# Patient Record
Sex: FEMALE | Race: BLACK OR AFRICAN AMERICAN | NOT HISPANIC OR LATINO | Employment: FULL TIME | URBAN - METROPOLITAN AREA
[De-identification: names, ages, dates, MRNs, and addresses within clinical notes are randomized per-mention and may not be internally consistent; named-entity substitution may affect disease eponyms.]

---

## 2018-05-17 PROBLEM — G44.229 CHRONIC TENSION-TYPE HEADACHE, NOT INTRACTABLE: Chronic | Status: ACTIVE | Noted: 2018-05-17

## 2018-05-17 PROBLEM — Z79.01 CHRONIC ANTICOAGULATION: Chronic | Status: ACTIVE | Noted: 2018-05-17

## 2018-05-17 PROBLEM — I82.532 CHRONIC DEEP VEIN THROMBOSIS (DVT) OF POPLITEAL VEIN OF LEFT LOWER EXTREMITY: Chronic | Status: ACTIVE | Noted: 2018-05-17

## 2018-05-17 PROBLEM — I10 ESSENTIAL HYPERTENSION: Chronic | Status: ACTIVE | Noted: 2018-05-17

## 2019-04-04 ENCOUNTER — HOSPITAL ENCOUNTER (OUTPATIENT)
Dept: SLEEP MEDICINE | Facility: HOSPITAL | Age: 49
Discharge: HOME | End: 2019-04-04
Attending: SPECIALIST
Payer: COMMERCIAL

## 2019-04-04 DIAGNOSIS — F51.01 PRIMARY INSOMNIA: ICD-10-CM

## 2019-04-04 PROCEDURE — 95810 POLYSOM 6/> YRS 4/> PARAM: CPT

## 2019-04-16 ENCOUNTER — HOSPITAL ENCOUNTER (OUTPATIENT)
Dept: SLEEP MEDICINE | Facility: HOSPITAL | Age: 49
Discharge: HOME | End: 2019-04-16
Attending: SPECIALIST
Payer: COMMERCIAL

## 2019-04-16 DIAGNOSIS — G47.33 OBSTRUCTIVE SLEEP APNEA (ADULT) (PEDIATRIC): ICD-10-CM

## 2019-04-16 PROCEDURE — 95811 POLYSOM 6/>YRS CPAP 4/> PARM: CPT

## 2021-07-16 PROBLEM — E66.3 OVERWEIGHT WITH BODY MASS INDEX (BMI) OF 29 TO 29.9 IN ADULT: Chronic | Status: ACTIVE | Noted: 2021-07-16

## 2021-11-09 PROBLEM — Z51.81 THERAPEUTIC DRUG MONITORING: Chronic | Status: ACTIVE | Noted: 2021-11-09

## 2021-11-09 PROBLEM — E66.811 CLASS 1 OBESITY WITH ALVEOLAR HYPOVENTILATION, SERIOUS COMORBIDITY, AND BODY MASS INDEX (BMI) OF 30.0 TO 30.9 IN ADULT: Chronic | Status: ACTIVE | Noted: 2021-07-16

## 2021-11-09 PROBLEM — Z71.3 DIETARY COUNSELING: Chronic | Status: ACTIVE | Noted: 2021-11-09

## 2021-11-09 PROBLEM — E66.2 CLASS 1 OBESITY WITH ALVEOLAR HYPOVENTILATION, SERIOUS COMORBIDITY, AND BODY MASS INDEX (BMI) OF 30.0 TO 30.9 IN ADULT: Chronic | Status: ACTIVE | Noted: 2021-07-16

## 2021-11-09 PROBLEM — Z51.81 THERAPEUTIC DRUG MONITORING: Status: ACTIVE | Noted: 2021-11-09

## 2021-11-09 PROBLEM — Z71.3 DIETARY COUNSELING: Status: ACTIVE | Noted: 2021-11-09

## 2022-05-04 PROBLEM — R55 SYNCOPE: Status: ACTIVE | Noted: 2022-05-04

## 2022-05-04 PROBLEM — I16.0 HYPERTENSIVE URGENCY: Status: ACTIVE | Noted: 2022-05-04

## 2022-05-04 PROBLEM — Z86.73 HX OF CEREBRAL INFARCTION: Status: ACTIVE | Noted: 2022-05-04

## 2022-05-06 PROBLEM — Z32.01 POSITIVE PREGNANCY TEST: Status: ACTIVE | Noted: 2022-05-06

## 2022-05-06 PROBLEM — G93.5 CHIARI MALFORMATION TYPE I: Status: ACTIVE | Noted: 2022-05-06

## 2022-06-02 PROBLEM — Z71.89 ENCOUNTER TO DISCUSS TREATMENT OPTIONS: Status: ACTIVE | Noted: 2022-06-02

## 2022-06-02 PROBLEM — R59.1 LYMPHADENOPATHY: Status: ACTIVE | Noted: 2022-06-02

## 2022-06-02 PROBLEM — I63.9 ISCHEMIC STROKE: Status: ACTIVE | Noted: 2022-06-02

## 2022-06-02 PROBLEM — Z71.2 ENCOUNTER TO DISCUSS TEST RESULTS: Status: ACTIVE | Noted: 2022-06-02

## 2022-06-02 PROBLEM — D68.59 HYPERCOAGULABLE STATE: Status: ACTIVE | Noted: 2022-06-02

## 2022-06-17 PROBLEM — Z51.81 THERAPEUTIC DRUG MONITORING: Chronic | Status: RESOLVED | Noted: 2021-11-09 | Resolved: 2022-06-17

## 2022-06-17 PROBLEM — R55 SYNCOPE: Status: RESOLVED | Noted: 2022-05-04 | Resolved: 2022-06-17

## 2022-06-17 PROBLEM — Z71.89 ENCOUNTER TO DISCUSS TREATMENT OPTIONS: Status: RESOLVED | Noted: 2022-06-02 | Resolved: 2022-06-17

## 2022-06-17 PROBLEM — Z71.2 ENCOUNTER TO DISCUSS TEST RESULTS: Status: RESOLVED | Noted: 2022-06-02 | Resolved: 2022-06-17

## 2022-06-17 PROBLEM — Z71.3 DIETARY COUNSELING: Chronic | Status: RESOLVED | Noted: 2021-11-09 | Resolved: 2022-06-17

## 2022-06-17 PROBLEM — I16.0 HYPERTENSIVE URGENCY: Status: RESOLVED | Noted: 2022-05-04 | Resolved: 2022-06-17

## 2022-06-17 PROBLEM — R77.1 ELEVATED SERUM GLOBULIN LEVEL: Status: ACTIVE | Noted: 2022-06-17

## 2022-06-17 PROBLEM — Z32.01 POSITIVE PREGNANCY TEST: Status: RESOLVED | Noted: 2022-05-06 | Resolved: 2022-06-17

## 2023-03-05 PROBLEM — D50.8 IRON DEFICIENCY ANEMIA SECONDARY TO INADEQUATE DIETARY IRON INTAKE: Status: ACTIVE | Noted: 2023-03-05

## 2023-07-11 PROBLEM — Z71.9 ENCOUNTER FOR EDUCATION: Status: ACTIVE | Noted: 2023-07-11

## 2023-07-11 PROBLEM — E04.2 MULTIPLE THYROID NODULES: Status: ACTIVE | Noted: 2023-07-11

## 2023-10-18 PROBLEM — G40.A09 ABSENCE SEIZURE: Status: ACTIVE | Noted: 2023-10-18

## 2023-10-18 PROBLEM — G40.A09 ABSENCE SEIZURE: Chronic | Status: ACTIVE | Noted: 2023-10-18

## 2023-10-18 PROBLEM — D68.59 HYPERCOAGULABLE STATE: Chronic | Status: ACTIVE | Noted: 2022-06-02

## 2023-10-18 PROBLEM — R73.01 IMPAIRED FASTING GLUCOSE: Status: ACTIVE | Noted: 2023-10-18

## 2023-10-18 PROBLEM — G93.5 CHIARI MALFORMATION TYPE I: Chronic | Status: ACTIVE | Noted: 2022-05-06

## 2023-10-18 PROBLEM — I63.9 ISCHEMIC STROKE: Chronic | Status: ACTIVE | Noted: 2022-06-02

## 2023-10-18 PROBLEM — H54.7 VISION LOSS: Chronic | Status: ACTIVE | Noted: 2023-10-18

## 2024-01-22 PROBLEM — I63.9 ISCHEMIC STROKE: Chronic | Status: RESOLVED | Noted: 2022-06-02 | Resolved: 2024-01-22

## 2024-02-19 ENCOUNTER — TELEPHONE (OUTPATIENT)
Dept: NEUROLOGY | Facility: CLINIC | Age: 54
End: 2024-02-19
Payer: COMMERCIAL

## 2024-02-19 NOTE — TELEPHONE ENCOUNTER
Neuro referral, MRI reports and neuro notes in Epic. Msg to Alexia to sched new pt with Dr Mora or Dr Sutherland.

## 2024-02-20 ENCOUNTER — TELEPHONE (OUTPATIENT)
Dept: NEUROLOGY | Facility: CLINIC | Age: 54
End: 2024-02-20
Payer: COMMERCIAL

## 2024-03-07 ENCOUNTER — TELEPHONE (OUTPATIENT)
Dept: NEUROLOGY | Facility: CLINIC | Age: 54
End: 2024-03-07
Payer: COMMERCIAL

## 2024-04-04 ENCOUNTER — LAB VISIT (OUTPATIENT)
Dept: LAB | Facility: HOSPITAL | Age: 54
End: 2024-04-04
Attending: STUDENT IN AN ORGANIZED HEALTH CARE EDUCATION/TRAINING PROGRAM
Payer: COMMERCIAL

## 2024-04-04 ENCOUNTER — DOCUMENTATION ONLY (OUTPATIENT)
Dept: NEUROLOGY | Facility: CLINIC | Age: 54
End: 2024-04-04
Payer: COMMERCIAL

## 2024-04-04 ENCOUNTER — OFFICE VISIT (OUTPATIENT)
Dept: NEUROLOGY | Facility: CLINIC | Age: 54
End: 2024-04-04
Payer: COMMERCIAL

## 2024-04-04 VITALS
BODY MASS INDEX: 29.48 KG/M2 | DIASTOLIC BLOOD PRESSURE: 86 MMHG | WEIGHT: 187.81 LBS | HEIGHT: 67 IN | SYSTOLIC BLOOD PRESSURE: 126 MMHG | HEART RATE: 80 BPM

## 2024-04-04 DIAGNOSIS — H54.7 VISION LOSS: Chronic | ICD-10-CM

## 2024-04-04 DIAGNOSIS — H54.7 VISION LOSS: Primary | Chronic | ICD-10-CM

## 2024-04-04 DIAGNOSIS — G35 MULTIPLE SCLEROSIS: ICD-10-CM

## 2024-04-04 DIAGNOSIS — I63.89 OTHER CEREBRAL INFARCTION: ICD-10-CM

## 2024-04-04 DIAGNOSIS — R42 DIZZINESS: ICD-10-CM

## 2024-04-04 LAB
C3 SERPL-MCNC: 149 MG/DL (ref 50–180)
C4 SERPL-MCNC: 46 MG/DL (ref 11–44)
ERYTHROCYTE [SEDIMENTATION RATE] IN BLOOD BY PHOTOMETRIC METHOD: 32 MM/HR (ref 0–36)

## 2024-04-04 PROCEDURE — 86160 COMPLEMENT ANTIGEN: CPT | Performed by: STUDENT IN AN ORGANIZED HEALTH CARE EDUCATION/TRAINING PROGRAM

## 2024-04-04 PROCEDURE — 82595 ASSAY OF CRYOGLOBULIN: CPT | Performed by: STUDENT IN AN ORGANIZED HEALTH CARE EDUCATION/TRAINING PROGRAM

## 2024-04-04 PROCEDURE — 99999 PR PBB SHADOW E&M-EST. PATIENT-LVL V: CPT | Mod: PBBFAC,,, | Performed by: STUDENT IN AN ORGANIZED HEALTH CARE EDUCATION/TRAINING PROGRAM

## 2024-04-04 PROCEDURE — 86363 MOG-IGG1 ANTB FLO CYTMTRY EA: CPT | Performed by: STUDENT IN AN ORGANIZED HEALTH CARE EDUCATION/TRAINING PROGRAM

## 2024-04-04 PROCEDURE — G2211 COMPLEX E/M VISIT ADD ON: HCPCS | Mod: S$GLB,,, | Performed by: STUDENT IN AN ORGANIZED HEALTH CARE EDUCATION/TRAINING PROGRAM

## 2024-04-04 PROCEDURE — 86036 ANCA SCREEN EACH ANTIBODY: CPT | Performed by: STUDENT IN AN ORGANIZED HEALTH CARE EDUCATION/TRAINING PROGRAM

## 2024-04-04 PROCEDURE — 86053 AQAPRN-4 ANTB FLO CYTMTRY EA: CPT | Performed by: STUDENT IN AN ORGANIZED HEALTH CARE EDUCATION/TRAINING PROGRAM

## 2024-04-04 PROCEDURE — 85652 RBC SED RATE AUTOMATED: CPT | Performed by: STUDENT IN AN ORGANIZED HEALTH CARE EDUCATION/TRAINING PROGRAM

## 2024-04-04 PROCEDURE — 86235 NUCLEAR ANTIGEN ANTIBODY: CPT | Mod: 59 | Performed by: STUDENT IN AN ORGANIZED HEALTH CARE EDUCATION/TRAINING PROGRAM

## 2024-04-04 PROCEDURE — 4010F ACE/ARB THERAPY RXD/TAKEN: CPT | Mod: CPTII,S$GLB,, | Performed by: STUDENT IN AN ORGANIZED HEALTH CARE EDUCATION/TRAINING PROGRAM

## 2024-04-04 PROCEDURE — 0361U NEURFLMNT LT CHN DIG IA QUAN: CPT | Performed by: STUDENT IN AN ORGANIZED HEALTH CARE EDUCATION/TRAINING PROGRAM

## 2024-04-04 PROCEDURE — 3079F DIAST BP 80-89 MM HG: CPT | Mod: CPTII,S$GLB,, | Performed by: STUDENT IN AN ORGANIZED HEALTH CARE EDUCATION/TRAINING PROGRAM

## 2024-04-04 PROCEDURE — 3074F SYST BP LT 130 MM HG: CPT | Mod: CPTII,S$GLB,, | Performed by: STUDENT IN AN ORGANIZED HEALTH CARE EDUCATION/TRAINING PROGRAM

## 2024-04-04 PROCEDURE — 3008F BODY MASS INDEX DOCD: CPT | Mod: CPTII,S$GLB,, | Performed by: STUDENT IN AN ORGANIZED HEALTH CARE EDUCATION/TRAINING PROGRAM

## 2024-04-04 PROCEDURE — 36415 COLL VENOUS BLD VENIPUNCTURE: CPT | Performed by: STUDENT IN AN ORGANIZED HEALTH CARE EDUCATION/TRAINING PROGRAM

## 2024-04-04 PROCEDURE — 86235 NUCLEAR ANTIGEN ANTIBODY: CPT | Performed by: STUDENT IN AN ORGANIZED HEALTH CARE EDUCATION/TRAINING PROGRAM

## 2024-04-04 PROCEDURE — 1159F MED LIST DOCD IN RCRD: CPT | Mod: CPTII,S$GLB,, | Performed by: STUDENT IN AN ORGANIZED HEALTH CARE EDUCATION/TRAINING PROGRAM

## 2024-04-04 PROCEDURE — 86160 COMPLEMENT ANTIGEN: CPT | Mod: 59 | Performed by: STUDENT IN AN ORGANIZED HEALTH CARE EDUCATION/TRAINING PROGRAM

## 2024-04-04 PROCEDURE — 99215 OFFICE O/P EST HI 40 MIN: CPT | Mod: S$GLB,,, | Performed by: STUDENT IN AN ORGANIZED HEALTH CARE EDUCATION/TRAINING PROGRAM

## 2024-04-04 NOTE — PROGRESS NOTES
[facility]  New Patient Visit      History:     This is a patient referred here for MS evaluation.  She was last seen by Dr. Hendricks in 02/15/2024.    In May 2023 she developed sudden onset of dizziness and presyncope and was diagnosed with acute ischemic stroke in the ER.  At that time she was already on Coumadin due to history of DVT and PE requiring thrombectomy.  MRI at that time also revealed a Chiari 1 malformation and a cervical syrinx.  Her coumadin dose was increased and she was discharged home after a relatively benign cardiac workup.  SHe was evaluated by hematology and janneth diagnosed with idiopathic hypercoagulable state, plan to stay on ASA 81, coumadin for life.     In Aug 2023 She had another episode of dizziness and syncope.     She was started on Keppra after 5/2023 for staring spells thought to be due to seizures.. Last EEG normal and she was taken off of Keppra by Dr. Hendricks in Feb 2024     In October 2023 series started developing blurry vision bilaterally that has been worsened over time. She was evaluated by ophthalmology and no issues were found.     She did complete trial of physical therapy for dizziness and it helped her dizziness some.   MRI stable but there was a question about demyelinating disease on most recent MRI.     Currently she feels imbalanced and worsening blurriness on right.     She has been on medical leave due to imbalance since last month due to her symptoms. Used to work as teacher.       ROS:     A complete 9 system ROS was reviewed by me and otherwise negative .     Past Medical History:   Diagnosis Date    Anemia     Anticoagulated on Coumadin     DVT (deep venous thrombosis)     Headache     Hypertension        Past Surgical History:   Procedure Laterality Date    PULMONARY EMBOLISM SURGERY      TUBAL LIGATION         Family History   Problem Relation Age of Onset    Arthritis Mother     Cancer Mother     Depression Mother     Early death Mother      Heart disease Mother     Hypertension Mother     Mental illness Mother     Miscarriages / Stillbirths Mother        Social History     Socioeconomic History    Marital status:    Tobacco Use    Smoking status: Never     Passive exposure: Never    Smokeless tobacco: Never   Substance and Sexual Activity    Alcohol use: No    Drug use: No    Sexual activity: Yes     Partners: Male     Social Determinants of Health     Financial Resource Strain: Medium Risk (4/4/2024)    Overall Financial Resource Strain (CARDIA)     Difficulty of Paying Living Expenses: Somewhat hard   Food Insecurity: Food Insecurity Present (4/4/2024)    Hunger Vital Sign     Worried About Running Out of Food in the Last Year: Sometimes true     Ran Out of Food in the Last Year: Never true   Transportation Needs: No Transportation Needs (4/4/2024)    PRAPARE - Transportation     Lack of Transportation (Medical): No     Lack of Transportation (Non-Medical): No   Physical Activity: Unknown (4/4/2024)    Exercise Vital Sign     Minutes of Exercise per Session: 20 min   Stress: Stress Concern Present (4/4/2024)    [employer] [employer] - Occupational Stress Questionnaire     Feeling of Stress : To some extent   Social Connections: Unknown (4/4/2024)    Social Connection and Isolation Panel [NHANES]     Frequency of Communication with Friends and Family: More than three times a week     Frequency of Social Gatherings with Friends and Family: Once a week     Active Member of Clubs or Organizations: No     Attends Club or Organization Meetings: Never     Marital Status:    Housing Stability: Low Risk  (4/4/2024)    Housing Stability Vital Sign     Unable to Pay for Housing in the Last Year: No     Number of Places Lived in the Last Year: 1     Unstable Housing in the Last Year: No       Review of patient's allergies indicates:   Allergen Reactions    Pcn [penicillins] Other (See Comments)     unknown       Living  "arrangements - the patient lives with their family.    Patient Employment       Status   Disabled    Employer   [employer] (OTHER)    Address   ,                Exam:     Vitals:    04/04/24 1218   BP: 126/86   Pulse: 80   Weight: 85.2 kg (187 lb 13.3 oz)   Height: 5' 7" (1.702 m)          In general, the patient is well nourished and appears to be in no acute distress.    MENTAL STATUS: language is fluent, normal verbal comprehension, short-term and remote memory is intact, attention is normal, patient is alert and oriented, fund of knowlege is appropriate by vocabulary. Behavior and judgment appropriate with full medical decision making capacity.     CRANIAL NERVE EXAM: Fasting beating nystagmus on R gaze bilaterally.  Extraocular muscles are intact. Pupils are equal, round, and reactive to light. VFs intact. No facial asymmetry. Facial sensation is intact bilaterally. There is no dysarthria. Uvula is midline, and palate moves symmetrically. Shoulder shrug intact bilaterlly. Tongue protrusion is midline. Hearing is intact to finger rub bilaterally. Neck is supple with full ROM  No Lhermitte's    MOTOR EXAM: Normal bulk and tone throughout UE and LE bilaterally.   No pronator drift; rapid sequential movements are normal; Strength is  5/5 in all groups in the lower extremities and upper extremities    REFLEXES: 2+ and symmetric throughout in all four extremeties; toes are down bilaterally; negative Barksdale's, no cross-adductors    SENSORY EXAM: Normal to light touch, vibration, pinprick throughout.    COORDINATION: Normal finger-to-nose exam. Normal heel-to-shin exam.    GAIT: Slow and unsteady. Unable to perform stressed gait and tandem gait due to dizziness.    Positive Romberg.    Imaging (personally reviewed):         Results for orders placed during the hospital encounter of 02/12/24    MRI Brain Demyelinating W W/O Contrast    Impression  1. No acute findings or significant change since 07/27/2023  2. Several " "nonenhancing white matter lesions, appearance suggesting possible combination of demyelination and old lacunar infarcts  3. 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  Cervical cord syrinx is not as well delineated as on prior exams      Electronically signed by: Farida Almaguer MD  Date:    02/12/2024  Time:    17:11    No results found for this or any previous visit.    No results found for this or any previous visit.      Labs:     No results found for: "TXHWLZYP73CQ"  No results found for: "JCVINDEX", "JCVANTIBODY"  No results found for: "BM6WADNC", "ABSOLUTECD3", "AL4JGPBT", "ABSOLUTECD8", "XQ3DZMGA", "ABSOLUTECD4", "LABCD48"  Lab Results   Component Value Date    WBC 4.70 09/19/2023    RBC 4.91 09/19/2023    HGB 13.4 09/19/2023    HCT 40.8 09/19/2023    MCV 83 09/19/2023    MCH 27.4 09/19/2023    MCHC 32.9 09/19/2023    RDW 16.4 (H) 09/19/2023     09/19/2023    MPV 7.3 (L) 09/19/2023    GRAN 2.5 09/19/2023    GRAN 54.7 09/19/2023    LYMPH 1.7 09/19/2023    LYMPH 35.7 09/19/2023    MONO 0.4 09/19/2023    MONO 7.8 09/19/2023    EOS 0.0 09/19/2023    BASO 0.00 09/19/2023    EOSINOPHIL 1.0 09/19/2023    BASOPHIL 0.8 09/19/2023     Sodium   Date Value Ref Range Status   09/19/2023 143 136 - 145 mmol/L Final     Potassium   Date Value Ref Range Status   09/19/2023 3.7 3.5 - 5.1 mmol/L Final     Chloride   Date Value Ref Range Status   09/19/2023 104 95 - 110 mmol/L Final     CO2   Date Value Ref Range Status   09/19/2023 31 (H) 23 - 29 mmol/L Final     Glucose   Date Value Ref Range Status   09/19/2023 114 (H) 74 - 106 mg/dL Final     BUN   Date Value Ref Range Status   09/19/2023 14 7 - 17 mg/dL Final     Creatinine   Date Value Ref Range Status   09/19/2023 0.78 0.70 - 1.20 mg/dL Final     Calcium   Date Value Ref Range Status   09/19/2023 9.2 8.4 - 10.2 mg/dL Final     Total Protein   Date Value Ref Range Status   09/19/2023 8.4 (H) 6.3 - 8.2 g/dL Final     Albumin   Date Value Ref Range Status "   09/19/2023 4.1 3.5 - 5.2 g/dL Final     Total Bilirubin   Date Value Ref Range Status   09/19/2023 0.5 0.2 - 1.3 mg/dL Final     Alkaline Phosphatase   Date Value Ref Range Status   09/19/2023 83 38 - 145 U/L Final     AST   Date Value Ref Range Status   09/19/2023 29 14 - 36 U/L Final     ALT   Date Value Ref Range Status   09/19/2023 19 10 - 44 U/L Final     Anion Gap   Date Value Ref Range Status   09/19/2023 8 8 - 16 mmol/L Final     eGFR if    Date Value Ref Range Status   07/11/2022 >60 >60 mL/min/1.73 m^2 Final     eGFR if non    Date Value Ref Range Status   07/11/2022 >60 >60 mL/min/1.73 m^2 Final     Comment:     Calculation used to obtain the estimated glomerular filtration  rate (eGFR) is the CKD-EPI equation.                   Diagnosis/Assessment/Plan:     Assessment:  MRI reviewed.  She does have several corpus callosum lesions that look suspicious for demyelination.  Her clinical presentation is rather atypical without a clear relapsing history. Exam concerning for BPPV.   The MRI brain lT2 lesions also had diffusion restriction that may be T2 shine through instead of ischemia.  Pending review of her May 2023 scan to verify whether she's had ischemic stroke vs demyelinating lesions. Updated scan with central vein protocol to clarify. Also obtain MRI spine for screening, and MRA for vasculitis workup.   Lab workup for mimics  Referral to vestibular therapy  Neuro-opthalmology evaluation for vision blurriness  Plan discussed and questions were answered to satisfaction.  Return to clinic after imaging and lab results.                     Total time spent with the patient: 60 minutes, including face to face consultation, chart review and coordination of care, on the day of the visit. This includes face to face time and non-face to face time preparing to see the patient (eg, review of tests), obtaining and/or reviewing separately obtained history, documenting clinical  information in the electronic or other health record, independently interpreting resultsand communicating results to the patient/family/caregiver, or care coordination.   I performed a neurobehavioral status examination that included a clinical assessment of thinking, reasoning, and judgment. Please see above HPI and ROS for full details.       Lisa Sutherland MD, MSc  Attending neurologist

## 2024-04-08 LAB
ANCA AB TITR SER IF: NORMAL TITER
ANTI SM ANTIBODY: 0.09 RATIO (ref 0–0.99)
ANTI-SM INTERPRETATION: NEGATIVE
ANTI-SSB ANTIBODY: 0.06 RATIO (ref 0–0.99)
ANTI-SSB INTERPRETATION: NEGATIVE
NEUROFILAMENT LIGHT CHAIN, PLASMA: 16.5 PG/ML
P-ANCA TITR SER IF: NORMAL TITER

## 2024-04-10 LAB
CNS DEMYELINATING DISEASE EVAL: NORMAL
MOG-IGG1: NEGATIVE
NMO/AQP4 FACS,S: NEGATIVE

## 2024-04-15 LAB — CRYOGLOB SER QL: NORMAL

## 2024-04-16 ENCOUNTER — TELEPHONE (OUTPATIENT)
Dept: OPHTHALMOLOGY | Facility: CLINIC | Age: 54
End: 2024-04-16
Payer: COMMERCIAL

## 2024-04-16 NOTE — TELEPHONE ENCOUNTER
----- Message from Shayy Cloud MA sent at 4/5/2024 10:26 AM CDT -----  Pt referred to dr hernandez

## 2024-04-17 PROBLEM — R42 DIZZINESS: Status: ACTIVE | Noted: 2024-04-17

## 2024-04-17 PROBLEM — I63.89 OTHER CEREBRAL INFARCTION: Status: ACTIVE | Noted: 2024-04-17

## 2024-05-03 ENCOUNTER — HOSPITAL ENCOUNTER (OUTPATIENT)
Dept: RADIOLOGY | Facility: HOSPITAL | Age: 54
Discharge: HOME | End: 2024-05-03
Attending: STUDENT IN AN ORGANIZED HEALTH CARE EDUCATION/TRAINING PROGRAM
Payer: COMMERCIAL

## 2024-05-03 DIAGNOSIS — I63.89 OTHER CEREBRAL INFARCTION: ICD-10-CM

## 2024-05-03 DIAGNOSIS — H54.7 VISION LOSS: Chronic | ICD-10-CM

## 2024-05-03 DIAGNOSIS — G96.00 CSF LEAK: Primary | ICD-10-CM

## 2024-05-03 PROCEDURE — 76377 3D RENDER W/INTRP POSTPROCES: CPT | Mod: TC

## 2024-05-03 PROCEDURE — 25500020 PHARM REV CODE 255: Performed by: STUDENT IN AN ORGANIZED HEALTH CARE EDUCATION/TRAINING PROGRAM

## 2024-05-03 PROCEDURE — 76377 3D RENDER W/INTRP POSTPROCES: CPT | Mod: 26,,, | Performed by: RADIOLOGY

## 2024-05-03 PROCEDURE — 70553 MRI BRAIN STEM W/O & W/DYE: CPT | Mod: 26,,, | Performed by: RADIOLOGY

## 2024-05-03 PROCEDURE — A9585 GADOBUTROL INJECTION: HCPCS | Performed by: STUDENT IN AN ORGANIZED HEALTH CARE EDUCATION/TRAINING PROGRAM

## 2024-05-03 RX ORDER — GADOBUTROL 604.72 MG/ML
9 INJECTION INTRAVENOUS
Status: COMPLETED | OUTPATIENT
Start: 2024-05-03 | End: 2024-05-03

## 2024-05-03 RX ADMIN — GADOBUTROL 9 ML: 604.72 INJECTION INTRAVENOUS at 09:05

## 2024-05-08 ENCOUNTER — PATIENT MESSAGE (OUTPATIENT)
Dept: NEUROLOGY | Facility: CLINIC | Age: 54
End: 2024-05-08
Payer: COMMERCIAL

## 2024-05-09 ENCOUNTER — OFFICE VISIT (OUTPATIENT)
Dept: NEUROLOGY | Facility: CLINIC | Age: 54
End: 2024-05-09
Payer: COMMERCIAL

## 2024-05-09 VITALS
HEART RATE: 86 BPM | BODY MASS INDEX: 29.5 KG/M2 | WEIGHT: 187.94 LBS | SYSTOLIC BLOOD PRESSURE: 135 MMHG | HEIGHT: 67 IN | DIASTOLIC BLOOD PRESSURE: 87 MMHG

## 2024-05-09 DIAGNOSIS — R93.0 RADIOLOGICALLY ISOLATED SYNDROME: ICD-10-CM

## 2024-05-09 DIAGNOSIS — G93.5 CHIARI MALFORMATION TYPE I: Primary | Chronic | ICD-10-CM

## 2024-05-09 PROCEDURE — 1159F MED LIST DOCD IN RCRD: CPT | Mod: CPTII,S$GLB,, | Performed by: STUDENT IN AN ORGANIZED HEALTH CARE EDUCATION/TRAINING PROGRAM

## 2024-05-09 PROCEDURE — 3008F BODY MASS INDEX DOCD: CPT | Mod: CPTII,S$GLB,, | Performed by: STUDENT IN AN ORGANIZED HEALTH CARE EDUCATION/TRAINING PROGRAM

## 2024-05-09 PROCEDURE — 99999 PR PBB SHADOW E&M-EST. PATIENT-LVL IV: CPT | Mod: PBBFAC,,, | Performed by: STUDENT IN AN ORGANIZED HEALTH CARE EDUCATION/TRAINING PROGRAM

## 2024-05-09 PROCEDURE — 3079F DIAST BP 80-89 MM HG: CPT | Mod: CPTII,S$GLB,, | Performed by: STUDENT IN AN ORGANIZED HEALTH CARE EDUCATION/TRAINING PROGRAM

## 2024-05-09 PROCEDURE — 3075F SYST BP GE 130 - 139MM HG: CPT | Mod: CPTII,S$GLB,, | Performed by: STUDENT IN AN ORGANIZED HEALTH CARE EDUCATION/TRAINING PROGRAM

## 2024-05-09 PROCEDURE — 4010F ACE/ARB THERAPY RXD/TAKEN: CPT | Mod: CPTII,S$GLB,, | Performed by: STUDENT IN AN ORGANIZED HEALTH CARE EDUCATION/TRAINING PROGRAM

## 2024-05-09 PROCEDURE — 99215 OFFICE O/P EST HI 40 MIN: CPT | Mod: S$GLB,,, | Performed by: STUDENT IN AN ORGANIZED HEALTH CARE EDUCATION/TRAINING PROGRAM

## 2024-05-09 NOTE — PROGRESS NOTES
[facility]  Follow Up Patient Visit      Disease Summary     Principle neurological diagnosis: RIS    Date of symptom onset: NA  Date of diagnosis: 5/9/24  Disease type at diagnosis: RIS  Disease type currently: RIS  Previous therapy: NA  Current therapy: NA  Last MRI Brain: 5/3/24  Last MRI C-spine: 4/22/24 no lesions, but syrinx present  Last MRI T-spine:  4/22/24 no lesions  CSF: NA    Interval history:     Patient here to discuss results.     She is still experiencing dizziness and balance issues. Working with PT currently.    She also has headaches upon standing, improves when lying down. Per patient and , this has been ongoing for years.       ROS:     SOCIAL HISTORY  Living arrangements - the patient lives with their family.  Social History     Socioeconomic History    Marital status:    Tobacco Use    Smoking status: Never     Passive exposure: Never    Smokeless tobacco: Never   Substance and Sexual Activity    Alcohol use: No    Drug use: No    Sexual activity: Yes     Partners: Male     Social Determinants of Health     Financial Resource Strain: Medium Risk (4/4/2024)    Overall Financial Resource Strain (CARDIA)     Difficulty of Paying Living Expenses: Somewhat hard   Food Insecurity: Food Insecurity Present (4/4/2024)    Hunger Vital Sign     Worried About Running Out of Food in the Last Year: Sometimes true     Ran Out of Food in the Last Year: Never true   Transportation Needs: No Transportation Needs (4/4/2024)    PRAPARE - Transportation     Lack of Transportation (Medical): No     Lack of Transportation (Non-Medical): No   Physical Activity: Unknown (4/4/2024)    Exercise Vital Sign     Minutes of Exercise per Session: 20 min   Stress: Stress Concern Present (4/4/2024)    [employer] [place] [employer] - Occupational Stress Questionnaire     Feeling of Stress : To some extent   Housing Stability: Low Risk  (4/4/2024)    Housing  "Stability Vital Sign     Unable to Pay for Housing in the Last Year: No     Number of Places Lived in the Last Year: 1     Unstable Housing in the Last Year: No       Patient Employment       Status   Disabled    Employer   [employer] (OTHER)    Address   ,                  Exam:     Vitals:    05/09/24 1250   BP: 135/87   Pulse: 86   Weight: 85.2 kg (187 lb 15.1 oz)   Height: 5' 7" (1.702 m)          In general, the patient is well nourished and appears to be in no acute distress.    MENTAL STATUS: language is fluent, normal verbal comprehension, short-term and remote memory is intact, attention is normal, patient is alert  CRANIAL NERVE EXAM:  Extraocular muscles are intact.  No facial asymmetry. FThere is no dysarthria.       Imaging (personally reviewed):             Results for orders placed during the hospital encounter of 02/12/24    MRI Brain Demyelinating W W/O Contrast    Impression  1. No acute findings or significant change since 07/27/2023  2. Several nonenhancing white matter lesions, appearance suggesting possible combination of demyelination and old lacunar infarcts  3. 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  Cervical cord syrinx is not as well delineated as on prior exams      Electronically signed by: Farida Almaguer MD  Date:    02/12/2024  Time:    17:11    Results for orders placed during the hospital encounter of 04/22/24    MRI Cervical Spine Demyelinating W W/O Contrast    Impression  1. There is a cystic area 6 x 2 mm posterior to C3 and a area of increased T2 signal in the cervical cord from C 3 through C5 with no enhancement suggesting probable syrinx (syringomyelia)  2. C5-6 focal right paracentral disc extrusion or bony proliferation compressing the cord on the right with mild spinal stenosis moderate right lateral recess and mild neural foramina narrowing  3. At C6-7 focal central protrusion extrusion centrally effacing the cord contributing to mild spinal stenosis  4. Chiari 1 " "malformation      Electronically signed by: Kaylan Wilhelm MD  Date:    04/22/2024  Time:    11:40    Results for orders placed during the hospital encounter of 04/22/24    MRI Thoracic Spine Demyelinating W W/O Contrast    Impression  1. On the  film there is abnormal linear signal throughout the cervical spine refer to MRI of the cervical spine  2. Disc desiccation throughout and small right paracentral protrusion or bulge at T7-8 and no compression of the cord or spinal stenosis      Electronically signed by: Kaylan Wilhelm MD  Date:    04/22/2024  Time:    11:22      Labs:     No results found for: "PDXUXQTG37HO"  No results found for: "JCVINDEX", "JCVANTIBODY"  No results found for: "YZ8GSCRQ", "ABSOLUTECD3", "AV6XLCYX", "ABSOLUTECD8", "AN4ZVEQU", "ABSOLUTECD4", "LABCD48"  Lab Results   Component Value Date    WBC 4.70 09/19/2023    RBC 4.91 09/19/2023    HGB 13.4 09/19/2023    HCT 40.8 09/19/2023    MCV 83 09/19/2023    MCH 27.4 09/19/2023    MCHC 32.9 09/19/2023    RDW 16.4 (H) 09/19/2023     09/19/2023    MPV 7.3 (L) 09/19/2023    GRAN 2.5 09/19/2023    GRAN 54.7 09/19/2023    LYMPH 1.7 09/19/2023    LYMPH 35.7 09/19/2023    MONO 0.4 09/19/2023    MONO 7.8 09/19/2023    EOS 0.0 09/19/2023    BASO 0.00 09/19/2023    EOSINOPHIL 1.0 09/19/2023    BASOPHIL 0.8 09/19/2023     Sodium   Date Value Ref Range Status   09/19/2023 143 136 - 145 mmol/L Final     Potassium   Date Value Ref Range Status   09/19/2023 3.7 3.5 - 5.1 mmol/L Final     Chloride   Date Value Ref Range Status   09/19/2023 104 95 - 110 mmol/L Final     CO2   Date Value Ref Range Status   09/19/2023 31 (H) 23 - 29 mmol/L Final     Glucose   Date Value Ref Range Status   09/19/2023 114 (H) 74 - 106 mg/dL Final     BUN   Date Value Ref Range Status   09/19/2023 14 7 - 17 mg/dL Final     Creatinine   Date Value Ref Range Status   09/19/2023 0.78 0.70 - 1.20 mg/dL Final     Calcium   Date Value Ref Range Status   09/19/2023 9.2 8.4 - 10.2 mg/dL " Final     Total Protein   Date Value Ref Range Status   09/19/2023 8.4 (H) 6.3 - 8.2 g/dL Final     Albumin   Date Value Ref Range Status   09/19/2023 4.1 3.5 - 5.2 g/dL Final     Total Bilirubin   Date Value Ref Range Status   09/19/2023 0.5 0.2 - 1.3 mg/dL Final     Alkaline Phosphatase   Date Value Ref Range Status   09/19/2023 83 38 - 145 U/L Final     AST   Date Value Ref Range Status   09/19/2023 29 14 - 36 U/L Final     ALT   Date Value Ref Range Status   09/19/2023 19 10 - 44 U/L Final     Anion Gap   Date Value Ref Range Status   09/19/2023 8 8 - 16 mmol/L Final     eGFR if    Date Value Ref Range Status   07/11/2022 >60 >60 mL/min/1.73 m^2 Final     eGFR if non    Date Value Ref Range Status   07/11/2022 >60 >60 mL/min/1.73 m^2 Final     Comment:     Calculation used to obtain the estimated glomerular filtration  rate (eGFR) is the CKD-EPI equation.          Diagnosis/Assessment/Plan:     Radiologically isolated syndrome  -Assessment:No classic MS relapse symptoms. Her current symptoms of dizziness and imbalance seem to be associated with her Chiari malformation and possible intracranial hypotension.   Discussed with neuroradiology Dr. Patricia, engorgement of the dural venous sinuses, pituitary gland, osseous thickening of calvarial margin suggest chronic intracrnial hypotension. Central vein sign is positive in many of the lesions consistent with demyelinatoin, suggesting possible RIS given absence of classic MS symptoms clinically. Depending on lesion evolution, she may benefit from low efficacy DMT that has better safety profile.  Would try to obtain CSF next depending on CSF leak workup via interventional radiology. Will also refer to neurosurgery    Symptom management   -Discussed increased hydration  Plan discussed and questions were answered to satisfaction.  Return to clinic in 3-4 weeks.        NEURO MULTIPLE SCLEROSIS IMPRESSION:   Number of relapses in the past  year?:  0  Clinical Progression:  Clinically Stable  MRI Progression:  Stable  MS Classification:  Radiologically isolated syndrome  DMT:  No change in management  Symptom Management:  No change in symptom management        Total time spent with the patient: 40 minutes, including face to face consultation, chart review and coordination of care, on the day of the visit. This includes face to face time and non-face to face time preparing to see the patient (eg, review of tests), obtaining and/or reviewing separately obtained history, documenting clinical information in the electronic or other health record, independently interpreting results and communicating results to the patient/family/caregiver, or care coordination.   I performed a neurobehavioral status examination that included a clinical assessment of thinking, reasoning, and judgment. Please see above HPI and ROS for full details.       Lisa Sutherland MD, MSc  Attending neurologist

## 2024-05-15 ENCOUNTER — OFFICE VISIT (OUTPATIENT)
Dept: NEUROSURGERY | Facility: CLINIC | Age: 54
End: 2024-05-15
Payer: COMMERCIAL

## 2024-05-15 VITALS — SYSTOLIC BLOOD PRESSURE: 116 MMHG | DIASTOLIC BLOOD PRESSURE: 79 MMHG | HEART RATE: 90 BPM

## 2024-05-15 DIAGNOSIS — G95.0 SYRINX OF SPINAL CORD: Primary | ICD-10-CM

## 2024-05-15 DIAGNOSIS — G93.5 CHIARI MALFORMATION TYPE I: Chronic | ICD-10-CM

## 2024-05-15 PROCEDURE — 3078F DIAST BP <80 MM HG: CPT | Mod: CPTII,S$GLB,, | Performed by: NEUROLOGICAL SURGERY

## 2024-05-15 PROCEDURE — 1159F MED LIST DOCD IN RCRD: CPT | Mod: CPTII,S$GLB,, | Performed by: NEUROLOGICAL SURGERY

## 2024-05-15 PROCEDURE — 99999 PR PBB SHADOW E&M-EST. PATIENT-LVL IV: CPT | Mod: PBBFAC,,, | Performed by: NEUROLOGICAL SURGERY

## 2024-05-15 PROCEDURE — 4010F ACE/ARB THERAPY RXD/TAKEN: CPT | Mod: CPTII,S$GLB,, | Performed by: NEUROLOGICAL SURGERY

## 2024-05-15 PROCEDURE — 3074F SYST BP LT 130 MM HG: CPT | Mod: CPTII,S$GLB,, | Performed by: NEUROLOGICAL SURGERY

## 2024-05-15 PROCEDURE — 99205 OFFICE O/P NEW HI 60 MIN: CPT | Mod: S$GLB,,, | Performed by: NEUROLOGICAL SURGERY

## 2024-05-15 PROCEDURE — 1160F RVW MEDS BY RX/DR IN RCRD: CPT | Mod: CPTII,S$GLB,, | Performed by: NEUROLOGICAL SURGERY

## 2024-05-15 NOTE — PROGRESS NOTES
CHIEF COMPLAINT:  Chiari malformation    HPI:  Elva Burroughs is a 53 y.o.  female with below PMH including RIS and prior strokes/TIA for which she is on warfarin and ASA, who is referred to me by Dr Sutherland for evaluation of chiari malformation.  She reports headaches that are frontal and occipital in location.  Her headaches used to be daily but now occur from every other week to 1-2 times per week.  She finds that Tylenol and aspirin have been helpful.  She reports that coughing and bending over intensify her headaches.  She does not think that her headaches are positional however she finds that resting or lying down will help her headaches.    She reports imbalance and gait disturbance.    She also reports dizziness and blurry vision.  She has been evaluated by Ophthalmology who reports that she has 2020 vision and does not have any papilledema.  The source of her blurry vision remains unclear.      Whenever her recent MRIs demonstrated features of possible intracranial hypotension however she has no confirmed evidence of a CSF leak or any spinal trauma from which a leak could have resulted.  She did faint when she had her stroke but never has injured her spine.    Review of patient's allergies indicates:   Allergen Reactions    Pcn [penicillins] Other (See Comments)     unknown       Past Medical History:   Diagnosis Date    Anemia     Anticoagulated on Coumadin     DVT (deep venous thrombosis)     Headache     Hypertension      Past Surgical History:   Procedure Laterality Date    PULMONARY EMBOLISM SURGERY      TUBAL LIGATION       Family History   Problem Relation Name Age of Onset    Arthritis Mother      Cancer Mother      Depression Mother      Early death Mother      Heart disease Mother      Hypertension Mother      Mental illness Mother      Miscarriages / Stillbirths Mother       Social History     Tobacco Use    Smoking status: Never     Passive exposure: Never    Smokeless tobacco: Never    Substance Use Topics    Alcohol use: No    Drug use: No        Review of Systems   Constitutional: Negative.    HENT:  Negative for congestion, ear discharge, ear pain, hearing loss, nosebleeds, sinus pain and tinnitus.    Eyes:  Positive for blurred vision.   Respiratory: Negative.     Cardiovascular:  Negative for chest pain, palpitations, claudication and leg swelling.   Gastrointestinal:  Negative for abdominal pain, blood in stool, constipation, diarrhea, melena and vomiting.   Genitourinary:  Negative for flank pain, frequency and urgency.   Musculoskeletal:  Negative for falls.   Skin: Negative.    Neurological:  Positive for dizziness and headaches. Negative for tingling, tremors, sensory change, speech change, focal weakness, seizures, loss of consciousness and weakness.   Endo/Heme/Allergies:  Does not bruise/bleed easily.   Psychiatric/Behavioral: Negative.         OBJECTIVE:   Vital Signs:  Pulse: 90 (05/15/24 1314)  BP: 116/79 (05/15/24 1314)    Physical Exam:  Constitutional: Patient sitting comfortably in chair. Appears well developed and well nourished.  Skin: Exposed areas are intact without abnormal markings, rashes or other lesions.  HEENT: Normocephalic. Normal conjunctivae.  Cardiovascular: Normal rate and regular rhythm.  Respiratory: Chest wall rises and falls symmetrically, without signs of respiratory distress.  Abdomen: Soft and non-tender.  Extremities: Warm and without edema. Calves supple, non-tender.  Psych/Behavior: Normal affect.    Neurological:    Mental status: Alert and oriented. Conversational and appropriate.       Cranial Nerves: VFF to confrontation. PERRL. EOMI without nystagmus. Facial STLT normal and symmetric. Strong, symmetric muscles of mastication. Facial strength full and symmetric. Hearing equal bilaterally to finger rub. Palate and uvula rise and fall normally in midline. Shoulder shrug 5/5 strength. Tongue midline.     Motor:    Upper:  Deltoids Triceps Biceps WE  WF     R 5/5 5/5 5/5 5/5 5/5 5/5    L 5/5 5/5 5/5 5/5 5/5 5/5      Lower:  HF KE KF DF PF EHL    R 5/5 5/5 5/5 5/5 5/5 5/5    L 5/5 5/5 5/5 5/5 5/5 5/5     Sensory: Intact sensation to light touch in all extremities. Romberg negative.    Reflexes:          DTR: 2+ symmetrically throughout.     Barksdale's: Negative.     Babinski's: Negative.     Clonus: Negative.    Cerebellar: Finger-to-nose and rapid alternating movements normal.     Gait stable    Diagnostic Results:  All imaging was independently reviewed by me.    MRI brain and cervical spine, dated 5/3 and 4/22/24:  1. Chiari 1 malformation w/ tonsillar descent approx 14mm  2. Crowing of FM  3. Cervical syrinx    Thoracic MRI, 4/22/24:  No evidence of CSF leak or dural defect    ASSESSMENT/PLAN:     Problem List Items Addressed This Visit          Neuro    Chiari malformation type I (Chronic)    Relevant Orders    Case Request Operating Room: DECOMPRESSION, CHIARI MALFORMATION, BY 1ST CERVICAL VERTEBRA POSTERIOR ARCH REMOVAL (Completed)    Syrinx of spinal cord - Primary       Patient has a Chiari malformation with tonsillar descent of approximately 14 mm causing crowding of the foramen magnum.  She has an associated cervical syrinx.  Her headaches seem consistent with Chiari malformation in which they intensify with Valsalva type maneuvers.  She also has dizziness and imbalance, which may be accounted for by cervical medullary compression.  She reports right visual field deficit due to unclear explanation as she has had a thorough but negative workup by Ophthalmology.  Based on recent MRI features a concern for intracranial hypotension was raised however she has no history to suggest CSF leakage and her headaches are not positional.  Given the extent of her Chiari malformation I would not recommend a lumbar puncture.  I believe she has sequela of Chiari 1 malformation and would be a candidate for surgery.    - Surgery scheduled for 6/6/24 at [facility]  -  Preop clearance needed from PCP (Dr Hawthorne)  - Preop labs, EKG, CXR ordered   - Preop PAT appointment requested  - STOP TAKING ASPIRIN and WARFARIN  7 DAYS PRIOR TO  AND 10-14 DAYS AFTER SURGERY      The patient understands and agrees with the plan of care. All questions were answered.    Time spent on this encounter: 60 minutes. This includes face-to-face time and non-face to face time preparing to see the patient (eg, review of tests), obtaining and/or reviewing separately obtained history, documenting clinical information in the electronic or other health record, independently interpreting results and communicating results to the patient/family/caregiver, or care coordinator.          .

## 2024-05-15 NOTE — H&P (VIEW-ONLY)
CHIEF COMPLAINT:  Chiari malformation    HPI:  Elva Burroughs is a 53 y.o.  female with below PMH including RIS and prior strokes/TIA for which she is on warfarin and ASA, who is referred to me by Dr Sutherland for evaluation of chiari malformation.  She reports headaches that are frontal and occipital in location.  Her headaches used to be daily but now occur from every other week to 1-2 times per week.  She finds that Tylenol and aspirin have been helpful.  She reports that coughing and bending over intensify her headaches.  She does not think that her headaches are positional however she finds that resting or lying down will help her headaches.    She reports imbalance and gait disturbance.    She also reports dizziness and blurry vision.  She has been evaluated by Ophthalmology who reports that she has 2020 vision and does not have any papilledema.  The source of her blurry vision remains unclear.      Whenever her recent MRIs demonstrated features of possible intracranial hypotension however she has no confirmed evidence of a CSF leak or any spinal trauma from which a leak could have resulted.  She did faint when she had her stroke but never has injured her spine.    Review of patient's allergies indicates:   Allergen Reactions    Pcn [penicillins] Other (See Comments)     unknown       Past Medical History:   Diagnosis Date    Anemia     Anticoagulated on Coumadin     DVT (deep venous thrombosis)     Headache     Hypertension      Past Surgical History:   Procedure Laterality Date    PULMONARY EMBOLISM SURGERY      TUBAL LIGATION       Family History   Problem Relation Name Age of Onset    Arthritis Mother      Cancer Mother      Depression Mother      Early death Mother      Heart disease Mother      Hypertension Mother      Mental illness Mother      Miscarriages / Stillbirths Mother       Social History     Tobacco Use    Smoking status: Never     Passive exposure: Never    Smokeless tobacco: Never    Substance Use Topics    Alcohol use: No    Drug use: No        Review of Systems   Constitutional: Negative.    HENT:  Negative for congestion, ear discharge, ear pain, hearing loss, nosebleeds, sinus pain and tinnitus.    Eyes:  Positive for blurred vision.   Respiratory: Negative.     Cardiovascular:  Negative for chest pain, palpitations, claudication and leg swelling.   Gastrointestinal:  Negative for abdominal pain, blood in stool, constipation, diarrhea, melena and vomiting.   Genitourinary:  Negative for flank pain, frequency and urgency.   Musculoskeletal:  Negative for falls.   Skin: Negative.    Neurological:  Positive for dizziness and headaches. Negative for tingling, tremors, sensory change, speech change, focal weakness, seizures, loss of consciousness and weakness.   Endo/Heme/Allergies:  Does not bruise/bleed easily.   Psychiatric/Behavioral: Negative.         OBJECTIVE:   Vital Signs:  Pulse: 90 (05/15/24 1314)  BP: 116/79 (05/15/24 1314)    Physical Exam:  Constitutional: Patient sitting comfortably in chair. Appears well developed and well nourished.  Skin: Exposed areas are intact without abnormal markings, rashes or other lesions.  HEENT: Normocephalic. Normal conjunctivae.  Cardiovascular: Normal rate and regular rhythm.  Respiratory: Chest wall rises and falls symmetrically, without signs of respiratory distress.  Abdomen: Soft and non-tender.  Extremities: Warm and without edema. Calves supple, non-tender.  Psych/Behavior: Normal affect.    Neurological:    Mental status: Alert and oriented. Conversational and appropriate.       Cranial Nerves: VFF to confrontation. PERRL. EOMI without nystagmus. Facial STLT normal and symmetric. Strong, symmetric muscles of mastication. Facial strength full and symmetric. Hearing equal bilaterally to finger rub. Palate and uvula rise and fall normally in midline. Shoulder shrug 5/5 strength. Tongue midline.     Motor:    Upper:  Deltoids Triceps Biceps WE  WF     R 5/5 5/5 5/5 5/5 5/5 5/5    L 5/5 5/5 5/5 5/5 5/5 5/5      Lower:  HF KE KF DF PF EHL    R 5/5 5/5 5/5 5/5 5/5 5/5    L 5/5 5/5 5/5 5/5 5/5 5/5     Sensory: Intact sensation to light touch in all extremities. Romberg negative.    Reflexes:          DTR: 2+ symmetrically throughout.     Barksdale's: Negative.     Babinski's: Negative.     Clonus: Negative.    Cerebellar: Finger-to-nose and rapid alternating movements normal.     Gait stable    Diagnostic Results:  All imaging was independently reviewed by me.    MRI brain and cervical spine, dated 5/3 and 4/22/24:  1. Chiari 1 malformation w/ tonsillar descent approx 14mm  2. Crowing of FM  3. Cervical syrinx    Thoracic MRI, 4/22/24:  No evidence of CSF leak or dural defect    ASSESSMENT/PLAN:     Problem List Items Addressed This Visit          Neuro    Chiari malformation type I (Chronic)    Relevant Orders    Case Request Operating Room: DECOMPRESSION, CHIARI MALFORMATION, BY 1ST CERVICAL VERTEBRA POSTERIOR ARCH REMOVAL (Completed)    Syrinx of spinal cord - Primary       Patient has a Chiari malformation with tonsillar descent of approximately 14 mm causing crowding of the foramen magnum.  She has an associated cervical syrinx.  Her headaches seem consistent with Chiari malformation in which they intensify with Valsalva type maneuvers.  She also has dizziness and imbalance, which may be accounted for by cervical medullary compression.  She reports right visual field deficit due to unclear explanation as she has had a thorough but negative workup by Ophthalmology.  Based on recent MRI features a concern for intracranial hypotension was raised however she has no history to suggest CSF leakage and her headaches are not positional.  Given the extent of her Chiari malformation I would not recommend a lumbar puncture.  I believe she has sequela of Chiari 1 malformation and would be a candidate for surgery.    - [facility]  -  Preop clearance needed from PCP (Dr Hawthorne)  - Preop labs, EKG, CXR ordered   - Preop PAT appointment requested  - STOP TAKING ASPIRIN and WARFARIN  7 DAYS PRIOR TO  AND 10-14 DAYS AFTER SURGERY      The patient understands and agrees with the plan of care. All questions were answered.    Time spent on this encounter: 60 minutes. This includes face-to-face time and non-face to face time preparing to see the patient (eg, review of tests), obtaining and/or reviewing separately obtained history, documenting clinical information in the electronic or other health record, independently interpreting results and communicating results to the patient/family/caregiver, or care coordinator.          .

## 2024-05-16 DIAGNOSIS — Z01.818 PREOPERATIVE TESTING: Primary | ICD-10-CM

## 2024-05-16 NOTE — PRE-PROCEDURE INSTRUCTIONS
Spoke with patient and her , Mata Burroughs. She stated has not had any problem with anesthesia in the past. Will need medical optimization by Dr. Hawthorne per Dr. León. Will need poc appt, labs,ua,and EKG.  Our  will call to set up these appts.She said DR. Josué Kauffman prescribes her ASA 81 and Coumadin. I will ask Dr.Abou Kauffman for ASA 81 and Coumadin instructions.( Dr. León would like to stop Aspirin and Coumadin 7 days prior to surgery and 10-14 days after surgery)    Preop instructions given. Hold  aspirin containing products, nsaids(aleve, advil, motrin, ibuprofen, naprosyn, naproxen, voltaren, diclofenac), vitamins ( Vitamin C) and supplements one week prior to surgery.     May take Tylenol. Await instructions from Dr. Hilary Kauffman for Aspirin 81 mg and Coumadin. ( Dr. León would like to stop Aspirin and Coumadin 7 days prior to surgery and 10-14 days after surgery)( Also sent to [facility] portal)  Verbalizes understanding.

## 2024-05-16 NOTE — ANESTHESIA PAT ROS NOTE
5/31/2024   Elva Burroughs is a 53 y.o., female with CHIARI MALFORMATION arrives for anesthesia assessment and preop instructions.      Pre-op Assessment    I have reviewed the Patient Summary Reports.     I have reviewed the Nursing Notes. I have reviewed the NPO Status.   I have reviewed the Medications.     Review of Systems  Anesthesia Hx:      5/17/24 Cardiology referral for IVC; unable to stop Coumadin.  Will transition to Lovenox               Denies Personal Hx of Anesthesia complications.                    Social:  Non-Smoker, No Alcohol Use       Hematology/Oncology:                   Hematology Comments:   Iron deficiency anemia secondary to inadequate dietary iron intake     Chronic deep vein thrombosis (DVT) of popliteal vein of left lower extremity    Chronic anticoagulation    Hypercoagulable state                        EENT/Dental:   Eyes: Visual Impairment               Vision loss   (blurred peripheral vision).            Cardiovascular:     Hypertension                   5/30/24 TELEPHONE MESSAGE:  PCP Doctor Sung advised that she can not stop taking Warfain but instead to get a port to take the place of warfain. 'Pt says she would have to get an anti colgate injected.'      5/17/2024   Due to patient's hypercoagulable state she will be referred to Cardiology for an IVC filter and will be transitioned to Lovenox prior to surgery.         2023 ECHO  Final Impressions   1. The study quality is good.   2. The left ventricle is normal in size. Global left ventricular   systolic function is normal. The left ventricular ejection fraction is   69%. Left ventricular diastolic function is indeterminate. Noted left   ventricular hypertrophy. It is mild.    3. The right ventricle is normal in size. Right ventricular systolic   function is normal.              Mitral Regurgitation (MR), mild       Deep Venous Thrombosis (DVT), Chronic Venous Occlusion Chronic deep vein thrombosis (DVT) of popliteal  vein of left lower extremity    Patient will need IVC filter per cardiology prior to surgical clearance due to hypercoagulable state and need for chronic anticoagulation.  She will need to be transitioned to Lovenox prior to her surgery.  She is scheduled for pre-op testing on 5/31/24           Hypertension         Pulmonary:       Denies Shortness of breath.  Sleep Apnea   PULMONARY EMBOLISM SURGERY        Obstructive Sleep Apnea (JARAD).           Musculoskeletal:   Musculoskeletal General/Symptoms:   Gait problem (unsteady)  Dizziness  Fatigue       Cervical Spine Disorder, Cervical Disc Disease 4/22/24 MRI Impression:   1. There is a cystic area 6 x 2 mm posterior to C3 and a area of increased T2 signal in the cervical cord from C 3 through C5 with no enhancement suggesting probable syrinx (syringomyelia)  2. C5-6 focal right paracentral disc extrusion or bony proliferation compressing the cord on the right with mild spinal stenosis moderate right lateral recess and mild neural foramina narrowing  3. At C6-7 focal central protrusion extrusion centrally effacing the cord contributing to mild spinal stenosis  4. Chiari 1 malformation   4/22/24  Electronically signed by:Kaylan Wilhelm MD Thoracic Spine Disorders   4/22/24 THORACIC MRI  1. On the  film there is abnormal linear signal throughout the cervical spine refer to MRI of the cervical spine  2. Disc desiccation throughout and small right paracentral protrusion or bulge at T7-8 and no compression of the cord or spinal stenosis  Electronically signed by:Kaylan Wilhelm MD  Date:     04/22/2024       Neurological:   CVA   Headaches Seizures     Dx of Headaches      Seizure Disorder, Absence ([place])     Chronic tension-type headache, not intractable    2.12.24 BRAIN MRI Impression:   1. No acute findings or significant change since 07/27/2023  2. Several nonenhancing white matter lesions, appearance suggesting possible combination of demyelination and old  lacunar infarcts  3. 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  Cervical cord syrinx is not as well delineated as on prior exams   Electronically signed by:Farida Almaguer MD  Date:                                            02/12/2024     Nervous System Malformations, Chiari Malformation          CVA - Cerebrovasular Accident , Most recent CVA was on 2017 ischemic stroke , has had >1 stroke     Denies Spinal Cord Injury        Neuromuscular Disease       Endocrine:     Overweight with body mass index (BMI) of 28 to 28.9 in adult    Multiple thyroid nodules    Impaired fasting glucose       Denies Thyroid Disease                 Physical Exam  General: Well nourished, Cooperative, Alert and Oriented    Airway:  Mouth Opening: Normal  Tongue: Normal  Neck ROM: Normal ROM          Anesthesia Assessment: Preoperative EQUATION    Planned Procedure: Procedure(s) (LRB):  DECOMPRESSION, CHIARI MALFORMATION, BY 1ST CERVICAL VERTEBRA POSTERIOR ARCH REMOVAL (Bilateral)  Requested Anesthesia Type:General  Surgeon: Juan Ramon León DO  Service: Neurosurgery  Known or anticipated Date of Surgery:6/6/2024    Surgeon notes: reviewed    Electronic QUestionnaire Assessment completed via nurse interview with patient.        Triage considerations:       Previous anesthesia records:No problems and Not available    Last PCP note: outside [facility]   Subspecialty notes: Hematology/Oncology, Neurology, Neurosurgery    Other important co-morbidities: PER EPIC: HTN, JARAD, and CHIARI MALFORMATION TYPE I, H/O PULM EMBOLISM AND DVT H/O STROKE       Tests already available:  Available tests,  within 3 months , 3-6 months ago , within [facility] . 5/3/2024 MRI 3D RECON BRAIN W W/O CONTRAST,  W CENTRAL VEIN MS PROTOCOL, 4/22/2024 MRI CERVICAL SPINE DEMYELINATING W W/O CONTRAST, MRI THORACIC SPINE DEMYELINATING W W/O CONTRAST, MRA BRAIN, 2/12/2024 MRI BRAIN DEMYELINATING W W/O CONTRAST            Instructions given. (See in Nurse's  note)    Optimization:  Anesthesia Preop Clinic Assessment  Indicated    Medical Opinion Indicated           Plan:    Testing:  BMP, CBC, EKG, PT/INR, PTT, T&S, and UA   Pre-anesthesia  visit       Visit focus: concerns in complex and/or prolonged anesthesia, COMORBIDITIES     Consultation:IM Perioperative Hospitalist PER DR MARTINEZ     Patient  has previously scheduled Medical Appointment:5/22 ELSA, 5/23 PT, 5/28 PT, 5/30 PT, 6/3 NEURO, 6/4 PT, 6/6 PT     Navigation: Tests Scheduled. TBD             Consults scheduled.TBD             Results will be tracked by Preop Clinic.  5/16 Will get PT/INR in am of surgery.   Sabi Dahl RN BSN

## 2024-05-20 ENCOUNTER — TELEPHONE (OUTPATIENT)
Dept: PREADMISSION TESTING | Facility: HOSPITAL | Age: 54
End: 2024-05-20
Payer: COMMERCIAL

## 2024-05-20 NOTE — TELEPHONE ENCOUNTER
----- Message from Sabi Dahl RN sent at 5/16/2024  3:17 PM CDT -----  Surgery 6/6  Please schedule Dr Hawthorne per Dr. León, poc, labs, ua, and ekg.   Thanks!

## 2024-05-30 ENCOUNTER — TELEPHONE (OUTPATIENT)
Dept: NEUROSURGERY | Facility: CLINIC | Age: 54
End: 2024-05-30
Payer: COMMERCIAL

## 2024-05-30 NOTE — DISCHARGE INSTRUCTIONS
Your surgery has been scheduled for:______6/6/2024____________________________________    You should report to:  ____[facility], located on the [place] side of the first floor of the           [facility] ([phone])  __x__[facility], located on the [facility] side of the            Second floor of the [facility] ([phone])  ____3rd Floor [facility] located on the [facility] side of the [facility] [phone]  ____[facility]: located at [address] [facility] [place] [address]. [address].     Please Note   Tell your doctor if you take Aspirin, products containing Aspirin, herbal medications  or blood thinners, such as Coumadin, Ticlid, or Plavix.  (Consult your provider regarding holding or stopping before surgery).  Arrange for someone to drive you home following surgery.  You will not be allowed to leave the surgical facility alone or drive yourself home following sedation and anesthesia.        Before Surgery  Stop taking all herbal medications, vitamins, and supplements 7 days prior to surgery  No Motrin/Advil (Ibuprofen) 7 days before surgery  No Aleve (Naproxen) 7 days before surgery  Stop Taking Asprin, products containing Asprin __7___days before surgery  Do not stop Coumadin--bridge with Lovenox  No Goody's/BC  Powder 7 days before surgery  Refrain from drinking alcoholic beverages for 24hours before and after surgery  Stop or limit smoking ___7______days before surgery  You may take Tylenol for pain    Night before Surgery  Do not eat or drink after midnight  Take a shower or bath (shower is recommended).  Bathe with Hibiclens soap or an antibacterial soap from the neck down.  If not supplied by your surgeon, hibiclens soap will need to be purchased over the counter in pharmacy.  Rinse soap off thoroughly.  Shampoo your hair with your regular shampoo    The Day of  Surgery  Take another bath or shower with hibiclens or any antibacterial soap, to reduce the chance of infection.  Take heart and blood pressure medications with a small sip of water, as advised by the perioperative team.  Do not take fluid pills  You may brush your teeth and rinse your mouth, but do not swall any additional water.   Do not apply perfumes, powder, body lotions or deodorant on the day of surgery.  Nail polish should be removed.  Do not wear makeup or moisturizer  Wear comfortable clothes, such as a button front shirt and loose fitting pants.  Leave all jewelry, including body piercings, and valuables at home.    Bring any devices you will neeed after surgery such as crutches or canes.  If you have sleep apnea, please bring your CPAP machine  In the event that your physical condition changes including the onset of a cold or respiratory illness, or if you have to delay or cancel your surgery, please notify your surgeon.     Anesthesia: General Anesthesia     You are watched continuously during your procedure by your anesthesia provider.     Youre due to have surgery. During surgery, youll be given medicine called anesthesia or anesthetic. This will keep you comfortable and pain-free. Your anesthesia provider will use general anesthesia.  What is general anesthesia?  General anesthesia puts you into a state like deep sleep. It goes into the bloodstream (IV anesthetics), into the lungs (gas anesthetics), or both. You feel nothing during the procedure. You will not remember it. During the procedure, the anesthesia provider monitors you continuously. He or she checks your heart rate and rhythm, blood pressure, breathing, and blood oxygen.  IV anesthetics. IV anesthetics are given through an IV line in your arm. Theyre often given first. This is so you are asleep before a gas anesthetic is started. Some kinds of IV anesthetics relieve pain. Others relax you. Your doctor will decide which kind is best in  your case.  Gas anesthetics. Gas anesthetics are breathed into the lungs. They are often used to keep you asleep. They can be given through a facemask or a tube placed in your larynx or trachea (breathing tube).  If you have a facemask, your anesthesia provider will most likely place it over your nose and mouth while youre still awake. Youll breathe oxygen through the mask as your IV anesthetic is started. Gas anesthetic may be added through the mask.  If you have a tube in the larynx or trachea, it will be inserted into your throat after youre asleep.  Anesthesia tools and medicines  You will likely have:  IV anesthetics. These are put into an IV line into your bloodstream.  Gas anesthetics. You breathe these anesthetics into your lungs, where they pass into your bloodstream.  Pulse oximeter. This is a small clip that is attached to the end of your finger. This measures your blood oxygen level.  Electrocardiography leads (electrodes). These are small sticky pads that are placed on your chest. They record your heart rate and rhythm.  Blood pressure cuff. This reads your blood pressure.  Risks and possible complications  General anesthesia has some risks. These include:  Breathing problems  Nausea and vomiting  Sore throat or hoarseness (usually temporary)  Allergic reaction to the anesthetic  Irregular heartbeat (rare)  Cardiac arrest (rare)   Anesthesia safety  Follow all instructions you are given for how long not to eat or drink before your procedure.  Be sure your doctor knows what medicines and drugs you take. This includes over-the-counter medicines, herbs, supplements, alcohol or other drugs. You will be asked when those were last taken.  Have an adult family member or friend drive you home after the procedure.  For the first 24 hours after your surgery:  Do not drive or use heavy equipment.  Do not make important decisions or sign legal documents. If important decisions or signing legal documents is  necessary during the first 24 hours after surgery, have a trusted family member or spouse act on your behalf.  Avoid alcohol.  Have a responsible adult stay with you. He or she can watch for problems and help keep you safe.  Date Last Reviewed: 12/1/2016 © 2000-2017 [employer]. [address] [place] [address], [place] [address]. All rights reserved. This information is not intended as a substitute for professional medical care. Always follow your healthcare professional's instructions.

## 2024-05-30 NOTE — TELEPHONE ENCOUNTER
Returned call and spoke to pt's . Advised that she should still go to all of her scheduled tests and visits tomorrow so this can be discussed in detail with the pre-op clearance/anesthesia teams. Let him know that the preop dept will discuss all of the tests results and advise Dr. León if they are unable to safely proceed with surgery. Pt's  understood and said they would definitely be at the appts tomorrow.     ----- Message from Viv Avelar sent at 5/30/2024  9:10 AM CDT -----  Regarding: Can not stop Warfain  Contact: Pt @ [phone]  Pt is calling to speak with doctor León in regards to her upcoming procedure. Pt states her PCP Doctor Almaraz advised that she can not stop taking Warfain but instead to get a port to take the place of warfain. Pt says she would have to get an anti colgate injected. Should pt still come for her labs and scheduled procedure.. Please c/b to advise.. Thanks

## 2024-05-31 ENCOUNTER — OFFICE VISIT (OUTPATIENT)
Dept: INTERNAL MEDICINE | Facility: CLINIC | Age: 54
End: 2024-05-31
Payer: COMMERCIAL

## 2024-05-31 ENCOUNTER — HOSPITAL ENCOUNTER (OUTPATIENT)
Dept: CARDIOLOGY | Facility: CLINIC | Age: 54
Discharge: HOME | End: 2024-05-31
Payer: COMMERCIAL

## 2024-05-31 ENCOUNTER — HOSPITAL ENCOUNTER (OUTPATIENT)
Dept: PREADMISSION TESTING | Facility: HOSPITAL | Age: 54
Discharge: HOME | End: 2024-05-31
Attending: ANESTHESIOLOGY
Payer: COMMERCIAL

## 2024-05-31 ENCOUNTER — LAB VISIT (OUTPATIENT)
Dept: LAB | Facility: HOSPITAL | Age: 54
End: 2024-05-31
Attending: ANESTHESIOLOGY
Payer: COMMERCIAL

## 2024-05-31 VITALS
HEART RATE: 92 BPM | WEIGHT: 184 LBS | RESPIRATION RATE: 16 BRPM | HEIGHT: 67 IN | SYSTOLIC BLOOD PRESSURE: 123 MMHG | DIASTOLIC BLOOD PRESSURE: 75 MMHG | BODY MASS INDEX: 28.88 KG/M2

## 2024-05-31 DIAGNOSIS — Z01.818 PREOPERATIVE TESTING: ICD-10-CM

## 2024-05-31 DIAGNOSIS — K21.9 GASTROESOPHAGEAL REFLUX DISEASE, UNSPECIFIED WHETHER ESOPHAGITIS PRESENT: ICD-10-CM

## 2024-05-31 DIAGNOSIS — Z01.818 PREOP EXAMINATION: Primary | ICD-10-CM

## 2024-05-31 DIAGNOSIS — Z86.16 HISTORY OF COVID-19: ICD-10-CM

## 2024-05-31 DIAGNOSIS — Z79.02 LONG TERM (CURRENT) USE OF ANTITHROMBOTICS/ANTIPLATELETS: ICD-10-CM

## 2024-05-31 DIAGNOSIS — E04.2 MULTIPLE THYROID NODULES: ICD-10-CM

## 2024-05-31 DIAGNOSIS — Z87.42 HISTORY OF MENORRHAGIA: ICD-10-CM

## 2024-05-31 DIAGNOSIS — G93.5 CHIARI MALFORMATION TYPE I: Chronic | ICD-10-CM

## 2024-05-31 DIAGNOSIS — I63.9 ISCHEMIC STROKE: Chronic | ICD-10-CM

## 2024-05-31 DIAGNOSIS — R73.9 HYPERGLYCEMIA: ICD-10-CM

## 2024-05-31 DIAGNOSIS — G47.33 OBSTRUCTIVE SLEEP APNEA (ADULT) (PEDIATRIC): ICD-10-CM

## 2024-05-31 DIAGNOSIS — R60.9 EDEMA, UNSPECIFIED TYPE: ICD-10-CM

## 2024-05-31 DIAGNOSIS — R41.82 ALTERED MENTAL STATUS, UNSPECIFIED ALTERED MENTAL STATUS TYPE: ICD-10-CM

## 2024-05-31 DIAGNOSIS — I10 ESSENTIAL HYPERTENSION: Chronic | ICD-10-CM

## 2024-05-31 DIAGNOSIS — R42 DIZZINESS: ICD-10-CM

## 2024-05-31 DIAGNOSIS — Z79.01 CHRONIC ANTICOAGULATION: Chronic | ICD-10-CM

## 2024-05-31 PROBLEM — R73.01 IMPAIRED FASTING GLUCOSE: Status: RESOLVED | Noted: 2023-10-18 | Resolved: 2024-05-31

## 2024-05-31 PROBLEM — Z71.2 ENCOUNTER TO DISCUSS TEST RESULTS: Status: RESOLVED | Noted: 2022-06-02 | Resolved: 2024-05-31

## 2024-05-31 LAB
ABO + RH BLD: NORMAL
ANION GAP SERPL CALC-SCNC: 7 MMOL/L (ref 8–16)
APTT PPP: 39.6 SEC (ref 21–32)
BASOPHILS # BLD AUTO: 0.03 K/UL (ref 0–0.2)
BASOPHILS NFR BLD: 0.6 % (ref 0–1.9)
BLD GP AB SCN CELLS X3 SERPL QL: NORMAL
BUN SERPL-MCNC: 10 MG/DL (ref 6–20)
CALCIUM SERPL-MCNC: 9.8 MG/DL (ref 8.7–10.5)
CHLORIDE SERPL-SCNC: 105 MMOL/L (ref 95–110)
CO2 SERPL-SCNC: 30 MMOL/L (ref 23–29)
CREAT SERPL-MCNC: 0.9 MG/DL (ref 0.5–1.4)
DIFFERENTIAL METHOD BLD: ABNORMAL
EOSINOPHIL # BLD AUTO: 0.1 K/UL (ref 0–0.5)
EOSINOPHIL NFR BLD: 1 % (ref 0–8)
ERYTHROCYTE [DISTWIDTH] IN BLOOD BY AUTOMATED COUNT: 14.3 % (ref 11.5–14.5)
EST. GFR  (NO RACE VARIABLE): >60 ML/MIN/1.73 M^2
ESTIMATED AVG GLUCOSE: 111 MG/DL (ref 68–131)
GLUCOSE SERPL-MCNC: 95 MG/DL (ref 70–110)
HBA1C MFR BLD: 5.5 % (ref 4–5.6)
HCT VFR BLD AUTO: 47.1 % (ref 37–48.5)
HGB BLD-MCNC: 14.3 G/DL (ref 12–16)
IMM GRANULOCYTES # BLD AUTO: 0.01 K/UL (ref 0–0.04)
IMM GRANULOCYTES NFR BLD AUTO: 0.2 % (ref 0–0.5)
LYMPHOCYTES # BLD AUTO: 2.3 K/UL (ref 1–4.8)
LYMPHOCYTES NFR BLD: 44.4 % (ref 18–48)
MCH RBC QN AUTO: 26.4 PG (ref 27–31)
MCHC RBC AUTO-ENTMCNC: 30.4 G/DL (ref 32–36)
MCV RBC AUTO: 87 FL (ref 82–98)
MONOCYTES # BLD AUTO: 0.5 K/UL (ref 0.3–1)
MONOCYTES NFR BLD: 9.5 % (ref 4–15)
NEUTROPHILS # BLD AUTO: 2.3 K/UL (ref 1.8–7.7)
NEUTROPHILS NFR BLD: 44.3 % (ref 38–73)
NRBC BLD-RTO: 0 /100 WBC
OHS QRS DURATION: 80 MS
OHS QTC CALCULATION: 426 MS
PLATELET # BLD AUTO: 325 K/UL (ref 150–450)
PMV BLD AUTO: 9.3 FL (ref 9.2–12.9)
POTASSIUM SERPL-SCNC: 3.8 MMOL/L (ref 3.5–5.1)
RBC # BLD AUTO: 5.41 M/UL (ref 4–5.4)
SODIUM SERPL-SCNC: 142 MMOL/L (ref 136–145)
SPECIMEN OUTDATE: NORMAL
WBC # BLD AUTO: 5.16 K/UL (ref 3.9–12.7)

## 2024-05-31 PROCEDURE — 1160F RVW MEDS BY RX/DR IN RCRD: CPT | Mod: CPTII,S$GLB,, | Performed by: HOSPITALIST

## 2024-05-31 PROCEDURE — 80048 BASIC METABOLIC PNL TOTAL CA: CPT | Performed by: ANESTHESIOLOGY

## 2024-05-31 PROCEDURE — 86850 RBC ANTIBODY SCREEN: CPT | Performed by: ANESTHESIOLOGY

## 2024-05-31 PROCEDURE — 85730 THROMBOPLASTIN TIME PARTIAL: CPT | Performed by: ANESTHESIOLOGY

## 2024-05-31 PROCEDURE — 1159F MED LIST DOCD IN RCRD: CPT | Mod: CPTII,S$GLB,, | Performed by: HOSPITALIST

## 2024-05-31 PROCEDURE — 93010 ELECTROCARDIOGRAM REPORT: CPT | Mod: S$GLB,,, | Performed by: INTERNAL MEDICINE

## 2024-05-31 PROCEDURE — 99999 PR PBB SHADOW E&M-EST. PATIENT-LVL III: CPT | Mod: PBBFAC,,, | Performed by: HOSPITALIST

## 2024-05-31 PROCEDURE — 93005 ELECTROCARDIOGRAM TRACING: CPT | Mod: S$GLB,,, | Performed by: ANESTHESIOLOGY

## 2024-05-31 PROCEDURE — 99215 OFFICE O/P EST HI 40 MIN: CPT | Mod: S$GLB,,, | Performed by: HOSPITALIST

## 2024-05-31 PROCEDURE — 85025 COMPLETE CBC W/AUTO DIFF WBC: CPT | Performed by: ANESTHESIOLOGY

## 2024-05-31 PROCEDURE — 3078F DIAST BP <80 MM HG: CPT | Mod: CPTII,S$GLB,, | Performed by: HOSPITALIST

## 2024-05-31 PROCEDURE — 3008F BODY MASS INDEX DOCD: CPT | Mod: CPTII,S$GLB,, | Performed by: HOSPITALIST

## 2024-05-31 PROCEDURE — 3074F SYST BP LT 130 MM HG: CPT | Mod: CPTII,S$GLB,, | Performed by: HOSPITALIST

## 2024-05-31 PROCEDURE — 4010F ACE/ARB THERAPY RXD/TAKEN: CPT | Mod: CPTII,S$GLB,, | Performed by: HOSPITALIST

## 2024-05-31 PROCEDURE — 83036 HEMOGLOBIN GLYCOSYLATED A1C: CPT | Performed by: HOSPITALIST

## 2024-05-31 RX ORDER — ACETAMINOPHEN 500 MG
1000 TABLET ORAL DAILY PRN
COMMUNITY

## 2024-05-31 NOTE — ASSESSMENT & PLAN NOTE
Had heavy cycles up until 1-1/2 years ago    No overt GI/urionary bleeding   Likely cause of her low iron in the past is heavy cycles   She has no cancer in the family  Suggested screening colonoscopies

## 2024-05-31 NOTE — ASSESSMENT & PLAN NOTE
Mini strokes May 2022- Was dizzy, generalized weakness, fell down   Was hospitalized     Imaging   MRI 2022 May  1. White matter disease with bifrontal predominance resulting in T2 shine through.  Punctate right superimposed acute infarct is not excluded  2. Chiari 1 malformation. If this is an unknown diagnosis for the patient, recommend neurology consultation    CT May 2022    Small right basal ganglia caudate lobe lacunar infarct of uncertain age but new from 2018     May 2022 carotid  1. Increased velocities in both common carotid arteries suggesting a 50-69% stenosis but no definite focal stenosis seen visually suggesting the velocities are falsely high  2. Minimal plaque in the right bulb    July 2023    1. Multiple small supra and periventricular white matter lesions.  This is a nonspecific finding with most likely considerations of prior ischemia/lacunar infarcts versus demyelination  2. 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 If not previously accomplished, recommend neurology consultation for further evaluation    Aug 2023    Mild plaque without evidence of a hemodynamically significant ICA stenosis.     April 2024    Unremarkable cerebral MR angiogram    No one-sided week  No facial droop  No lasting stroke related deficits   Based on what I gather from the chart she seems to have small-vessel disease rather than large vessel disease                   Had staring episodes   Was taking seizure medication     Aug 2023 - Mini stroke- was found after the fact    To her understanding, she had 4 mini-stroke in the past 2 years and the last 1 was 1 year ago and her balance problems seems to be more since she had the strokes    Risk factors for stroke     Hypertension  Not a diabetic  Non tobacco smoker    Secondary prevention   Aspirin  Suggested discussion about statin

## 2024-05-31 NOTE — PROGRESS NOTES
[facility]  Progress Note    Patient Name: Elva Burroughs  MRN: [id number]  Date of Evaluation- 06/04/2024  PCP- Zion Almaraz FNP    Future cases for Elva Burroughs [[id number]]       Case ID Status Date Time [place] Procedure Provider Location    [id number] [facility] 6/6/2024  7:00  DECOMPRESSION, CHIARI MALFORMATION, BY 1ST CERVICAL VERTEBRA POSTERIOR ARCH REMOVAL Juan Ramon León,  [[id number]] [facility] OR 2ND FLR            HPI:  History of present illness- I had the pleasure of meeting this pleasant 53 y.o. lady in the pre op clinic prior to her elective  Neuro  surgery. The patient is new to me . MS Martinez was accompanied by  Mr August.    I have obtained the history by speaking to the patient and by reviewing the electronic health records.    Events leading up to surgery / History of presenting illness -    Chiari malformation type I     I have obtained the information by feet speaking to her and by speaking to her very caring and knowledgeable   She has a long history of longstanding headaches  For the past 1 year she has been having problems with balance and blurred vision on both eyes and balance problems on both sides  Her balance problems seems to be more since she had the strokes    She has been troubled with moderate-severe  headaches .  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 due to her health conditions   Pets- None  Children -2   Pregnancies - 2  C sections - None  Has help post op                    Subjective/ Objective:     Chief complaint-Preoperative evaluation, Perioperative Medical management, complication reduction plan     Active cardiac conditions- none    Revised cardiac risk index predictors- history of cerebrovascular disease    Functional capacity -Examples of physical activity , walks, can take 1 flight of stairs----- She can undertake all the above activities without  chest pain,chest tightness, Shortness of breath ,dizziness,lightheadedness making her exercise tolerance more,   than 4 Mets.       Review of Systems   Constitutional:  Negative for chills and fever.        No unusual weight changes       HENT:          Sleep apnea     Eyes:         As noted   Respiratory:          No cough , phlegm    No Hemoptysis   Cardiovascular:         As noted   Gastrointestinal:         No overt GI/ blood losses  Bowel movements- Regular    Endocrine:        Prednisone use > 20 mg daily for 3 weeks=None   Genitourinary:  Negative for dysuria.        No urinary hesitancy    Musculoskeletal:           No unusual, muscle, joint pains   Skin:  Negative for rash.   Neurological:  Negative for syncope.        No unilateral weakness   Hematological:         Current use of Anticoagulants  Yes   Psychiatric/Behavioral:          No Depression,Anxiety         No past medical history pertinent negatives.        No anesthesia, bleeding, cardiac problems , PONVwith previous surgeries/procedures.  Medications and Allergies reviewed in epic.  Mother had history of breast cancer and she gets breast cancer screening with self-exam, clinical exam and mammogram     FH- No anesthesia,bleeding  ,  in family      Physical Exam  Blood pressure 123/75, pulse 92, resp. rate 16, height 5' 7" (1.702 m), weight 83.5 kg (184 lb), last menstrual period 02/12/2023.    I offered a sheet and the presence of " a chaperone during physical examination   She was comfortable to proceed with the exam without the the presence of a chaperone        Physical Exam  Constitutional- Vitals - Body mass index is 28.82 kg/m².,   Vitals:    05/31/24 0939   BP: 123/75   Pulse: 92   Resp: 16     General appearance-Conscious,Coherent  Eyes- No conjunctival icterus,pupils  round  and reactive to light   ENT-Oral cavity- moist    , Hearing grossly normal   Neck- No thyromegaly ,Trachea -central, No jugular venous distension,   No Carotid Bruit   Cardiovascular -Heart Sounds- Normal  and  no murmur   , No gallop rhythm   Respiratory - Normal Respiratory Effort, Normal breath sounds,  no wheeze , and  no forced expiratory wheeze    Peripheral pitting pedal edema-- mild, no calf pain   Gastrointestinal -Soft abdomen, No palpable masses, Non Tender,Liver,Spleen not palpable. No-- free fluid and shifting dullness  Musculoskeletal- No finger Clubbing. Strength grossly normal   Lymphatic-No Palpable cervical, axillary,Inguinal lymphadenopathy   Psychiatric - normal effect,Orientation  Rt Dorsalis pedis pulses-palpable    Lt Dorsalis pedis pulses- palpable   Rt Posterior tibial pulses -palpable   Left posterior tibial pulses -palpable   Miscellaneous -  no renal bruit   Investigations  Lab and Imaging have been reviewed in epic.      Review of old records- Was done and information gathered regards to events leading to surgery and health conditions of significance in the perioperative period.        Preoperative cardiac risk assessment-  The patient does not have any active cardiac conditions . Revised cardiac risk index predictors- -1--.Functional capacity is more than 4 Mets. She will be undergoing a Neuro procedure that carries a Moderate Risk risk     Risk of a major Cardiac event ( Defined as death, myocardial infarction, or cardiac arrest at 30 days after noncardiac surgery), based on RCRI score     -6.0%         No further cardiac work up is  indicated prior to proceeding with the surgery     Orders Placed This Encounter    Hemoglobin A1C    enoxaparin (LOVENOX) 40 mg/0.4 mL Syrg       American Society of Anesthesiologists Physical status classification ( ASA ) class: 3  Postoperative pulmonary complication risk assessment:      ARISCAT ( Canet) risk index- risk class -  Low, if duration of surgery is under 3 hours, intermediate, if duration of surgery is over 3 hours          Assessment/Plan:     Chiari malformation type I  For surgery    Ischemic stroke  Mini strokes May 2022- Was dizzy, generalized weakness, fell down   Was hospitalized     Imaging   MRI 2022 May  1. White matter disease with bifrontal predominance resulting in T2 shine through.  Punctate right superimposed acute infarct is not excluded  2. Chiari 1 malformation. If this is an unknown diagnosis for the patient, recommend neurology consultation    CT May 2022    Small right basal ganglia caudate lobe lacunar infarct of uncertain age but new from 2018     May 2022 carotid  1. Increased velocities in both common carotid arteries suggesting a 50-69% stenosis but no definite focal stenosis seen visually suggesting the velocities are falsely high  2. Minimal plaque in the right bulb    July 2023    1. Multiple small supra and periventricular white matter lesions.  This is a nonspecific finding with most likely considerations of prior ischemia/lacunar infarcts versus demyelination  2. 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 If not previously accomplished, recommend neurology consultation for further evaluation    Aug 2023    Mild plaque without evidence of a hemodynamically significant ICA stenosis.     April 2024    Unremarkable cerebral MR angiogram    No one-sided week  No facial droop  No lasting stroke related deficits   Based on what I gather from the chart she seems to have small-vessel disease rather than large vessel disease                   Had staring episodes    Was taking seizure medication     Aug 2023 - Mini stroke- was found after the fact    To her understanding, she had 4 mini-stroke in the past 2 years and the last 1 was 1 year ago and her balance problems seems to be more since she had the strokes    Risk factors for stroke     Hypertension  Not a diabetic  Non tobacco smoker    Secondary prevention   Aspirin  Suggested discussion about statin      Essential hypertension  On nifedipine and she feels that her blood pressure is controlled    Hypertension-  Blood pressure is acceptable . I suggest continuation of ----nifedipine---- during the entire perioperative period. I suggest blood pressure  monitoring .I suggest addressing pain control as uncontrolled pain can increased blood pressure      Chronic anticoagulation  Warfarin  She has been on warfarin for a very long time and she has had 2 episodes of blood clot    1st episode was when in 1992 -2 month after her daughter's birth    Her 1st episode was pulmonary embolism  Based on her description, she seems to have had some kind of lytic therapy from a left neck access  She had trouble breathing at that time and she passed    Associated with  no reduced mobility,no long journeys, she was traveling back and forth [place] , No  cancer, no prior surgery, no Hospital stay, was on contraceptive pill    Her 1st episode of thrombosis seems to be provoked from contraceptive pill, travel, couple months postpartum    She was anticoagulated with warfarin for 6 months and stopped    She did well without blood clot for many years until she had another one in the Left leg    Her 2nd episode of blood clot was around 1999/2000  She had pain in the left leg and was found to have deep vein thrombosis on the left      Associated with no reduced mobility, no long journeys, no cancer, no prior surgery, no Hospital stay,    She was on birth control at that time    She was given warfarin for about 6 months    2022      Protein S and  protein C activity low  No antithrombin 3 deficiency  Factor 5 Leiden, prothrombin gene mutation was negative    It was decided that she needs to be and warfarin indefinitely    Her clinical history combining with that tests that I am seeing seem to be suggesting provoked thrombosis in the setting of protein C and protein S deficiency  She also has a family history of thrombosis on the maternal side with her mother, maternal aunt and maternal grandmother with thrombosis    No atrial fibrillation    Discussed a plan for perioperative anticoagulation when she comes off off warfarin for the surgery at which time she has at a higher risk of having blood clot happening    Considered    Bridging anticoagulation   Filter    Discussed benefits of each approach where IVC filter does not stops a blood clot happening whereas it might reduce the risk of embolization where as bridging anticoagulation might reduce the risk of blood clot happening    Will work with the surgeon about perioperative anticoagulation plan    She was referred over to cardiologist's for consideration of IVC filter    2023    1. The study quality is good.   2. Global left ventricular systolic function is normal. The left   ventricular ejection fraction is 60%.    3. Mild to moderate (1-2+) tricuspid regurgitation. Trace mitral   regurgitation.    4. The pulmonary artery systolic pressure is 33 mmHg.           Multiple thyroid nodules  Known difficulty swallowing  TSH-N      BMI 28.0-28.9,adult  Weight related conditions     Known to have     HTN  Sleep apnea   Acid reflux       Not troubled with / Not known to have       Type 2 Diabetes    Gout    Fatty liver       Encouraged maintaining healthy weight for improved health     Dizziness  Gets dizzy on position changes   No heart problems  Suggested to change positions gradually and to stay hydrated      Obstructive sleep apnea (adult) (pediatric)  Sleep apnea   Uses CPAP.  Suggested bringing for hospital  use .  Note that the surgical incision may be in the location of her CPAP strapping   Informed the risk of worsening sleep apnea in the perioperative period and suggest using CPAP use any time in 24 hrs ( day or night )for planned sleep     I suggest  caution with usage of medication that can cause respiratory suppression in the perioperative period    Avoidance of  supine sleep, weight gain , alcoholic beverages , care with , sedative , CNS depressant use indicated  since all of these can worsen JARAD         History of COVID-19  Did not require hospitalization, intubation or supplemental oxygen use   Had been vaccinated   Recovered from COVID    COVID screening     No fever   No cough   No SOB  No sore throat   No loss of taste or smell   No muscle aches   No nausea, vomiting , diarrhea     Long term (current) use of antithrombotics/antiplatelets  ASA    Risk , benefits of ASA use in the perioperative period discussed     Plan is to hold aspirin for surgery    Acid reflux    Does not sound Cardiac     Doing good      GERD-  I suggest continuation of the Pepcid in the perioperative period . I suggest aspiration precautions     History of menorrhagia  Had heavy cycles up until 1-1/2 years ago    No overt GI/urionary bleeding   Likely cause of her low iron in the past is heavy cycles   She has no cancer in the family  Suggested screening colonoscopies    Edema    Not known to have     Heart failure   Liver failure   Kidney failure     Edema- I suggested avoidance of added salt,avoidance of NSAID's, unless advised or ordered  and suggested Limb elevation and marc hose use     Altered mental status  Happed 4 times 2022-Aug 2023  Staring -all 4 episodes   For a few minutes     April 2024    Unremarkable cerebral MR angiogram     Aug 2023    Mild plaque without evidence of a hemodynamically significant ICA stenosis.     MRI July 2023    Multiple small supra and periventricular white matter lesions. This is a nonspecific  finding with most likely considerations of prior ischemia/lacunar infarcts versus demyelination     It is unclear to me , if she had TIA / seizure  No longer taking Seizure medication   Under Neurology care         Preventive perioperative care    Thromboembolic prophylaxis:  Her risk factors for thrombosis include surgical procedure, previous history of thrombosis, and age.I suggest  thromboembolic prophylaxis ( mechanical/pharmacological, weighing the risk benefits of pharmacological agent use considering stormy procedural bleeding )  during the perioperative period.I suggested being active in the post operative period.      Postoperative pulmonary complication prophylaxis-Risk factors for post operative pulmonary complications include ASA class >2- I suggest incentive spirometry use, early ambulation, and end tidal carbon dioxide monitoring  , oral care , head end of bed elevation      Renal complication prophylaxis- . I suggest keeping her well hydrated  in the perioperative period.    Surgical site Infection Prophylaxis-I  suggest appropriate antibiotic for Prophylaxis against Surgical site infections  No reported Staph infection  Skin antibacterial discussed        This visit was focused on Preoperative evaluation, Perioperative Medical management, complication reduction plans. I suggest that the patient follows up with primary care or relevant sub specialists for ongoing health care.    I appreciate the opportunity to be involved in this patients care. Please feel free to contact me if there were any questions about this consultation.    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 and has had 2 episodes of thrombosis  She also has a family history of thrombosis   She is maintained on long-term anticoagulation  Her clinical history of thrombosis suggest a combination of provoked thrombosis with birth control pill use at with underlying thrombophilic state    With regards to perioperative anticoagulation plan my plan is to work with the surgeon and primary care about the risks benefits of bridging anticoagulation and IVC filter placement   IVC filter placement will not prevent thrombosis but may help with reducing the risk of embolization whereas bridging anticoagulation reduces the risk of thrombosis and embolization   Will work with the surgeon about the specifics of bridging Lovenox    I have spent --80---- minutes of time which includes, time spent to prepare to see the patient , obtaining history ,performing examination, counseling/Educating the patient , Documenting clinical information in the record    --  5/31/2024- 1816     Chart reviewed   -06/2022 hypercoagulable state work including FV Leiden mutation, PGM, Antiphospholipid syndrome antibodies, antithrombin III deficiency, PNH profile, Francisco 2 mutation and MPN panel all unremarkable   -06/2022 Protein s/c deficiency noted but that can be caused while on coumadin      Checked for OTC medication     A1c 5.5 - no diabetes or prediabetes   Hb, HCT,PLT-N  Creatinine stable     EKG personally reviewed - reportedly showed     Sinus rhythm with short CA   Nonspecific ST and/or T wave abnormalities   Abnormal ECG   When compared with ECG of 09-JAN-2023 11:15,   No significant change was found     Messaged Hematologist   --  6/3/2024- 11 27     Corresponded with Hematologist   Plan for holding coumadin with bridging lovenox   Will work with the Hematologist about  bridging with prophylactic dose Lovenox or full dose lovenox   Given that we are deciding to  bridge with lovenox, we probably do not need IVC filter  --  6/3/2024- 12 55    Corresponded with the Hematologist   No need for IVC filter     Prophylactic dose of lovenox      Resume coumadin after procedure per surgeon discretion   --  6/4/2024- 11 41    Perioperative anticoagulation plan    Date of surgery 6/13   Planned Anesthesia- General  Anticoagulated with Warfarin   Plan for holding Warfarin  with bridging lovenox   She does home INR testing - once a week   On Warfarin 2.5 mg once a day for about 1 month  INR usually 2.7- 2.2- 2.4    Discussed that usually warfarin is held 5-7 days prior to surgery   Given that surgery is Neuro surgery - will hold 7 days pre op   Last day pf pre op use of Warfarin 6/5 - usually takes at Lunch time   Suggested monitoring INR daily starting 6/6 and start Lovenox when INR is less than 2  Creatinine clearance 80   Lovenox 40 mg sub cutaneous once a day in the morning time once INR is less than 2  Last day of Lovenx use is the AM of 6/12 prior to 7 AM  Sharp safety discussed       APTT elevated 5/31  INR was 2.7 on 5/30   No coagulation factor deficiency, liver problem   Known to have Vitamin K deficiency-  with regards to the cause of low vitamin K - not on Vitamin E, Not on antibiotic, Liver problems , malabsorption   I wonder of the cause of elevated APTT is warfarin use       Discussed that she does need IVC filter       Happed 4 times 2022-Aug 2023  Staring -all 4 episodes   For a few minutes     April 2024    Unremarkable cerebral MR angiogram     Aug 2023    Mild plaque without evidence of a hemodynamically significant ICA stenosis.     MRI July 2023    Multiple small supra and periventricular white matter lesions. This is a nonspecific finding with most likely considerations of prior ischemia/lacunar infarcts versus demyelination     It is unclear to me , if she had TIA / seizure  No longer taking Seizure medication   Under Neurology care         Called to discuss  perioperative anticoagulation   Virtual discussion   In [place]    Not using CPAP due to headaches    EKG personally reviewed - reportedly showed     Sinus rhythm with short AK   Nonspecific ST and/or T wave abnormalities   Abnormal ECG   When compared with ECG of 09-JAN-2023 11:15,   No significant change was found     Not known to have heart rhythm problems     No changes to Medication or health  No Overt GI/ blood losses   --  6/4/2024- 1313  --  6/7/2024- 1749  Corresponded with the hematologist about the perioperative anticoagulation plan  No plan for IVC filter  Bridge with lovenox     Called to follow up   Spoke to her   Doing good   Last took warfarin on the 5th of June  INR 2.4 Yesterday   INR 1.6 today- started Lovenox today  Stay on the until June 12th morning time prior to 7 AM  Doing well  No bleeding problems  Discussed sharp safety  --  6/11/2024- 1749    Suggest checking INR , APTT on the morning of surgery to ensure that the values are appropriate for surgery   Corresponded with the surgeon- last dose of Lovenox the day before surgery , prior to 7 AM - spoke to her   Taking lovenox at about 12 noon  Called to follow up , spoke to her to address any concerns with the up coming surgery or any questions on Medication instructions -  Doing well ,No changes to Medication, Health -  No bleeding   Follow up about health discussed     Last took warfarin on the 5th of June  INR 2.4 6/6  INR 1.6 6/7- started Lovenox today- 6/7   INR went up to 2.1 on 8 th ( Could not explain why it went up - error-?) Did not take Lovenox 6/8   Has been taking lovenox once a day since June 9 th   INR just now 1.1     No ( lupus , VonWillebrad disease , no coagulation factor deficiency  , Amyloidosis, Severe Vit K deficiency , liver problem, acute illness)  No choking

## 2024-05-31 NOTE — ASSESSMENT & PLAN NOTE
Not known to have     Heart failure   Liver failure   Kidney failure     Edema- I suggested avoidance of added salt,avoidance of NSAID's, unless advised or ordered  and suggested Limb elevation and marc hose use

## 2024-05-31 NOTE — OUTPATIENT SUBJECTIVE & OBJECTIVE
"Outpatient Subjective & Objective     Chief complaint-Preoperative evaluation, Perioperative Medical management, complication reduction plan     Active cardiac conditions- none    Revised cardiac risk index predictors- history of cerebrovascular disease    Functional capacity -Examples of physical activity , walks, can take 1 flight of stairs----- She can undertake all the above activities without  chest pain,chest tightness, Shortness of breath ,dizziness,lightheadedness making her exercise tolerance more,   than 4 Mets.       Review of Systems   Constitutional:  Negative for chills and fever.        No unusual weight changes       HENT:          Sleep apnea     Eyes:         As noted   Respiratory:          No cough , phlegm    No Hemoptysis   Cardiovascular:         As noted   Gastrointestinal:         No overt GI/ blood losses  Bowel movements- Regular    Endocrine:        Prednisone use > 20 mg daily for 3 weeks=None   Genitourinary:  Negative for dysuria.        No urinary hesitancy    Musculoskeletal:           No unusual, muscle, joint pains   Skin:  Negative for rash.   Neurological:  Negative for syncope.        No unilateral weakness   Hematological:         Current use of Anticoagulants  Yes   Psychiatric/Behavioral:          No Depression,Anxiety         No past medical history pertinent negatives.        No anesthesia, bleeding, cardiac problems , PONVwith previous surgeries/procedures.  Medications and Allergies reviewed in epic.  Mother had history of breast cancer and she gets breast cancer screening with self-exam, clinical exam and mammogram     FH- No anesthesia,bleeding  ,  in family      Physical Exam  Blood pressure 123/75, pulse 92, resp. rate 16, height 5' 7" (1.702 m), weight 83.5 kg (184 lb), last menstrual period 02/12/2023.    I offered a sheet and the presence of a chaperone during physical examination   She was comfortable to proceed with the exam without the the presence of a " chaperone        Physical Exam  Constitutional- Vitals - Body mass index is 28.82 kg/m².,   Vitals:    05/31/24 0939   BP: 123/75   Pulse: 92   Resp: 16     General appearance-Conscious,Coherent  Eyes- No conjunctival icterus,pupils  round  and reactive to light   ENT-Oral cavity- moist    , Hearing grossly normal   Neck- No thyromegaly ,Trachea -central, No jugular venous distension,   No Carotid Bruit   Cardiovascular -Heart Sounds- Normal  and  no murmur   , No gallop rhythm   Respiratory - Normal Respiratory Effort, Normal breath sounds,  no wheeze , and  no forced expiratory wheeze    Peripheral pitting pedal edema-- mild, no calf pain   Gastrointestinal -Soft abdomen, No palpable masses, Non Tender,Liver,Spleen not palpable. No-- free fluid and shifting dullness  Musculoskeletal- No finger Clubbing. Strength grossly normal   Lymphatic-No Palpable cervical, axillary,Inguinal lymphadenopathy   Psychiatric - normal effect,Orientation  Rt Dorsalis pedis pulses-palpable    Lt Dorsalis pedis pulses- palpable   Rt Posterior tibial pulses -palpable   Left posterior tibial pulses -palpable   Miscellaneous -  no renal bruit   Investigations  Lab and Imaging have been reviewed in epic.      Review of old records- Was done and information gathered regards to events leading to surgery and health conditions of significance in the perioperative period.    Outpatient Subjective & Objective

## 2024-05-31 NOTE — ASSESSMENT & PLAN NOTE
Sleep apnea   Uses CPAP.  Suggested bringing for hospital use .  Note that the surgical incision may be in the location of her CPAP strapping   Informed the risk of worsening sleep apnea in the perioperative period and suggest using CPAP use any time in 24 hrs ( day or night )for planned sleep     I suggest  caution with usage of medication that can cause respiratory suppression in the perioperative period    Avoidance of  supine sleep, weight gain , alcoholic beverages , care with , sedative , CNS depressant use indicated  since all of these can worsen JARAD

## 2024-05-31 NOTE — ASSESSMENT & PLAN NOTE
Weight related conditions     Known to have     HTN  Sleep apnea   Acid reflux       Not troubled with / Not known to have       Type 2 Diabetes    Gout    Fatty liver       Encouraged maintaining healthy weight for improved health

## 2024-05-31 NOTE — HPI
History of present illness- I had the pleasure of meeting this pleasant 53 y.o. lady in the pre op clinic prior to her elective  Neuro  surgery. The patient is new to me . MS Martinez was accompanied by  Mr August.    I have obtained the history by speaking to the patient and by reviewing the electronic health records.    Events leading up to surgery / History of presenting illness -    Chiari malformation type I     I have obtained the information by feet speaking to her and by speaking to her very caring and knowledgeable   She has a long history of longstanding headaches  For the past 1 year she has been having problems with balance and blurred vision on both eyes and balance problems on both sides  Her balance problems seems to be more since she had the strokes    She has been troubled with moderate-severe  headaches .  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

## 2024-05-31 NOTE — ASSESSMENT & PLAN NOTE
Warfarin  She has been on warfarin for a very long time and she has had 2 episodes of blood clot    1st episode was when in 1992 -2 month after her daughter's birth    Her 1st episode was pulmonary embolism  Based on her description, she seems to have had some kind of lytic therapy from a left neck access  She had trouble breathing at that time and she passed    Associated with  no reduced mobility,no long journeys, she was traveling back and forth [place] , No  cancer, no prior surgery, no Hospital stay, was on contraceptive pill    Her 1st episode of thrombosis seems to be provoked from contraceptive pill, travel, couple months postpartum    She was anticoagulated with warfarin for 6 months and stopped    She did well without blood clot for many years until she had another one in the Left leg    Her 2nd episode of blood clot was around 1999/2000  She had pain in the left leg and was found to have deep vein thrombosis on the left      Associated with no reduced mobility, no long journeys, no cancer, no prior surgery, no Hospital stay,    She was on birth control at that time    She was given warfarin for about 6 months    2022      Protein S and protein C activity low  No antithrombin 3 deficiency  Factor 5 Leiden, prothrombin gene mutation was negative    It was decided that she needs to be and warfarin indefinitely    Her clinical history combining with that tests that I am seeing seem to be suggesting provoked thrombosis in the setting of protein C and protein S deficiency  She also has a family history of thrombosis on the maternal side with her mother, maternal aunt and maternal grandmother with thrombosis    No atrial fibrillation    Discussed a plan for perioperative anticoagulation when she comes off off warfarin for the surgery at which time she has at a higher risk of having blood clot happening    Considered    Bridging anticoagulation   Filter    Discussed benefits of each approach where IVC filter does  not stops a blood clot happening whereas it might reduce the risk of embolization where as bridging anticoagulation might reduce the risk of blood clot happening    Will work with the surgeon about perioperative anticoagulation plan    She was referred over to cardiologist's for consideration of IVC filter    2023    1. The study quality is good.   2. Global left ventricular systolic function is normal. The left   ventricular ejection fraction is 60%.    3. Mild to moderate (1-2+) tricuspid regurgitation. Trace mitral   regurgitation.    4. The pulmonary artery systolic pressure is 33 mmHg.

## 2024-05-31 NOTE — ASSESSMENT & PLAN NOTE
Did not require hospitalization, intubation or supplemental oxygen use   Had been vaccinated   Recovered from COVID    COVID screening     No fever   No cough   No SOB  No sore throat   No loss of taste or smell   No muscle aches   No nausea, vomiting , diarrhea

## 2024-05-31 NOTE — ASSESSMENT & PLAN NOTE
On nifedipine and she feels that her blood pressure is controlled    Hypertension-  Blood pressure is acceptable . I suggest continuation of ----nifedipine---- during the entire perioperative period. I suggest blood pressure  monitoring .I suggest addressing pain control as uncontrolled pain can increased blood pressure

## 2024-05-31 NOTE — ASSESSMENT & PLAN NOTE
Gets dizzy on position changes   No heart problems  Suggested to change positions gradually and to stay hydrated

## 2024-05-31 NOTE — ASSESSMENT & PLAN NOTE
ASA    Risk , benefits of ASA use in the perioperative period discussed     Plan is to hold aspirin for surgery

## 2024-05-31 NOTE — ASSESSMENT & PLAN NOTE
Does not sound Cardiac     Doing good      GERD-  I suggest continuation of the Pepcid in the perioperative period . I suggest aspiration precautions

## 2024-06-03 ENCOUNTER — OFFICE VISIT (OUTPATIENT)
Dept: NEUROLOGY | Facility: CLINIC | Age: 54
End: 2024-06-03
Payer: COMMERCIAL

## 2024-06-03 VITALS
DIASTOLIC BLOOD PRESSURE: 86 MMHG | SYSTOLIC BLOOD PRESSURE: 127 MMHG | WEIGHT: 185.31 LBS | HEIGHT: 67 IN | BODY MASS INDEX: 29.09 KG/M2 | HEART RATE: 94 BPM

## 2024-06-03 DIAGNOSIS — G93.5 CHIARI MALFORMATION TYPE I: Chronic | ICD-10-CM

## 2024-06-03 DIAGNOSIS — R93.0 RADIOLOGICALLY ISOLATED SYNDROME: Primary | ICD-10-CM

## 2024-06-03 PROBLEM — I63.9 ISCHEMIC STROKE: Chronic | Status: RESOLVED | Noted: 2022-06-02 | Resolved: 2024-06-03

## 2024-06-03 PROCEDURE — 3044F HG A1C LEVEL LT 7.0%: CPT | Mod: CPTII,S$GLB,,

## 2024-06-03 PROCEDURE — 99215 OFFICE O/P EST HI 40 MIN: CPT | Mod: S$GLB,,,

## 2024-06-03 PROCEDURE — 3008F BODY MASS INDEX DOCD: CPT | Mod: CPTII,S$GLB,,

## 2024-06-03 PROCEDURE — 1159F MED LIST DOCD IN RCRD: CPT | Mod: CPTII,S$GLB,,

## 2024-06-03 PROCEDURE — 99999 PR PBB SHADOW E&M-EST. PATIENT-LVL III: CPT | Mod: PBBFAC,,,

## 2024-06-03 PROCEDURE — 3074F SYST BP LT 130 MM HG: CPT | Mod: CPTII,S$GLB,,

## 2024-06-03 PROCEDURE — 4010F ACE/ARB THERAPY RXD/TAKEN: CPT | Mod: CPTII,S$GLB,,

## 2024-06-03 PROCEDURE — 3079F DIAST BP 80-89 MM HG: CPT | Mod: CPTII,S$GLB,,

## 2024-06-03 PROCEDURE — 1160F RVW MEDS BY RX/DR IN RCRD: CPT | Mod: CPTII,S$GLB,,

## 2024-06-03 PROCEDURE — G2211 COMPLEX E/M VISIT ADD ON: HCPCS | Mod: S$GLB,,,

## 2024-06-03 NOTE — PROGRESS NOTES
Patient ID: Elva Burroughs is a 53 y.o. female who presents today for a routine clinic visit for RIS.  She was last seen by Dr. Sutherland on 5/9/2024.  The history was provided by the patient. She is accompanied by her .     Principle neurological diagnosis: RIS   Date of symptom onset: NA  Date of diagnosis: 5/9/2024  Disease type at diagnosis: RIS  Disease type currently: RIS  Previous therapy: NA  Current therapy: NA  Last MRI Brain: 5/3/2024 - stable with lesions indicative of demyelination   Last MRI C-spine: 4/22/2024 - no lesions, but syrinx present   Last MRI T-spine: 4/22/2024 - no lesions.  CSF: NA  JCV status: NA  Other relevant labs and tests: 4/4/2024: NMO and MOG - negative, NfL - 16.5    Subjective:     This visit was initially to discuss LP results and possible next steps for treatment.  She did not have a LP, rather she was able to see Dr. León with neurosurgery and she was scheduled for a decompression of Chiari Malformation.      She was initially scheduled for surgery on 6/6/24, but will have to postpone due to her being on warfarin.  She did see pre op on 5/31 to determine plan.  Per pre op note, I looks like they would like to bridge her to Lovenox, which they will consult with hematology.     Other than being very nervous about pending surgery, there have not been any new symptoms at this time.          SOCIAL HISTORY  Living arrangements - the patient lives with their family.  Social History     Socioeconomic History    Marital status:    Tobacco Use    Smoking status: Never     Passive exposure: Never    Smokeless tobacco: Never   Substance and Sexual Activity    Alcohol use: No    Drug use: No    Sexual activity: Yes     Partners: Male     Social Determinants of Health     Financial Resource Strain: Medium Risk (4/4/2024)    Overall Financial Resource Strain (CARDIA)     Difficulty of Paying Living Expenses: Somewhat hard   Food Insecurity: Food Insecurity Present (4/4/2024)     Hunger Vital Sign     Worried About Running Out of Food in the Last Year: Sometimes true     Ran Out of Food in the Last Year: Never true   Transportation Needs: No Transportation Needs (4/4/2024)    PRAPARE - Transportation     Lack of Transportation (Medical): No     Lack of Transportation (Non-Medical): No   Physical Activity: Unknown (4/4/2024)    Exercise Vital Sign     Minutes of Exercise per Session: 20 min   Stress: Stress Concern Present (4/4/2024)    [employer] - Occupational Stress Questionnaire     Feeling of Stress : To some extent   Housing Stability: Low Risk  (4/4/2024)    Housing Stability Vital Sign     Unable to Pay for Housing in the Last Year: No     Number of Places Lived in the Last Year: 1     Unstable Housing in the Last Year: No       Current Outpatient Medications on File Prior to Visit   Medication Sig Dispense Refill    acetaminophen (TYLENOL) 500 MG tablet Take 1,000 mg by mouth daily as needed for Pain.      ascorbic acid, vitamin C, (VITAMIN C) 500 MG tablet Take 500 mg by mouth once daily.      aspirin (ECOTRIN) 81 MG EC tablet Take 1 tablet (81 mg total) by mouth once daily. 150 tablet 2    famotidine (PEPCID) 40 MG tablet Take 1 tablet (40 mg total) by mouth once daily. 30 tablet 5    ferrous sulfate (IRON) 325 mg (65 mg iron) Tab tablet Take 325 mg by mouth once daily.      lancets (COAGUCHEK LANCETS) Misc 1 each by Misc.(Non-Drug; Combo Route) route once a week. 100 each 0    NIFEdipine (PROCARDIA-XL) 60 MG (OSM) 24 hr tablet Take 1 tablet (60 mg total) by mouth once daily. 90 tablet 3    prothrombin time/INR test metr Misc 1 Device by Misc.(Non-Drug; Combo Route) route once a week. 1 each 0    [DISCONTINUED] warfarin (COUMADIN) 2.5 MG tablet Take 2.5 mg by mouth once daily.      losartan (COZAAR) 25 MG tablet Take 1 tablet (25 mg total) by mouth once daily. (Patient not taking: Reported on 6/3/2024) 90 tablet 3    warfarin (COUMADIN) 2.5 MG  tablet Take 1 tablet by mouth once daily 30 tablet 0     No current facility-administered medications on file prior to visit.       Objective:     1. 25 foot timed walk:       No data to display                    NEURO EXAM    In general, the patient is well nourished and appears to be in no acute distress.    MENTAL STATUS: language is fluent, normal verbal comprehension, short-term and remote memory is intact, attention is normal, patient is alert and oriented x 3, fund of knowlege is appropriate by vocabulary.     Full neuro exam deferred at this time.     Imaging:     Personally reviewed.     Results for orders placed during the hospital encounter of 02/12/24    MRI Brain Demyelinating W W/O Contrast    Impression  1. No acute findings or significant change since 07/27/2023  2. Several nonenhancing white matter lesions, appearance suggesting possible combination of demyelination and old lacunar infarcts  3. 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  Cervical cord syrinx is not as well delineated as on prior exams      Electronically signed by: Farida Almaguer MD  Date:    02/12/2024  Time:    17:11    Results for orders placed during the hospital encounter of 04/22/24    MRI Cervical Spine Demyelinating W W/O Contrast    Impression  1. There is a cystic area 6 x 2 mm posterior to C3 and a area of increased T2 signal in the cervical cord from C 3 through C5 with no enhancement suggesting probable syrinx (syringomyelia)  2. C5-6 focal right paracentral disc extrusion or bony proliferation compressing the cord on the right with mild spinal stenosis moderate right lateral recess and mild neural foramina narrowing  3. At C6-7 focal central protrusion extrusion centrally effacing the cord contributing to mild spinal stenosis  4. Chiari 1 malformation      Electronically signed by: Kaylan Wilhelm MD  Date:    04/22/2024  Time:    11:40    Results for orders placed during the hospital encounter of 04/22/24    MRI  "Thoracic Spine Demyelinating W W/O Contrast    Impression  1. On the  film there is abnormal linear signal throughout the cervical spine refer to MRI of the cervical spine  2. Disc desiccation throughout and small right paracentral protrusion or bulge at T7-8 and no compression of the cord or spinal stenosis      Electronically signed by: Kaylan Wilhelm MD  Date:    04/22/2024  Time:    11:22        Labs:     No results found for: "VCBMAQRD96NA"  No results found for: "JCVINDEX", "JCVANTIBODY"  No results found for: "NJ5XIFOL", "ABSOLUTECD3", "VG6NEHZG", "ABSOLUTECD8", "KG3BNVPT", "ABSOLUTECD4", "LABCD48"  Lab Results   Component Value Date    WBC 5.16 05/31/2024    RBC 5.41 (H) 05/31/2024    HGB 14.3 05/31/2024    HCT 47.1 05/31/2024    MCV 87 05/31/2024    MCH 26.4 (L) 05/31/2024    MCHC 30.4 (L) 05/31/2024    RDW 14.3 05/31/2024     05/31/2024    MPV 9.3 05/31/2024    GRAN 2.3 05/31/2024    GRAN 44.3 05/31/2024    LYMPH 2.3 05/31/2024    LYMPH 44.4 05/31/2024    MONO 0.5 05/31/2024    MONO 9.5 05/31/2024    EOS 0.1 05/31/2024    BASO 0.03 05/31/2024    EOSINOPHIL 1.0 05/31/2024    BASOPHIL 0.6 05/31/2024     Sodium   Date Value Ref Range Status   05/31/2024 142 136 - 145 mmol/L Final     Potassium   Date Value Ref Range Status   05/31/2024 3.8 3.5 - 5.1 mmol/L Final     Chloride   Date Value Ref Range Status   05/31/2024 105 95 - 110 mmol/L Final     CO2   Date Value Ref Range Status   05/31/2024 30 (H) 23 - 29 mmol/L Final     Glucose   Date Value Ref Range Status   05/31/2024 95 70 - 110 mg/dL Final     BUN   Date Value Ref Range Status   05/31/2024 10 6 - 20 mg/dL Final     Creatinine   Date Value Ref Range Status   05/31/2024 0.9 0.5 - 1.4 mg/dL Final     Calcium   Date Value Ref Range Status   05/31/2024 9.8 8.7 - 10.5 mg/dL Final     Total Protein   Date Value Ref Range Status   09/19/2023 8.4 (H) 6.3 - 8.2 g/dL Final     Albumin   Date Value Ref Range Status   09/19/2023 4.1 3.5 - 5.2 g/dL Final " "    Total Bilirubin   Date Value Ref Range Status   09/19/2023 0.5 0.2 - 1.3 mg/dL Final     Alkaline Phosphatase   Date Value Ref Range Status   09/19/2023 83 38 - 145 U/L Final     AST   Date Value Ref Range Status   09/19/2023 29 14 - 36 U/L Final     ALT   Date Value Ref Range Status   09/19/2023 19 10 - 44 U/L Final     Anion Gap   Date Value Ref Range Status   05/31/2024 7 (L) 8 - 16 mmol/L Final     eGFR if    Date Value Ref Range Status   07/11/2022 >60 >60 mL/min/1.73 m^2 Final     eGFR if non    Date Value Ref Range Status   07/11/2022 >60 >60 mL/min/1.73 m^2 Final     Comment:     Calculation used to obtain the estimated glomerular filtration  rate (eGFR) is the CKD-EPI equation.        No results found for: "HEPBSAG", "HEPBSAB", "HEPBCAB"        MS Impression and Plan:     NEURO MULTIPLE SCLEROSIS IMPRESSION:   MS Classification:  Radiologically isolated syndrome  DMT:  No change in management  Symptom Management:  No change in symptom management     Continued diagnosis of RIS at this time given brain MRI with evidence of demyelinating lesions.  Symptoms are atypical of MS and are more likely the result of Chiari Malformation and chronic intracranial hypotension.   Will DMT discussion at this time due to patient is not mentally ready as she does have an upcoming surgery.    Will follow up in 6 months, hopefully after surgery to discuss possibly starting DMT.   Plan discussed and questions were answered to satisfaction.     Problem List Items Addressed This Visit          Neuro    Chiari malformation type I (Chronic)       Other    Radiologically isolated syndrome - Primary       I spent a total of 40 minutes on the day of the visit.This includes face to face time and non-face to face time preparing to see the patient (eg, review of tests), obtaining and/or reviewing separately obtained history, documenting clinical information in the electronic or other health record, " independently interpreting results and communicating results to the patient/family/caregiver, or care coordinator.       Miriam Alvarado, DEVYN-C

## 2024-06-04 PROBLEM — R41.82 ALTERED MENTAL STATUS: Status: ACTIVE | Noted: 2024-06-04

## 2024-06-04 RX ORDER — ENOXAPARIN SODIUM 100 MG/ML
40 INJECTION SUBCUTANEOUS DAILY
Qty: 2.8 ML | Refills: 0 | Status: ON HOLD | OUTPATIENT
Start: 2024-06-06 | End: 2024-06-15 | Stop reason: HOSPADM

## 2024-06-04 NOTE — ASSESSMENT & PLAN NOTE
Happed 4 times 2022-Aug 2023  Staring -all 4 episodes   For a few minutes     April 2024    Unremarkable cerebral MR angiogram     Aug 2023    Mild plaque without evidence of a hemodynamically significant ICA stenosis.     MRI July 2023    Multiple small supra and periventricular white matter lesions. This is a nonspecific finding with most likely considerations of prior ischemia/lacunar infarcts versus demyelination     It is unclear to me , if she had TIA / seizure  No longer taking Seizure medication   Under Neurology care

## 2024-06-11 ENCOUNTER — TELEPHONE (OUTPATIENT)
Dept: INTERNAL MEDICINE | Facility: CLINIC | Age: 54
End: 2024-06-11
Payer: COMMERCIAL

## 2024-06-11 NOTE — TELEPHONE ENCOUNTER
Spoke to patient and  informed them that the last dose of her Lovenox should be taken tomorrow 6/12 before 7 am. Both verified understanding.

## 2024-06-11 NOTE — TELEPHONE ENCOUNTER
----- Message from Charla Hawthorne MD sent at 6/10/2024  1:40 PM CDT -----  Thank you   Ray, please let her know to take the last Lovenox dose the day before surgery , prior to 7 AM  ----- Message -----  From: Juan Ramon León DO  Sent: 6/10/2024   1:35 PM CDT  To: Charla Hawthorne MD    Let's say 7am day before surgery just to be safe.  ----- Message -----  From: Charla Hawthorne MD  Sent: 6/7/2024   5:57 PM CDT  To: DEVYN Truong; Juan Ramon León DO; #    Just followed up   Spoke to her   Doing good   Last took warfarin on the 5th of June  INR 2.4 Yesterday   INR 1.6 today- started Lovenox today 12 noon  To Stay on the  Lovenox until June 12th   Jean Claude I you okay with the Lovenox to be taken the day before surgery at 12 noon-if not I can ask her to take it at 7:00 a.m. the day before surgery  Doing well  No bleeding problems  ----- Message -----  From: Viky Villavicencio MD  Sent: 6/4/2024  12:31 PM CDT  To: Charla Hawthorne MD; #    I think elevated PTT is cause she was taking coumadin no?    No need for ivc filter, just bridge with lovenox per guidelines and resume coumadin after procedure per surgeon discretion after wound has healed and no high risk of bleeding      Everything else sounds good    Thank you for taking care of her  ----- Message -----  From: Charla Hawthorne MD  Sent: 6/4/2024  12:27 PM CDT  To: DEVYN Truong; Juan Ramon León DO; #    Team     Spoke to her today     Perioperative anticoagulation plan    Date of surgery 6/13   Planned Anesthesia- General  Anticoagulated with Warfarin   Plan for holding Warfarin  with bridging lovenox   She does home INR testing - once a week   On Warfarin 2.5 mg once a day for about 1 month  INR usually 2.7- 2.2- 2.4    Discussed that usually warfarin is held 5-7 days prior to surgery   Given that surgery is Neuro surgery - will hold 7 days pre op   Last day pf pre op use of Warfarin 6/5 - usually takes at  Lunch time   Suggested monitoring INR daily starting 6/6 and start Lovenox when INR is less than 2  Creatinine clearance 80   Lovenox 40 mg sub cutaneous once a day in the morning time ,once INR is less than 2  Last day of Lovenx use is the AM of 6/12 prior to 7 AM  Sharp safety discussed   E prescribed to [facility]      APTT elevated 5/31  INR was 2.7 on 5/30   No coagulation factor deficiency, liver problem   Dr Chicas - I wonder of the cause of elevated APTT is warfarin use   She is known to have Vitamin K deficiency-  with regards to the cause of low vitamin K - not on Vitamin E, Not on antibiotic, Liver problems , malabsorption . I am unsure if she has Low Vitamin K as she does not have the usual causes for low Vitamin K   What do you think ?    Discussed that she does need IVC filter  ----- Message -----  From: Viky Villavicencio MD  Sent: 6/3/2024  11:44 AM CDT  To: Charla Hawthorne MD; #    No need for IVC filter     Prophylactic dose of lovenox should be ok, continue per guidelines after orthopedic or neurosurgical intervention    Resume coumadin after procedure per surgeon discretion  ----- Message -----  From: Charla Hawthorne MD  Sent: 6/3/2024  11:31 AM CDT  To: DEVYN Truong; Juan Ramon León DO; #    Thank you   Do you think she needs bridging with prophylactic dose Lovenox or full dose lovenox   Given that we are deciding to bridge with lovenox, we probably do not need IVC filter - what do you think ?  ----- Message -----  From: Viky Villavicencio MD  Sent: 6/2/2024   7:44 AM CDT  To: Charla Hawthorne MD; #    And Bridge with lovenox  ----- Message -----  From: Charla Hawthorne MD  Sent: 5/31/2024   6:29 PM CDT  To: DEVYN Truong; Juan Ramon León DO; #    Dr Chicas     I am working on the perioperative care of this nice lady who is heading for a Neuro surgery    She is known to you     Appreciate your help with her perioperative  anticoagulation plan   She is on Coumadin     From what I see in your note   -06/2022 hypercoagulable state work including FV Leiden mutation, PGM, Antiphospholipid syndrome antibodies, antithrombin III deficiency, PNH profile, Francisco 2 mutation and MPN panel all unremarkable   -06/2022 Protein s/c deficiency noted but that can be caused while on coumadin      Based on what I gathered , I feel that her thrombotic episodes were provoked - Birth control pill use   She has a history of thrombosis in the family on the maternal side of the family     I gather that IVC filter is being considered for her   I am also considering Bridging lovenox     Based on what I see in your note, her hypercoagulable seem  unremarkable     Do you feel that it is safe to hold Warfarin for surgery -  If you feel that it is safe to hold Warfarin for surgery- will let her proceed     If on the other hand you feel that it is not safe to to hold Warfarin for surgery- then will work with the surgeon about bridging Lovenox versus retrievable IVC filter     Please let me know your thoughts    Thank you     Agustin

## 2024-06-12 ENCOUNTER — ANESTHESIA EVENT (OUTPATIENT)
Dept: SURGERY | Facility: HOSPITAL | Age: 54
DRG: 026 | End: 2024-06-12
Payer: COMMERCIAL

## 2024-06-12 ENCOUNTER — PATIENT MESSAGE (OUTPATIENT)
Dept: NEUROSURGERY | Facility: CLINIC | Age: 54
End: 2024-06-12
Payer: COMMERCIAL

## 2024-06-12 NOTE — ANESTHESIA PREPROCEDURE EVALUATION
[facility]  Anesthesia Pre-Operative Evaluation         Patient Name: Elva Burrougsh  YOB: 1970  MRN: [id number]    SUBJECTIVE:     Pre-operative evaluation for Procedure(s) (LRB):  DECOMPRESSION, CHIARI MALFORMATION, BY 1ST CERVICAL VERTEBRA POSTERIOR ARCH REMOVAL (Bilateral)     06/13/2024    Elva Burroughs is a 53 y.o. female with a significant medical history of well controlled HTN, multiple TIAs without residual neuro deficits, JARAD with use of CPAP, absence seizures, LLE DVT on warfarin (transitioned to lovenox prior for current surgery) and chiari 1 malformation,who presents for the above procedure.    LDA: None documented.       Prev airway: None documented.    Drips: None documented.   sodium chloride 0.9%   Intravenous Continuous         TTE: 8/2023   Global left ventricular systolic function is normal. The left ventricular ejection fraction is 60%. Mild to moderate (1-2+) tricuspid regurgitation. Trace mitral regurgitation.  The pulmonary artery systolic pressure is 33 mmHg.     Patient Active Problem List   Diagnosis    Chronic deep vein thrombosis (DVT) of popliteal vein of left lower extremity    Essential hypertension    Chronic tension-type headache, not intractable    Chronic anticoagulation    Obstructive sleep apnea (adult) (pediatric)    BMI 28.0-28.9,adult    Hx of cerebral infarction    Chiari malformation type I    Hypercoagulable state    Encounter to discuss treatment options    Lymphadenopathy    Elevated serum globulin level    Iron deficiency anemia secondary to inadequate dietary iron intake    Encounter for education    Multiple thyroid nodules    Absence seizure    Vision loss    Other cerebral infarction    Dizziness    Radiologically isolated syndrome    Syrinx of spinal cord    History of COVID-19    Long term (current) use of antithrombotics/antiplatelets    Acid reflux    History of menorrhagia    Edema    Altered mental status        Review of patient's allergies indicates:   Allergen Reactions    Pcn [penicillins] Other (See Comments)     unknown       Current Inpatient Medications:   mupirocin  1 g Nasal BID       No current facility-administered medications on file prior to encounter.     Current Outpatient Medications on File Prior to Encounter   Medication Sig Dispense Refill    ascorbic acid, vitamin C, (VITAMIN C) 500 MG tablet Take 500 mg by mouth once daily.      aspirin (ECOTRIN) 81 MG EC tablet Take 1 tablet (81 mg total) by mouth once daily. 150 tablet 2    famotidine (PEPCID) 40 MG tablet Take 1 tablet (40 mg total) by mouth once daily. 30 tablet 5    ferrous sulfate (IRON) 325 mg (65 mg iron) Tab tablet Take 325 mg by mouth once daily.      NIFEdipine (PROCARDIA-XL) 60 MG (OSM) 24 hr tablet Take 1 tablet (60 mg total) by mouth once daily. 90 tablet 3    lancets (COAGUCHEK LANCETS) Misc 1 each by Misc.(Non-Drug; Combo Route) route once a week. 100 each 0    losartan (COZAAR) 25 MG tablet Take 1 tablet (25 mg total) by mouth once daily. (Patient not taking: Reported on 6/3/2024) 90 tablet 3    prothrombin time/INR test metr Misc 1 Device by Misc.(Non-Drug; Combo Route) route once a week. 1 each 0       Past Surgical History:   Procedure Laterality Date    PULMONARY EMBOLISM SURGERY      TUBAL LIGATION         Social History     Socioeconomic History    Marital status:    Tobacco Use    Smoking status: Never     Passive exposure: Never    Smokeless tobacco: Never   Substance and Sexual Activity    Alcohol use: No    Drug use: No    Sexual activity: Yes     Partners: Male     Social Determinants of Health     Financial Resource Strain: Medium Risk (4/4/2024)    Overall Financial Resource Strain (CARDIA)     Difficulty of Paying Living Expenses: Somewhat hard   Food Insecurity: Food Insecurity Present (4/4/2024)    Hunger Vital Sign     Worried About Running Out of Food in the Last Year: Sometimes true     Ran Out of Food  "in the Last Year: Never true   Transportation Needs: No Transportation Needs (4/4/2024)    PRAPARE - Transportation     Lack of Transportation (Medical): No     Lack of Transportation (Non-Medical): No   Physical Activity: Unknown (4/4/2024)    Exercise Vital Sign     Minutes of Exercise per Session: 20 min   Stress: Stress Concern Present (4/4/2024)    [facility] Occupational [facility] - Occupational Stress Questionnaire     Feeling of Stress : To some extent   Housing Stability: Low Risk  (4/4/2024)    Housing Stability Vital Sign     Unable to Pay for Housing in the Last Year: No     Number of Places Lived in the Last Year: 1     Unstable Housing in the Last Year: No       OBJECTIVE:     Vital Signs Range:      5/31/2024     9:39 AM 6/3/2024     9:42 AM 6/13/2024     5:47 AM   Vitals - 1 value per visit   SYSTOLIC 123 127 133   DIASTOLIC 75 86 77   Pulse 92 94 84   Temp   36.2 °C (97.2 °F)   Resp 16  18   SPO2   98 %   Weight (lb) 184 185.3 185.41   Weight (kg) 83.462 84.05 84.1   Height 5' 7" (1.702 m) 5' 7" (1.702 m) 5' 7" (1.702 m)   BMI (Calculated) 28.8 29 29   Pain Score  Zero          CBC:   Lab Results   Component Value Date    WBC 4.92 06/13/2024    HGB 13.9 06/13/2024    HCT 43.5 06/13/2024    MCV 85 06/13/2024     06/13/2024         CMP:   Sodium   Date Value Ref Range Status   06/13/2024 140 136 - 145 mmol/L Final     Potassium   Date Value Ref Range Status   06/13/2024 3.3 (L) 3.5 - 5.1 mmol/L Final     Chloride   Date Value Ref Range Status   06/13/2024 105 95 - 110 mmol/L Final     CO2   Date Value Ref Range Status   06/13/2024 25 23 - 29 mmol/L Final     Glucose   Date Value Ref Range Status   06/13/2024 106 70 - 110 mg/dL Final     BUN   Date Value Ref Range Status   06/13/2024 7 6 - 20 mg/dL Final     Creatinine   Date Value Ref Range Status   06/13/2024 0.8 0.5 - 1.4 mg/dL Final     Calcium   Date Value Ref Range Status   06/13/2024 9.3 8.7 - 10.5 mg/dL Final     Total Protein "   Date Value Ref Range Status   09/19/2023 8.4 (H) 6.3 - 8.2 g/dL Final     Albumin   Date Value Ref Range Status   09/19/2023 4.1 3.5 - 5.2 g/dL Final     Total Bilirubin   Date Value Ref Range Status   09/19/2023 0.5 0.2 - 1.3 mg/dL Final     Alkaline Phosphatase   Date Value Ref Range Status   09/19/2023 83 38 - 145 U/L Final     AST   Date Value Ref Range Status   09/19/2023 29 14 - 36 U/L Final     ALT   Date Value Ref Range Status   09/19/2023 19 10 - 44 U/L Final     Anion Gap   Date Value Ref Range Status   06/13/2024 10 8 - 16 mmol/L Final     eGFR if    Date Value Ref Range Status   07/11/2022 >60 >60 mL/min/1.73 m^2 Final     eGFR if non    Date Value Ref Range Status   07/11/2022 >60 >60 mL/min/1.73 m^2 Final     Comment:     Calculation used to obtain the estimated glomerular filtration  rate (eGFR) is the CKD-EPI equation.          INR:  Lab Results   Component Value Date    INR 1.0 06/13/2024    INR 1.7 (H) 02/28/2024    INR 1.5 (A) 01/29/2024    PROTIME 18.2 (A) 01/29/2024    PROTIME 21.1 (A) 09/23/2022    PROTIME 28.4 (A) 05/09/2022       EKG:   Results for orders placed or performed during the hospital encounter of 05/31/24   EKG 12-lead    Collection Time: 05/31/24 12:31 PM   Result Value Ref Range    QRS Duration 80 ms    OHS QTC Calculation 426 ms    Narrative    Test Reason : Z01.818,    Vent. Rate : 065 BPM     Atrial Rate : 065 BPM     P-R Int : 110 ms          QRS Dur : 080 ms      QT Int : 410 ms       P-R-T Axes : 041 057 040 degrees     QTc Int : 426 ms    Sinus rhythm with short LA  Nonspecific ST and/or T wave abnormalities  Abnormal ECG  When compared with ECG of 09-JAN-2023 11:15,  No significant change was found  Confirmed by Carole Gilbert MD ([id number]) on 5/31/2024 2:14:17 PM    Referred By: RHONDA LEOPOLD           Confirmed By:Carole Gilbert MD          ASSESSMENT/PLAN:           Pre-op Assessment    I have reviewed the Patient Summary Reports.     I have  reviewed the Nursing Notes. I have reviewed the NPO Status.   I have reviewed the Medications.     Review of Systems  Anesthesia Hx:  No previous Anesthesia   Neg history of prior surgery.          Denies Family Hx of Anesthesia complications.    Denies Personal Hx of Anesthesia complications.                    Social:  Non-Smoker, No Alcohol Use       Hematology/Oncology:       -- Anemia:               Hematology Comments: DVT (on warfarin chronically, transitioned to lovenox prior to surgery)                    Cardiovascular:     Hypertension, well controlled    Denies CAD.    Denies CABG/stent.                                     Pulmonary:    Denies COPD.  Denies Asthma.    Sleep Apnea, CPAP                Renal/:   Denies Chronic Renal Disease.                Hepatic/GI:     GERD             Neurological:  TIA,    Headaches Seizures (absence)                                Endocrine:  Endocrine Normal                Physical Exam  General: Well nourished, Cooperative and Alert    Airway:  Mallampati: II / II  Mouth Opening: Normal  TM Distance: Normal  Tongue: Normal  Neck ROM: Normal ROM    Dental:  Periodontal disease    Chest/Lungs:  Normal Respiratory Rate    Heart:  Rate: Normal  Rhythm: Regular Rhythm  Sounds: Normal        Anesthesia Plan  Type of Anesthesia, risks & benefits discussed:    Anesthesia Type: Gen ETT  Intra-op Monitoring Plan: Standard ASA Monitors and Art Line  Post Op Pain Control Plan: multimodal analgesia  Induction:  IV  Airway Plan: Direct, Post-Induction  Informed Consent: Informed consent signed with the Patient and all parties understand the risks and agree with anesthesia plan.  All questions answered. Patient consented to blood products? Yes  ASA Score: 3  Day of Surgery Review of History & Physical: H&P Update referred to the surgeon/provider.    Ready For Surgery From Anesthesia Perspective.     .

## 2024-06-12 NOTE — TELEPHONE ENCOUNTER
Called and spoke to pt's  and gave arrival time and instructions for the night before/morning of surgery. Answered all questions and portal message sent with details.

## 2024-06-13 ENCOUNTER — HOSPITAL ENCOUNTER (INPATIENT)
Facility: HOSPITAL | Age: 54
LOS: 2 days | Discharge: HOME | DRG: 026 | End: 2024-06-15
Attending: NEUROLOGICAL SURGERY | Admitting: NEUROLOGICAL SURGERY
Payer: COMMERCIAL

## 2024-06-13 ENCOUNTER — ANESTHESIA (OUTPATIENT)
Dept: SURGERY | Facility: HOSPITAL | Age: 54
DRG: 026 | End: 2024-06-13
Payer: COMMERCIAL

## 2024-06-13 DIAGNOSIS — G93.5 CHIARI MALFORMATION TYPE I: ICD-10-CM

## 2024-06-13 DIAGNOSIS — G93.5 CHIARI I MALFORMATION: Primary | ICD-10-CM

## 2024-06-13 LAB
ABO + RH BLD: NORMAL
ANION GAP SERPL CALC-SCNC: 10 MMOL/L (ref 8–16)
APTT PPP: 25.4 SEC (ref 21–32)
BASOPHILS # BLD AUTO: 0.02 K/UL (ref 0–0.2)
BASOPHILS NFR BLD: 0.4 % (ref 0–1.9)
BILIRUB UR QL STRIP: NEGATIVE
BLD GP AB SCN CELLS X3 SERPL QL: NORMAL
BUN SERPL-MCNC: 7 MG/DL (ref 6–20)
CALCIUM SERPL-MCNC: 9.3 MG/DL (ref 8.7–10.5)
CHLORIDE SERPL-SCNC: 105 MMOL/L (ref 95–110)
CLARITY UR REFRACT.AUTO: CLEAR
CO2 SERPL-SCNC: 25 MMOL/L (ref 23–29)
COLOR UR AUTO: COLORLESS
CREAT SERPL-MCNC: 0.8 MG/DL (ref 0.5–1.4)
DIFFERENTIAL METHOD BLD: NORMAL
EOSINOPHIL # BLD AUTO: 0.1 K/UL (ref 0–0.5)
EOSINOPHIL NFR BLD: 1.6 % (ref 0–8)
ERYTHROCYTE [DISTWIDTH] IN BLOOD BY AUTOMATED COUNT: 14.1 % (ref 11.5–14.5)
EST. GFR  (NO RACE VARIABLE): >60 ML/MIN/1.73 M^2
GLUCOSE SERPL-MCNC: 106 MG/DL (ref 70–110)
GLUCOSE UR QL STRIP: NEGATIVE
HCT VFR BLD AUTO: 43.5 % (ref 37–48.5)
HGB BLD-MCNC: 13.9 G/DL (ref 12–16)
HGB UR QL STRIP: NEGATIVE
IMM GRANULOCYTES # BLD AUTO: 0.01 K/UL (ref 0–0.04)
IMM GRANULOCYTES NFR BLD AUTO: 0.2 % (ref 0–0.5)
INR PPP: 1 (ref 0.8–1.2)
KETONES UR QL STRIP: NEGATIVE
LEUKOCYTE ESTERASE UR QL STRIP: NEGATIVE
LYMPHOCYTES # BLD AUTO: 2.1 K/UL (ref 1–4.8)
LYMPHOCYTES NFR BLD: 41.9 % (ref 18–48)
MCH RBC QN AUTO: 27.3 PG (ref 27–31)
MCHC RBC AUTO-ENTMCNC: 32 G/DL (ref 32–36)
MCV RBC AUTO: 85 FL (ref 82–98)
MONOCYTES # BLD AUTO: 0.4 K/UL (ref 0.3–1)
MONOCYTES NFR BLD: 7.5 % (ref 4–15)
NEUTROPHILS # BLD AUTO: 2.4 K/UL (ref 1.8–7.7)
NEUTROPHILS NFR BLD: 48.4 % (ref 38–73)
NITRITE UR QL STRIP: NEGATIVE
NRBC BLD-RTO: 0 /100 WBC
PH UR STRIP: 7 [PH] (ref 5–8)
PLATELET # BLD AUTO: 305 K/UL (ref 150–450)
PMV BLD AUTO: 9.3 FL (ref 9.2–12.9)
POTASSIUM SERPL-SCNC: 3.3 MMOL/L (ref 3.5–5.1)
PROT UR QL STRIP: NEGATIVE
PROTHROMBIN TIME: 10.7 SEC (ref 9–12.5)
RBC # BLD AUTO: 5.1 M/UL (ref 4–5.4)
SODIUM SERPL-SCNC: 140 MMOL/L (ref 136–145)
SP GR UR STRIP: 1.01 (ref 1–1.03)
SPECIMEN OUTDATE: NORMAL
URN SPEC COLLECT METH UR: ABNORMAL
WBC # BLD AUTO: 4.92 K/UL (ref 3.9–12.7)

## 2024-06-13 PROCEDURE — 86850 RBC ANTIBODY SCREEN: CPT | Performed by: STUDENT IN AN ORGANIZED HEALTH CARE EDUCATION/TRAINING PROGRAM

## 2024-06-13 PROCEDURE — 86900 BLOOD TYPING SEROLOGIC ABO: CPT | Performed by: STUDENT IN AN ORGANIZED HEALTH CARE EDUCATION/TRAINING PROGRAM

## 2024-06-13 PROCEDURE — 81003 URINALYSIS AUTO W/O SCOPE: CPT | Performed by: REGISTERED NURSE

## 2024-06-13 PROCEDURE — 36000711: Performed by: NEUROLOGICAL SURGERY

## 2024-06-13 PROCEDURE — 61343 CRNEC SOPL CRV LAM DCMPRN: CPT | Mod: ,,, | Performed by: NEUROLOGICAL SURGERY

## 2024-06-13 PROCEDURE — 27201423 OPTIME MED/SURG SUP & DEVICES STERILE SUPPLY: Performed by: NEUROLOGICAL SURGERY

## 2024-06-13 PROCEDURE — 63600175 PHARM REV CODE 636 W HCPCS

## 2024-06-13 PROCEDURE — 85025 COMPLETE CBC W/AUTO DIFF WBC: CPT | Performed by: STUDENT IN AN ORGANIZED HEALTH CARE EDUCATION/TRAINING PROGRAM

## 2024-06-13 PROCEDURE — C1713 ANCHOR/SCREW BN/BN,TIS/BN: HCPCS | Performed by: NEUROLOGICAL SURGERY

## 2024-06-13 PROCEDURE — 25000003 PHARM REV CODE 250: Performed by: NEUROLOGICAL SURGERY

## 2024-06-13 PROCEDURE — 25000003 PHARM REV CODE 250: Performed by: STUDENT IN AN ORGANIZED HEALTH CARE EDUCATION/TRAINING PROGRAM

## 2024-06-13 PROCEDURE — 85610 PROTHROMBIN TIME: CPT | Performed by: STUDENT IN AN ORGANIZED HEALTH CARE EDUCATION/TRAINING PROGRAM

## 2024-06-13 PROCEDURE — 25000003 PHARM REV CODE 250: Performed by: REGISTERED NURSE

## 2024-06-13 PROCEDURE — 25000003 PHARM REV CODE 250

## 2024-06-13 PROCEDURE — 94761 N-INVAS EAR/PLS OXIMETRY MLT: CPT

## 2024-06-13 PROCEDURE — 005C0ZZ DESTRUCTION OF CEREBELLUM, OPEN APPROACH: ICD-10-PCS | Performed by: NEUROLOGICAL SURGERY

## 2024-06-13 PROCEDURE — 00U207Z SUPPLEMENT DURA MATER WITH AUTOLOGOUS TISSUE SUBSTITUTE, OPEN APPROACH: ICD-10-PCS | Performed by: NEUROLOGICAL SURGERY

## 2024-06-13 PROCEDURE — 85730 THROMBOPLASTIN TIME PARTIAL: CPT | Performed by: STUDENT IN AN ORGANIZED HEALTH CARE EDUCATION/TRAINING PROGRAM

## 2024-06-13 PROCEDURE — 99291 CRITICAL CARE FIRST HOUR: CPT | Mod: ,,, | Performed by: REGISTERED NURSE

## 2024-06-13 PROCEDURE — 37000009 HC ANESTHESIA EA ADD 15 MINS: Performed by: NEUROLOGICAL SURGERY

## 2024-06-13 PROCEDURE — 20000000 HC ICU ROOM

## 2024-06-13 PROCEDURE — 36000710: Performed by: NEUROLOGICAL SURGERY

## 2024-06-13 PROCEDURE — 80048 BASIC METABOLIC PNL TOTAL CA: CPT | Performed by: STUDENT IN AN ORGANIZED HEALTH CARE EDUCATION/TRAINING PROGRAM

## 2024-06-13 PROCEDURE — 37000008 HC ANESTHESIA 1ST 15 MINUTES: Performed by: NEUROLOGICAL SURGERY

## 2024-06-13 PROCEDURE — 63600175 PHARM REV CODE 636 W HCPCS: Performed by: NEUROLOGICAL SURGERY

## 2024-06-13 PROCEDURE — 20900 REMOVAL OF BONE FOR GRAFT: CPT | Mod: 51,,, | Performed by: NEUROLOGICAL SURGERY

## 2024-06-13 PROCEDURE — 63600175 PHARM REV CODE 636 W HCPCS: Performed by: STUDENT IN AN ORGANIZED HEALTH CARE EDUCATION/TRAINING PROGRAM

## 2024-06-13 PROCEDURE — 00NC0ZZ RELEASE CEREBELLUM, OPEN APPROACH: ICD-10-PCS | Performed by: NEUROLOGICAL SURGERY

## 2024-06-13 DEVICE — PINS SKULL ADULT MAYFIELD: Type: IMPLANTABLE DEVICE | Site: CRANIAL | Status: FUNCTIONAL

## 2024-06-13 RX ORDER — HYDROMORPHONE HYDROCHLORIDE 1 MG/ML
0.2 INJECTION, SOLUTION INTRAMUSCULAR; INTRAVENOUS; SUBCUTANEOUS EVERY 5 MIN PRN
OUTPATIENT
Start: 2024-06-13

## 2024-06-13 RX ORDER — PROCHLORPERAZINE EDISYLATE 5 MG/ML
5 INJECTION INTRAMUSCULAR; INTRAVENOUS EVERY 30 MIN PRN
OUTPATIENT
Start: 2024-06-13

## 2024-06-13 RX ORDER — ONDANSETRON HYDROCHLORIDE 2 MG/ML
8 INJECTION, SOLUTION INTRAVENOUS EVERY 6 HOURS PRN
Status: DISCONTINUED | OUTPATIENT
Start: 2024-06-13 | End: 2024-06-15 | Stop reason: HOSPADM

## 2024-06-13 RX ORDER — LOSARTAN POTASSIUM 25 MG/1
25 TABLET ORAL DAILY
Status: DISCONTINUED | OUTPATIENT
Start: 2024-06-13 | End: 2024-06-15 | Stop reason: HOSPADM

## 2024-06-13 RX ORDER — DEXAMETHASONE SODIUM PHOSPHATE 4 MG/ML
INJECTION, SOLUTION INTRA-ARTICULAR; INTRALESIONAL; INTRAMUSCULAR; INTRAVENOUS; SOFT TISSUE
Status: DISCONTINUED | OUTPATIENT
Start: 2024-06-13 | End: 2024-06-13

## 2024-06-13 RX ORDER — AMOXICILLIN 250 MG
1 CAPSULE ORAL 2 TIMES DAILY
Status: DISCONTINUED | OUTPATIENT
Start: 2024-06-13 | End: 2024-06-15 | Stop reason: HOSPADM

## 2024-06-13 RX ORDER — SODIUM,POTASSIUM PHOSPHATES 280-250MG
2 POWDER IN PACKET (EA) ORAL
Status: DISCONTINUED | OUTPATIENT
Start: 2024-06-13 | End: 2024-06-15 | Stop reason: HOSPADM

## 2024-06-13 RX ORDER — METHOCARBAMOL 750 MG/1
750 TABLET, FILM COATED ORAL 4 TIMES DAILY
Status: DISCONTINUED | OUTPATIENT
Start: 2024-06-13 | End: 2024-06-15 | Stop reason: HOSPADM

## 2024-06-13 RX ORDER — POLYETHYLENE GLYCOL 3350 17 G/17G
17 POWDER, FOR SOLUTION ORAL DAILY
Status: DISCONTINUED | OUTPATIENT
Start: 2024-06-13 | End: 2024-06-15 | Stop reason: HOSPADM

## 2024-06-13 RX ORDER — MORPHINE SULFATE 2 MG/ML
2 INJECTION, SOLUTION INTRAMUSCULAR; INTRAVENOUS EVERY 4 HOURS PRN
Status: DISCONTINUED | OUTPATIENT
Start: 2024-06-13 | End: 2024-06-15 | Stop reason: HOSPADM

## 2024-06-13 RX ORDER — SUCCINYLCHOLINE CHLORIDE 20 MG/ML
INJECTION INTRAMUSCULAR; INTRAVENOUS
Status: DISCONTINUED | OUTPATIENT
Start: 2024-06-13 | End: 2024-06-13

## 2024-06-13 RX ORDER — SODIUM CHLORIDE 0.9 % (FLUSH) 0.9 %
10 SYRINGE (ML) INJECTION
Status: DISCONTINUED | OUTPATIENT
Start: 2024-06-13 | End: 2024-06-15 | Stop reason: HOSPADM

## 2024-06-13 RX ORDER — PHENYLEPHRINE HCL IN 0.9% NACL 1 MG/10 ML
SYRINGE (ML) INTRAVENOUS
Status: DISCONTINUED | OUTPATIENT
Start: 2024-06-13 | End: 2024-06-13

## 2024-06-13 RX ORDER — LANOLIN ALCOHOL/MO/W.PET/CERES
800 CREAM (GRAM) TOPICAL
Status: DISCONTINUED | OUTPATIENT
Start: 2024-06-13 | End: 2024-06-15 | Stop reason: HOSPADM

## 2024-06-13 RX ORDER — NICARDIPINE HYDROCHLORIDE 0.2 MG/ML
0-15 INJECTION INTRAVENOUS CONTINUOUS
Status: DISCONTINUED | OUTPATIENT
Start: 2024-06-13 | End: 2024-06-14

## 2024-06-13 RX ORDER — SODIUM CHLORIDE 0.9 % (FLUSH) 0.9 %
10 SYRINGE (ML) INJECTION
OUTPATIENT
Start: 2024-06-13

## 2024-06-13 RX ORDER — LIDOCAINE HYDROCHLORIDE 20 MG/ML
INJECTION, SOLUTION EPIDURAL; INFILTRATION; INTRACAUDAL; PERINEURAL
Status: DISCONTINUED | OUTPATIENT
Start: 2024-06-13 | End: 2024-06-13

## 2024-06-13 RX ORDER — MUPIROCIN 20 MG/G
OINTMENT TOPICAL
Status: DISCONTINUED | OUTPATIENT
Start: 2024-06-13 | End: 2024-06-13 | Stop reason: HOSPADM

## 2024-06-13 RX ORDER — NIFEDIPINE 30 MG/1
60 TABLET, EXTENDED RELEASE ORAL DAILY
Status: DISCONTINUED | OUTPATIENT
Start: 2024-06-14 | End: 2024-06-15 | Stop reason: HOSPADM

## 2024-06-13 RX ORDER — SODIUM CHLORIDE 9 MG/ML
INJECTION, SOLUTION INTRAVENOUS CONTINUOUS
Status: DISCONTINUED | OUTPATIENT
Start: 2024-06-13 | End: 2024-06-13

## 2024-06-13 RX ORDER — BACITRACIN 500 [USP'U]/G
OINTMENT TOPICAL
Status: DISCONTINUED | OUTPATIENT
Start: 2024-06-13 | End: 2024-06-13 | Stop reason: HOSPADM

## 2024-06-13 RX ORDER — FENTANYL CITRATE 50 UG/ML
INJECTION, SOLUTION INTRAMUSCULAR; INTRAVENOUS
Status: DISCONTINUED | OUTPATIENT
Start: 2024-06-13 | End: 2024-06-13

## 2024-06-13 RX ORDER — HYDRALAZINE HYDROCHLORIDE 20 MG/ML
10 INJECTION INTRAMUSCULAR; INTRAVENOUS EVERY 4 HOURS PRN
Status: DISCONTINUED | OUTPATIENT
Start: 2024-06-13 | End: 2024-06-15 | Stop reason: HOSPADM

## 2024-06-13 RX ORDER — LABETALOL HYDROCHLORIDE 5 MG/ML
10 INJECTION, SOLUTION INTRAVENOUS EVERY 4 HOURS PRN
Status: DISCONTINUED | OUTPATIENT
Start: 2024-06-13 | End: 2024-06-14

## 2024-06-13 RX ORDER — MIDAZOLAM HYDROCHLORIDE 1 MG/ML
INJECTION INTRAMUSCULAR; INTRAVENOUS
Status: DISCONTINUED | OUTPATIENT
Start: 2024-06-13 | End: 2024-06-13

## 2024-06-13 RX ORDER — HEPARIN SODIUM 5000 [USP'U]/ML
5000 INJECTION, SOLUTION INTRAVENOUS; SUBCUTANEOUS EVERY 8 HOURS
Status: DISCONTINUED | OUTPATIENT
Start: 2024-06-14 | End: 2024-06-15 | Stop reason: HOSPADM

## 2024-06-13 RX ORDER — PROPOFOL 10 MG/ML
VIAL (ML) INTRAVENOUS
Status: DISCONTINUED | OUTPATIENT
Start: 2024-06-13 | End: 2024-06-13

## 2024-06-13 RX ORDER — FAMOTIDINE 20 MG/1
20 TABLET, FILM COATED ORAL 2 TIMES DAILY
Status: DISCONTINUED | OUTPATIENT
Start: 2024-06-13 | End: 2024-06-15 | Stop reason: HOSPADM

## 2024-06-13 RX ORDER — MUPIROCIN 20 MG/G
1 OINTMENT TOPICAL 2 TIMES DAILY
Status: DISCONTINUED | OUTPATIENT
Start: 2024-06-13 | End: 2024-06-13 | Stop reason: HOSPADM

## 2024-06-13 RX ORDER — ACETAMINOPHEN 500 MG
1000 TABLET ORAL EVERY 6 HOURS
Status: DISCONTINUED | OUTPATIENT
Start: 2024-06-13 | End: 2024-06-15 | Stop reason: HOSPADM

## 2024-06-13 RX ORDER — CEFTRIAXONE 1 G/1
INJECTION, POWDER, FOR SOLUTION INTRAMUSCULAR; INTRAVENOUS
Status: DISCONTINUED | OUTPATIENT
Start: 2024-06-13 | End: 2024-06-13 | Stop reason: HOSPADM

## 2024-06-13 RX ORDER — DEXAMETHASONE SODIUM PHOSPHATE 4 MG/ML
4 INJECTION, SOLUTION INTRA-ARTICULAR; INTRALESIONAL; INTRAMUSCULAR; INTRAVENOUS; SOFT TISSUE EVERY 6 HOURS
Status: DISCONTINUED | OUTPATIENT
Start: 2024-06-13 | End: 2024-06-15 | Stop reason: HOSPADM

## 2024-06-13 RX ORDER — LIDOCAINE HYDROCHLORIDE AND EPINEPHRINE 10; 10 MG/ML; UG/ML
INJECTION, SOLUTION INFILTRATION; PERINEURAL
Status: DISCONTINUED | OUTPATIENT
Start: 2024-06-13 | End: 2024-06-13 | Stop reason: HOSPADM

## 2024-06-13 RX ORDER — ONDANSETRON HYDROCHLORIDE 2 MG/ML
INJECTION, SOLUTION INTRAVENOUS
Status: DISCONTINUED | OUTPATIENT
Start: 2024-06-13 | End: 2024-06-13

## 2024-06-13 RX ORDER — MUPIROCIN 20 MG/G
OINTMENT TOPICAL 2 TIMES DAILY
Status: DISCONTINUED | OUTPATIENT
Start: 2024-06-13 | End: 2024-06-15 | Stop reason: HOSPADM

## 2024-06-13 RX ADMIN — NICARDIPINE HYDROCHLORIDE 15 MG/HR: 0.2 INJECTION, SOLUTION INTRAVENOUS at 04:06

## 2024-06-13 RX ADMIN — SUCCINYLCHOLINE CHLORIDE 100 MG: 20 INJECTION, SOLUTION INTRAMUSCULAR; INTRAVENOUS; PARENTERAL at 07:06

## 2024-06-13 RX ADMIN — LOSARTAN POTASSIUM 25 MG: 25 TABLET, FILM COATED ORAL at 12:06

## 2024-06-13 RX ADMIN — METHOCARBAMOL 750 MG: 750 TABLET ORAL at 08:06

## 2024-06-13 RX ADMIN — DEXAMETHASONE SODIUM PHOSPHATE 4 MG: 4 INJECTION INTRA-ARTICULAR; INTRALESIONAL; INTRAMUSCULAR; INTRAVENOUS; SOFT TISSUE at 06:06

## 2024-06-13 RX ADMIN — PROPOFOL 20 MG: 10 INJECTION, EMULSION INTRAVENOUS at 07:06

## 2024-06-13 RX ADMIN — PROPOFOL 30 MG: 10 INJECTION, EMULSION INTRAVENOUS at 10:06

## 2024-06-13 RX ADMIN — Medication 200 MCG: at 07:06

## 2024-06-13 RX ADMIN — SODIUM CHLORIDE: 0.9 INJECTION, SOLUTION INTRAVENOUS at 11:06

## 2024-06-13 RX ADMIN — METHOCARBAMOL 750 MG: 750 TABLET ORAL at 04:06

## 2024-06-13 RX ADMIN — ONDANSETRON 4 MG: 2 INJECTION INTRAMUSCULAR; INTRAVENOUS at 10:06

## 2024-06-13 RX ADMIN — SENNOSIDES AND DOCUSATE SODIUM 1 TABLET: 50; 8.6 TABLET ORAL at 08:06

## 2024-06-13 RX ADMIN — DEXAMETHASONE SODIUM PHOSPHATE 12 MG: 4 INJECTION INTRA-ARTICULAR; INTRALESIONAL; INTRAMUSCULAR; INTRAVENOUS; SOFT TISSUE at 08:06

## 2024-06-13 RX ADMIN — LIDOCAINE HYDROCHLORIDE 80 MG: 20 INJECTION, SOLUTION EPIDURAL; INFILTRATION; INTRACAUDAL at 07:06

## 2024-06-13 RX ADMIN — PROPOFOL 10 MG: 10 INJECTION, EMULSION INTRAVENOUS at 07:06

## 2024-06-13 RX ADMIN — CEFAZOLIN 2 G: 2 INJECTION, POWDER, FOR SOLUTION INTRAMUSCULAR; INTRAVENOUS at 11:06

## 2024-06-13 RX ADMIN — DEXAMETHASONE SODIUM PHOSPHATE 4 MG: 4 INJECTION INTRA-ARTICULAR; INTRALESIONAL; INTRAMUSCULAR; INTRAVENOUS; SOFT TISSUE at 12:06

## 2024-06-13 RX ADMIN — PROPOFOL 50 MG: 10 INJECTION, EMULSION INTRAVENOUS at 08:06

## 2024-06-13 RX ADMIN — MUPIROCIN: 20 OINTMENT TOPICAL at 08:06

## 2024-06-13 RX ADMIN — SODIUM CHLORIDE, SODIUM GLUCONATE, SODIUM ACETATE, POTASSIUM CHLORIDE, MAGNESIUM CHLORIDE, SODIUM PHOSPHATE, DIBASIC, AND POTASSIUM PHOSPHATE: .53; .5; .37; .037; .03; .012; .00082 INJECTION, SOLUTION INTRAVENOUS at 07:06

## 2024-06-13 RX ADMIN — PROPOFOL 100 MG: 10 INJECTION, EMULSION INTRAVENOUS at 07:06

## 2024-06-13 RX ADMIN — PHENYLEPHRINE HYDROCHLORIDE 0.5 MCG/KG/MIN: 10 INJECTION INTRAVENOUS at 07:06

## 2024-06-13 RX ADMIN — NICARDIPINE HYDROCHLORIDE 10 MG/HR: 0.2 INJECTION, SOLUTION INTRAVENOUS at 01:06

## 2024-06-13 RX ADMIN — CEFAZOLIN 2 G: 2 INJECTION, POWDER, FOR SOLUTION INTRAMUSCULAR; INTRAVENOUS at 04:06

## 2024-06-13 RX ADMIN — CEFAZOLIN 2 G: 2 INJECTION, POWDER, FOR SOLUTION INTRAMUSCULAR; INTRAVENOUS at 07:06

## 2024-06-13 RX ADMIN — FAMOTIDINE 20 MG: 20 TABLET ORAL at 12:06

## 2024-06-13 RX ADMIN — FENTANYL CITRATE 25 MCG: 50 INJECTION INTRAMUSCULAR; INTRAVENOUS at 07:06

## 2024-06-13 RX ADMIN — SODIUM CHLORIDE 0.2 MCG/KG/MIN: 9 INJECTION, SOLUTION INTRAVENOUS at 07:06

## 2024-06-13 RX ADMIN — MUPIROCIN: 20 OINTMENT TOPICAL at 12:06

## 2024-06-13 RX ADMIN — Medication 100 MCG: at 10:06

## 2024-06-13 RX ADMIN — ACETAMINOPHEN 1000 MG: 500 TABLET ORAL at 11:06

## 2024-06-13 RX ADMIN — FAMOTIDINE 20 MG: 20 TABLET ORAL at 08:06

## 2024-06-13 RX ADMIN — NICARDIPINE HYDROCHLORIDE 2.5 MG/HR: 0.2 INJECTION, SOLUTION INTRAVENOUS at 11:06

## 2024-06-13 RX ADMIN — ACETAMINOPHEN 1000 MG: 500 TABLET ORAL at 06:06

## 2024-06-13 RX ADMIN — DEXAMETHASONE SODIUM PHOSPHATE 4 MG: 4 INJECTION INTRA-ARTICULAR; INTRALESIONAL; INTRAMUSCULAR; INTRAVENOUS; SOFT TISSUE at 11:06

## 2024-06-13 RX ADMIN — MUPIROCIN: 20 OINTMENT TOPICAL at 05:06

## 2024-06-13 RX ADMIN — FENTANYL CITRATE 100 MCG: 50 INJECTION INTRAMUSCULAR; INTRAVENOUS at 07:06

## 2024-06-13 RX ADMIN — MIDAZOLAM 2 MG: 1 INJECTION INTRAMUSCULAR; INTRAVENOUS at 07:06

## 2024-06-13 RX ADMIN — ACETAMINOPHEN 1000 MG: 500 TABLET ORAL at 12:06

## 2024-06-13 RX ADMIN — METHOCARBAMOL 750 MG: 750 TABLET ORAL at 12:06

## 2024-06-13 NOTE — PLAN OF CARE
"[facility] Care Plan    POC reviewed with Montanaroxie GEMA Burroughs and family. Pt verbalized understanding. Questions and concerns addressed. No acute events today. Pt progressing toward goals. Will continue to monitor. See below and flowsheets for full assessment and VS info.     - Nicardipine gtt continued. Currently infusing at 12.5 mg/hr. Okay to use cuff pressures for systolic goal <140  - Vasquez passed. Diet initiated.  - Post-op chris in place    Is this a stroke patient? yes- Stroke booklet reviewed with patient and family, risk factors identified for patient and stroke booklet remains at bedside for ongoing education.     Care individualization: goal tolerate weaning off cardene for BP parameter goals <140, remain neuro intact    Neuro:  [place] Coma Scale  Best Eye Response: 4-->(E4) spontaneous  Best Motor Response: 6-->(M6) obeys commands  Best Verbal Response: 5-->(V5) oriented  [place] Coma Scale Score: 15  Pupil PERRLA: yes     24 hr Temp:  [96.8 °F (36 °C)-97.2 °F (36.2 °C)]     CV:   Rhythm: sinus tachycardia  BP goals:   SBP < 140  MAP > 65    Resp:           Plan: N/A    GI/:        Last Bowel Movement: 06/12/24       Intake/Output Summary (Last 24 hours) at 6/13/2024 1357  Last data filed at 6/13/2024 1037  Gross per 24 hour   Intake 1110 ml   Output 250 ml   Net 860 ml          Labs/Accuchecks:  Recent Labs   Lab 06/13/24  0544   WBC 4.92   RBC 5.10   HGB 13.9   HCT 43.5         Recent Labs   Lab 06/13/24  0544      K 3.3*   CO2 25      BUN 7   CREATININE 0.8      Recent Labs   Lab 06/13/24  0544   INR 1.0   APTT 25.4    No results for input(s): "CPK", "CPKMB", "TROPONINI", "MB" in the last 168 hours.    Electrolytes: N/A - electrolytes WDL  Accuchecks: none    Gtts:   sodium chloride 0.9%   Intravenous Continuous 100 mL/hr at 06/13/24 1145 New Bag at 06/13/24 1145    nicardipine  0-15 mg/hr Intravenous Continuous 50 mL/hr at 06/13/24 1355 10 mg/hr at 06/13/24 1355 "       LDA/Wounds:      Nurses Note -- 4 Eyes      Is there altered skin present? no       Prevention Measures Documented    Second RN/Staff Member:  ROSENDA Sutherland      Restraints: N/A       WCTM

## 2024-06-13 NOTE — HPI
54 y/o F w/ PMH of HTN, RIS, prior strokes/TIA, DVTs anticoagulated on coumadin, and anemia who presents following Chiari decompression w/ Dr. León. Per chart review, patient has a longstanding history of headaches localized in the suboccipital and upper cervical area that have progressively worsened over the last year and intensify w/ Valsalva type maneuvers. She was also experiencing gait imbalance, dizziness, and blurred vision. On MRI brain and c-spine, she was found to have a Chiari I malformation w/ tonsillar descent of approximately 14mm causing crowding of the foramen magnum and an associated cervical syrinx. She is now s/p suboccipital craniectomy for chiari decompression and C1 laminectomy. She will be admitted to [facility] for post-op monitoring and further management.

## 2024-06-13 NOTE — ANESTHESIA PROCEDURE NOTES
Intubation    Date/Time: 6/13/2024 7:24 AM    Performed by: Jovani Echols MD  Authorized by: Jimenez Victoria MD    Intubation:     Induction:  Intravenous    Intubated:  Postinduction    Mask Ventilation:  Easy with oral airway    Attempts:  1    Attempted By:  Resident anesthesiologist    Method of Intubation:  Direct    Blade:  [place] 2    Laryngeal View Grade: Grade I - full view of cords      Difficult Airway Encountered?: No      Complications:  None    Airway Device:  Oral endotracheal tube    Airway Device Size:  7.0    Style/Cuff Inflation:  Cuffed (inflated to minimal occlusive pressure)    Tube secured:  23    Secured at:  The lips    Placement Verified By:  Capnometry and Revisualization with laryngoscopy    Complicating Factors:  None    Findings Post-Intubation:  BS equal bilateral and atraumatic/condition of teeth unchanged

## 2024-06-13 NOTE — NURSING
Patient arrived to [facility] from [place] to room 9096    Report received from: PACU RN, MD Yuliya    Type of stroke/diagnosis: Chiari malformation      Current symptoms: anesthesia induced lethargy    Skin Assessment done: Yes    Wounds noted: pin sites on head, post neck incision     *If wounds noted, was Wound Care consulted? Y/N  *If wounds noted, LDA placed? Y/N  Skin Assessment Verified by: ROSENDA Vargas Completed? Will complete when appropriate    Patient Belongings on Admit: none     NCC notified: PRINCESS Godfrey, GREGORIO Peralta NP at the bedside

## 2024-06-13 NOTE — TRANSFER OF CARE
"Anesthesia Transfer of Care Note    Patient: Elva Burroughs    Procedure(s) Performed: Procedure(s) (LRB):  DECOMPRESSION, CHIARI MALFORMATION, BY 1ST CERVICAL VERTEBRA POSTERIOR ARCH REMOVAL (Bilateral)    Patient location: ICU    Anesthesia Type: general    Transport from OR: Transported from OR on 6-10 L/min O2 by face mask with adequate spontaneous ventilation. Continuous ECG monitoring in transport. Continuous SpO2 monitoring in transport. Continuos invasive BP monitoring in transport. Upon arrival to PACU/ICU, patient attached to 100% O2 by T-piece with adequate spontaneous ventilation    Post pain: adequate analgesia    Post assessment: no apparent anesthetic complications    Post vital signs: stable    Level of consciousness: awake and alert    Nausea/Vomiting: no nausea/vomiting    Complications: none    Transfer of care protocol was followed      Last vitals: Visit Vitals  /77 (BP Location: Right arm)   Pulse 84   Temp 36.2 °C (97.2 °F) (Skin)   Resp 18   Ht 5' 7" (1.702 m)   Wt 84.1 kg (185 lb 6.5 oz)   LMP 02/12/2023 (Approximate)   SpO2 98%   Breastfeeding No   BMI 29.04 kg/m²     "

## 2024-06-13 NOTE — BRIEF OP NOTE
[facility] - Surgery ([facility])  Brief Operative Note    SUMMARY     Surgery Date: 6/13/2024     Surgeons and Role:     * Juan Ramon León, DO - Primary    Assisting Surgeon: Beto Dwyer MD    Pre-op Diagnosis:  Chiari malformation type I [G93.5]    Post-op Diagnosis:  Post-Op Diagnosis Codes:     * Chiari malformation type I [G93.5]    Procedure(s) (LRB):  DECOMPRESSION, CHIARI MALFORMATION, BY 1ST CERVICAL VERTEBRA POSTERIOR ARCH REMOVAL (Bilateral)    Anesthesia: General    Implants:  Implant Name Type Inv. Item Serial No.  Lot No. LRB No. Used Action   PINS SKULL ADULT DIETRICH - [id number]  PINS SKULL ADULT [place]  [employer] [id number] N/A 1 Implanted       Operative Findings: Suboccipital craniectomy + C1 laminectomy with autologous expansile duraplasty     Estimated Blood Loss: 100cc         Specimens:   Specimen (24h ago, onward)      None            [id number]

## 2024-06-13 NOTE — OP NOTE
DATE OF PROCEDURE:  6/13/24     PREOPERATIVE DIAGNOSES:  1.  Chiari I malformation.     POSTOPERATIVE DIAGNOSES:  1.  Chiari I malformation.    PROCEDURES PERFORMED:  1.  Suboccipital craniectomy for chiari decompression.  2.  C1 laminectomy.  3.  Pericranial graft harvest from separate incision  4.  Duraplasty with autologous pericranial graft.  5.  Use of intraoperative microscope with microscopic dissection  6.  Use of intraoperative neuromonitoring (MEP, SSEP)     ATTENDING:  Juan Ramon León D.O.     FIRST ASSISTANT:  Chris Dwyer M.D.     INDICATIONS FOR SURGERY:  A 53-year-old F with progressively worsening   daily and constant headaches localized to the suboccipital and upper cervical   area. She also endorses that her headaches worsen with   Valsalva type maneuvers such as coughing or going to the bathroom or having a   bowel movement rather.  She was found to have a Chiari I   malformation with descent of the tonsils into the upper cervical region   below C1.  A decision was made to take the patient for a suboccipital Chiari decompression   and C1 laminectomy.     PROCEDURE IN DETAIL:  The patient was correctly identified, taken to the   Operating Room where the Anesthesia team administered general endotracheal   anesthesia.  The patient's head was fixed in a Dolan head frame and turned   into the prone position over gel rolls.  The head was secured to the bed with   the Dolan head frame.  The head was placed in a slightly tucked flexed   position.  Patient was taped securely to the bed and   its midline suboccipital incision was planned from the inion down to C1.  A   strip of hair was clipped using the electric clippers.  Patient was prepped and   draped in the typical sterile fashion.  The patient was given 10 mg of Decadron.    Next, a timeout was performed.  The incision was infiltrated with local   anesthetic and then opened with a #10 blade scalpel followed by dissection down   to the suboccipital  bone using Bovie cautery.  The incision was carried down to   the C1 lamina and the superior aspect of C2.  Self-retaining retractors were   placed for greater exposure.  The suboccipital musculature was dissected out   laterally and the posterior ring of C1 was dissected out laterally.  Next,   craniectomy was performed using a 6-mm cutting erika.  A trough was drilled until   the dura was identified.  The bone was then removed   revealing the underlying dura.  Next, a C1 laminectomy was performed using the   craniotome foot plate.  This revealed the various dural layers.  Next, a midline   incision was made from the top of the craniotomy down to C1.  This exposed the   underlying Chiari malformation.  CSF was then drained to decompress the area.    Tack-up sutures were placed to hold the dura open.      Next, using bipolar cautery, the tonsils were then cauterized causing them to shrink up.  This was done until the choroid plexus in the foramen of Magendie was identified and pulsatile CSF was identified.       A pericranial graft was harvested from just superior to the top of the incision and craniotomy   site.  This graft was then cleaned and cut to shape and then sewn into place   within the dura using running 4-0 Prolene.  Next, hemostasis was obtained with   bipolar cautery as well as FloSeal and Gelfoam.  Valsalva maneuver was   performed, which did not demonstrate any spinal fluid leakage.  DuraSeal was   then sprayed over the duraplasty and then the wound was closed in layered   fashion, first with 0 Vicryls in the muscle and fascial layer followed by 2-0   Vicryls in the deep dermal layer followed by staples on the skin.     COMPLICATIONS:  None.     ESTIMATED BLOOD LOSS:  100 mL.     WOUND:  Midline suboccipital.     WOUND CLASSIFICATION:  Clean.     DRAINS:  None.     CONDITION:  Stable.     PROGNOSIS:  Good.

## 2024-06-13 NOTE — ANESTHESIA PROCEDURE NOTES
Arterial    Diagnosis: chiari malformation    Patient location during procedure: done in OR    Staffing  Authorizing Provider: Jimenez Victoria MD  Performing Provider: Jovani Echols MD    Staffing  Performed by: Jovani Echols MD  Authorized by: Jimenez Victoria MD    Anesthesiologist was present at the time of the procedure.    Preanesthetic Checklist  Completed: patient identified, IV checked, site marked, risks and benefits discussed, surgical consent, monitors and equipment checked, pre-op evaluation, timeout performed and anesthesia consent givenArterial  Skin Prep: chlorhexidine gluconate and isopropyl alcohol  Local Infiltration: none  Orientation: left  Location: radial    Catheter Size: 20 G  Catheter placement by Ultrasound guidance. Heme positive aspiration all ports.   Vessel Caliber: medium, patent, compressibility normal  Vascular Doppler:  not done  Needle advanced into vessel with real time Ultrasound guidance.Insertion Attempts: 1  Assessment  Dressing: secured with tape and tegaderm  Patient: Tolerated well

## 2024-06-13 NOTE — SUBJECTIVE & OBJECTIVE
Past Medical History:   Diagnosis Date    Anemia     Anticoagulated on Coumadin     Deep vein thrombosis     DVT (deep venous thrombosis)     GERD (gastroesophageal reflux disease)     Headache     Hypertension     Pulmonary embolism      Past Surgical History:   Procedure Laterality Date    PULMONARY EMBOLISM SURGERY      TUBAL LIGATION        No current facility-administered medications on file prior to encounter.     Current Outpatient Medications on File Prior to Encounter   Medication Sig Dispense Refill    ascorbic acid, vitamin C, (VITAMIN C) 500 MG tablet Take 500 mg by mouth once daily.      aspirin (ECOTRIN) 81 MG EC tablet Take 1 tablet (81 mg total) by mouth once daily. 150 tablet 2    famotidine (PEPCID) 40 MG tablet Take 1 tablet (40 mg total) by mouth once daily. 30 tablet 5    ferrous sulfate (IRON) 325 mg (65 mg iron) Tab tablet Take 325 mg by mouth once daily.      NIFEdipine (PROCARDIA-XL) 60 MG (OSM) 24 hr tablet Take 1 tablet (60 mg total) by mouth once daily. 90 tablet 3    lancets (COAGUCHEK LANCETS) Misc 1 each by Misc.(Non-Drug; Combo Route) route once a week. 100 each 0    losartan (COZAAR) 25 MG tablet Take 1 tablet (25 mg total) by mouth once daily. (Patient not taking: Reported on 6/3/2024) 90 tablet 3    prothrombin time/INR test metr Misc 1 Device by Misc.(Non-Drug; Combo Route) route once a week. 1 each 0      Allergies: Pcn [penicillins]  Family History   Problem Relation Name Age of Onset    Arthritis Mother      Cancer Mother      Depression Mother      Early death Mother      Heart disease Mother      Hypertension Mother      Mental illness Mother      Miscarriages / Stillbirths Mother       Social History     Tobacco Use    Smoking status: Never     Passive exposure: Never    Smokeless tobacco: Never   Substance Use Topics    Alcohol use: No    Drug use: No     Review of Systems   Respiratory:  Negative for cough and shortness of breath.    Cardiovascular:  Negative for chest  pain.   Gastrointestinal:  Negative for abdominal pain, nausea and vomiting.   Musculoskeletal:  Positive for neck stiffness. Negative for neck pain.   Neurological:  Positive for weakness (BLE) and headaches. Negative for speech difficulty and numbness.     Objective:     Vitals:    Temp: 98 °F (36.7 °C)  Pulse: (!) 120  Rhythm: sinus tachycardia  BP: 124/67  MAP (mmHg): 89  Resp: 14  SpO2: 98 %    Temp  Min: 96.8 °F (36 °C)  Max: 98 °F (36.7 °C)  Pulse  Min: 84  Max: 124  BP  Min: 117/55  Max: 150/94  MAP (mmHg)  Min: 80  Max: 117  Resp  Min: 11  Max: 22  SpO2  Min: 96 %  Max: 100 %    No intake/output data recorded.            Physical Exam  Vitals and nursing note reviewed.   Constitutional:       General: She is not in acute distress.     Appearance: Normal appearance.   HENT:      Head: Normocephalic.   Eyes:      Extraocular Movements: Extraocular movements intact.      Pupils: Pupils are equal, round, and reactive to light.   Neck:      Comments: Neck stiffness following procedure; no tenderness  Cardiovascular:      Rate and Rhythm: Regular rhythm. Tachycardia present.      Pulses: Normal pulses.   Pulmonary:      Effort: Pulmonary effort is normal.   Abdominal:      Palpations: Abdomen is soft.   Musculoskeletal:         General: Normal range of motion.   Skin:     General: Skin is warm and dry.      Capillary Refill: Capillary refill takes less than 2 seconds.   Neurological:      Mental Status: She is alert and oriented to person, place, and time.      GCS: GCS eye subscore is 4. GCS verbal subscore is 5. GCS motor subscore is 6.      Cranial Nerves: Cranial nerves 2-12 are intact.      Sensory: Sensation is intact.      Motor: Weakness present.      Comments:   -E4 V5 M6  -PERRL  -Oriented to person, place, time, and situation  -Follows commands w/ all extremities  -MARINELLI spontaneously/purposefully  -RUE 5/5  -LUE 5/5  -RLE 4/5  -LLE 4/5            Today I personally reviewed pertinent medications,  lines/drains/airways, cardiology results, laboratory results, notably: CBC; CMP; U/A

## 2024-06-13 NOTE — ASSESSMENT & PLAN NOTE
-2/2 hx of DVTs; 1st in 1992 which progressed to PE, 2nd around 1999/2000  -Home warfarin dose - 2.5mg daily  -Warfarin bridged to lovenox prior to procedure  -Continue to hold warfarin per NSGY  -Ok for SQH in AM   Patient, Dom Guzman (MRN #[id number]), presented with a recent Estimated Glumerular Filtration Rate (EGFR) between 30 and 45 consistent with the definition of chronic kidney disease stage 3 - moderate (ICD10 - N18.3).    eGFR if non    Date Value Ref Range Status   06/10/2020 35.0 (A) >60 mL/min/1.73 m^2 Final     Comment:     Calculation used to obtain the estimated glomerular filtration  rate (eGFR) is the CKD-EPI equation.          The patient's chronic kidney disease stage 3 was monitored, evaluated, addressed and/or treated. This addendum to the medical record is made on 06/17/2020.

## 2024-06-13 NOTE — PLAN OF CARE
Problem: Adult Inpatient Plan of Care  Goal: Plan of Care Review  Outcome: Progressing  Goal: Patient-Specific Goal (Individualized)  6/13/2024 1401 by Marla Borrero RN  Outcome: Progressing  6/13/2024 1356 by Marla Borrero RN  Outcome: Progressing  Goal: Absence of Hospital-Acquired Illness or Injury  Outcome: Progressing  Goal: Optimal Comfort and Wellbeing  6/13/2024 1401 by Marla Borrero RN  Outcome: Progressing  6/13/2024 1356 by Marla Borrero RN  Outcome: Progressing  Goal: Readiness for Transition of Care  Outcome: Progressing     Problem: Infection  Goal: Absence of Infection Signs and Symptoms  6/13/2024 1401 by Marla Borrero RN  Outcome: Met  6/13/2024 1356 by Marla Borrero RN  Outcome: Progressing

## 2024-06-14 LAB
ALBUMIN SERPL BCP-MCNC: 2.9 G/DL (ref 3.5–5.2)
ALP SERPL-CCNC: 71 U/L (ref 55–135)
ALT SERPL W/O P-5'-P-CCNC: 19 U/L (ref 10–44)
ANION GAP SERPL CALC-SCNC: 11 MMOL/L (ref 8–16)
AST SERPL-CCNC: 25 U/L (ref 10–40)
BASOPHILS # BLD AUTO: 0.01 K/UL (ref 0–0.2)
BASOPHILS NFR BLD: 0.1 % (ref 0–1.9)
BILIRUB SERPL-MCNC: 0.2 MG/DL (ref 0.1–1)
BUN SERPL-MCNC: 7 MG/DL (ref 6–20)
CALCIUM SERPL-MCNC: 8.8 MG/DL (ref 8.7–10.5)
CHLORIDE SERPL-SCNC: 106 MMOL/L (ref 95–110)
CO2 SERPL-SCNC: 20 MMOL/L (ref 23–29)
CREAT SERPL-MCNC: 0.8 MG/DL (ref 0.5–1.4)
DIFFERENTIAL METHOD BLD: ABNORMAL
EOSINOPHIL # BLD AUTO: 0 K/UL (ref 0–0.5)
EOSINOPHIL NFR BLD: 0 % (ref 0–8)
ERYTHROCYTE [DISTWIDTH] IN BLOOD BY AUTOMATED COUNT: 14.1 % (ref 11.5–14.5)
EST. GFR  (NO RACE VARIABLE): >60 ML/MIN/1.73 M^2
GLUCOSE SERPL-MCNC: 135 MG/DL (ref 70–110)
HCT VFR BLD AUTO: 39.2 % (ref 37–48.5)
HGB BLD-MCNC: 12.4 G/DL (ref 12–16)
IMM GRANULOCYTES # BLD AUTO: 0.07 K/UL (ref 0–0.04)
IMM GRANULOCYTES NFR BLD AUTO: 0.5 % (ref 0–0.5)
LYMPHOCYTES # BLD AUTO: 0.9 K/UL (ref 1–4.8)
LYMPHOCYTES NFR BLD: 5.9 % (ref 18–48)
MAGNESIUM SERPL-MCNC: 1.8 MG/DL (ref 1.6–2.6)
MCH RBC QN AUTO: 27.3 PG (ref 27–31)
MCHC RBC AUTO-ENTMCNC: 31.6 G/DL (ref 32–36)
MCV RBC AUTO: 86 FL (ref 82–98)
MONOCYTES # BLD AUTO: 0.3 K/UL (ref 0.3–1)
MONOCYTES NFR BLD: 2.2 % (ref 4–15)
NEUTROPHILS # BLD AUTO: 13.6 K/UL (ref 1.8–7.7)
NEUTROPHILS NFR BLD: 91.3 % (ref 38–73)
NRBC BLD-RTO: 0 /100 WBC
PHOSPHATE SERPL-MCNC: 2.5 MG/DL (ref 2.7–4.5)
PLATELET # BLD AUTO: 253 K/UL (ref 150–450)
PMV BLD AUTO: 9.1 FL (ref 9.2–12.9)
POTASSIUM SERPL-SCNC: 3.5 MMOL/L (ref 3.5–5.1)
PROT SERPL-MCNC: 7 G/DL (ref 6–8.4)
RBC # BLD AUTO: 4.55 M/UL (ref 4–5.4)
SODIUM SERPL-SCNC: 137 MMOL/L (ref 136–145)
WBC # BLD AUTO: 14.85 K/UL (ref 3.9–12.7)

## 2024-06-14 PROCEDURE — 63600175 PHARM REV CODE 636 W HCPCS: Performed by: STUDENT IN AN ORGANIZED HEALTH CARE EDUCATION/TRAINING PROGRAM

## 2024-06-14 PROCEDURE — 25000003 PHARM REV CODE 250: Performed by: STUDENT IN AN ORGANIZED HEALTH CARE EDUCATION/TRAINING PROGRAM

## 2024-06-14 PROCEDURE — 99233 SBSQ HOSP IP/OBS HIGH 50: CPT | Mod: ,,, | Performed by: PHYSICIAN ASSISTANT

## 2024-06-14 PROCEDURE — 94761 N-INVAS EAR/PLS OXIMETRY MLT: CPT

## 2024-06-14 PROCEDURE — 25000003 PHARM REV CODE 250: Performed by: REGISTERED NURSE

## 2024-06-14 PROCEDURE — 84100 ASSAY OF PHOSPHORUS: CPT | Performed by: REGISTERED NURSE

## 2024-06-14 PROCEDURE — 85025 COMPLETE CBC W/AUTO DIFF WBC: CPT

## 2024-06-14 PROCEDURE — 80053 COMPREHEN METABOLIC PANEL: CPT | Performed by: REGISTERED NURSE

## 2024-06-14 PROCEDURE — 11000001 HC ACUTE MED/SURG PRIVATE ROOM

## 2024-06-14 PROCEDURE — 83735 ASSAY OF MAGNESIUM: CPT | Performed by: REGISTERED NURSE

## 2024-06-14 RX ORDER — LABETALOL HCL 20 MG/4 ML
10 SYRINGE (ML) INTRAVENOUS EVERY 4 HOURS PRN
Status: DISCONTINUED | OUTPATIENT
Start: 2024-06-14 | End: 2024-06-15 | Stop reason: HOSPADM

## 2024-06-14 RX ADMIN — METHOCARBAMOL 750 MG: 750 TABLET ORAL at 09:06

## 2024-06-14 RX ADMIN — METHOCARBAMOL 750 MG: 750 TABLET ORAL at 05:06

## 2024-06-14 RX ADMIN — SENNOSIDES AND DOCUSATE SODIUM 1 TABLET: 50; 8.6 TABLET ORAL at 08:06

## 2024-06-14 RX ADMIN — Medication 800 MG: at 01:06

## 2024-06-14 RX ADMIN — HEPARIN SODIUM 5000 UNITS: 5000 INJECTION INTRAVENOUS; SUBCUTANEOUS at 06:06

## 2024-06-14 RX ADMIN — HEPARIN SODIUM 5000 UNITS: 5000 INJECTION INTRAVENOUS; SUBCUTANEOUS at 01:06

## 2024-06-14 RX ADMIN — MUPIROCIN: 20 OINTMENT TOPICAL at 09:06

## 2024-06-14 RX ADMIN — NIFEDIPINE 60 MG: 30 TABLET, FILM COATED, EXTENDED RELEASE ORAL at 09:06

## 2024-06-14 RX ADMIN — MUPIROCIN: 20 OINTMENT TOPICAL at 08:06

## 2024-06-14 RX ADMIN — FAMOTIDINE 20 MG: 20 TABLET ORAL at 08:06

## 2024-06-14 RX ADMIN — DEXAMETHASONE SODIUM PHOSPHATE 4 MG: 4 INJECTION INTRA-ARTICULAR; INTRALESIONAL; INTRAMUSCULAR; INTRAVENOUS; SOFT TISSUE at 06:06

## 2024-06-14 RX ADMIN — POTASSIUM BICARBONATE 50 MEQ: 978 TABLET, EFFERVESCENT ORAL at 01:06

## 2024-06-14 RX ADMIN — HEPARIN SODIUM 5000 UNITS: 5000 INJECTION INTRAVENOUS; SUBCUTANEOUS at 09:06

## 2024-06-14 RX ADMIN — DEXAMETHASONE SODIUM PHOSPHATE 4 MG: 4 INJECTION INTRA-ARTICULAR; INTRALESIONAL; INTRAMUSCULAR; INTRAVENOUS; SOFT TISSUE at 05:06

## 2024-06-14 RX ADMIN — DEXAMETHASONE SODIUM PHOSPHATE 4 MG: 4 INJECTION INTRA-ARTICULAR; INTRALESIONAL; INTRAMUSCULAR; INTRAVENOUS; SOFT TISSUE at 11:06

## 2024-06-14 RX ADMIN — METHOCARBAMOL 750 MG: 750 TABLET ORAL at 08:06

## 2024-06-14 RX ADMIN — ACETAMINOPHEN 1000 MG: 500 TABLET ORAL at 05:06

## 2024-06-14 RX ADMIN — FAMOTIDINE 20 MG: 20 TABLET ORAL at 09:06

## 2024-06-14 RX ADMIN — ACETAMINOPHEN 1000 MG: 500 TABLET ORAL at 11:06

## 2024-06-14 RX ADMIN — POTASSIUM & SODIUM PHOSPHATES POWDER PACK 280-160-250 MG 2 PACKET: 280-160-250 PACK at 01:06

## 2024-06-14 RX ADMIN — ACETAMINOPHEN 1000 MG: 500 TABLET ORAL at 01:06

## 2024-06-14 RX ADMIN — Medication 800 MG: at 06:06

## 2024-06-14 RX ADMIN — POTASSIUM & SODIUM PHOSPHATES POWDER PACK 280-160-250 MG 2 PACKET: 280-160-250 PACK at 06:06

## 2024-06-14 RX ADMIN — METHOCARBAMOL 750 MG: 750 TABLET ORAL at 01:06

## 2024-06-14 RX ADMIN — LOSARTAN POTASSIUM 25 MG: 25 TABLET, FILM COATED ORAL at 09:06

## 2024-06-14 RX ADMIN — DEXAMETHASONE SODIUM PHOSPHATE 4 MG: 4 INJECTION INTRA-ARTICULAR; INTRALESIONAL; INTRAMUSCULAR; INTRAVENOUS; SOFT TISSUE at 01:06

## 2024-06-14 NOTE — PLAN OF CARE
"[facility] Care Plan    POC reviewed with Elva Burroughs and spouse at 0305. Pt verbalized understanding. Questions and concerns addressed. No acute events overnight. Pt progressing toward goals. Will continue to monitor. See below and flowsheets for full assessment and VS info.     K+ (3.5), Mg (1.8), & phos (2.5) replaced.   SBP maintained <140 w/o intervention.      Is this a stroke patient? no    Neuro:  Johnston Coma Scale  Best Eye Response: 4-->(E4) spontaneous  Best Motor Response: 6-->(M6) obeys commands  Best Verbal Response: 5-->(V5) oriented  Johnston Coma Scale Score: 15  Assessment Qualifiers: patient not sedated/intubated, no eye obstruction present  Pupil PERRLA: yes     24hr Temp:  [96.8 °F (36 °C)-98 °F (36.7 °C)]     CV:   Rhythm: sinus tachycardia  BP goals:   SBP < 140  MAP > 65    Resp:           Plan: N/A    GI/:     Diet/Nutrition Received: regular  Last Bowel Movement: 06/12/24  Voiding Characteristics: urethral catheter (bladder)    Intake/Output Summary (Last 24 hours) at 6/14/2024 0307  Last data filed at 6/14/2024 0302  Gross per 24 hour   Intake 2944.42 ml   Output 2260 ml   Net 684.42 ml     Unmeasured Output  Stool Occurrence: 0    Labs/Accuchecks:  Recent Labs   Lab 06/14/24  0120   WBC 14.85*   RBC 4.55   HGB 12.4   HCT 39.2         Recent Labs   Lab 06/14/24  0011      K 3.5   CO2 20*      BUN 7   CREATININE 0.8   ALKPHOS 71   ALT 19   AST 25   BILITOT 0.2      Recent Labs   Lab 06/13/24  0544   INR 1.0   APTT 25.4    No results for input(s): "CPK", "CPKMB", "TROPONINI", "MB" in the last 168 hours.    Electrolytes: Electrolytes replaced  Accuchecks: none    Gtts:   nicardipine  0-15 mg/hr Intravenous Continuous   Stopped at 06/13/24 1849       LDA/Wounds:    Nurses Note -- 4 Eyes    Is there altered skin present? no       Prevention Measures Documented    Second RN/Staff Member:  ROSENDA Adan       Problem: Adult Inpatient Plan of Care  Goal: Plan of " Care Review  Outcome: Progressing  Goal: Optimal Comfort and Wellbeing  Outcome: Progressing     Problem: Skin Injury Risk Increased  Goal: Skin Health and Integrity  Outcome: Progressing

## 2024-06-14 NOTE — ASSESSMENT & PLAN NOTE
-2/2 hx of DVTs; 1st in 1992 which progressed to PE, 2nd around 1999/2000  -Home warfarin dose - 2.5mg daily  -Warfarin bridged to lovenox prior to procedure  -Continue to hold warfarin per NSGY  -Ok for SQH in AM

## 2024-06-14 NOTE — PROGRESS NOTES
[facility]  Neurosurgery  Progress Note    Subjective:     History of Present Illness: Elva Burroughs is a 53 y.o.  female with below PMH including RIS and prior strokes/TIA for which she is on warfarin and ASA, who is referred to me by Dr Sutherland for evaluation of chiari malformation.  She reports headaches that are frontal and occipital in location.  Her headaches used to be daily but now occur from every other week to 1-2 times per week.  She finds that Tylenol and aspirin have been helpful.  She reports that coughing and bending over intensify her headaches.  She does not think that her headaches are positional however she finds that resting or lying down will help her headaches.     She reports imbalance and gait disturbance.     She also reports dizziness and blurry vision.  She has been evaluated by Ophthalmology who reports that she has 2020 vision and does not have any papilledema.  The source of her blurry vision remains unclear.       Whenever her recent MRIs demonstrated features of possible intracranial hypotension however she has no confirmed evidence of a CSF leak or any spinal trauma from which a leak could have resulted.  She did faint when she had her stroke but never has injured her spin    Post-Op Info:  Procedure(s) (LRB):  DECOMPRESSION, CHIARI MALFORMATION, BY 1ST CERVICAL VERTEBRA POSTERIOR ARCH REMOVAL (Bilateral)   1 Day Post-Op   Interval History: NAEON. Denies headache. No nausea/emesis overnight. Awaiting PT/OT. Ok for TTF this AM. Continue to hold Coumadin/therapeutic LVX.    Medications:  Continuous Infusions:   nicardipine  0-15 mg/hr Intravenous Continuous   Stopped at 06/13/24 5544     Scheduled Meds:   acetaminophen  1,000 mg Oral Q6H    dexAMETHasone  4 mg Intravenous Q6H    famotidine  20 mg Oral BID    heparin (porcine)  5,000 Units Subcutaneous Q8H    losartan  25 mg Oral Daily    methocarbamoL  750 mg Oral QID    mupirocin   Nasal BID    NIFEdipine  60 mg  Oral Daily    polyethylene glycol  17 g Oral Daily    senna-docusate 8.6-50 mg  1 tablet Oral BID     PRN Meds:  Current Facility-Administered Medications:     hydrALAZINE, 10 mg, Intravenous, Q4H PRN    labetalol, 10 mg, Intravenous, Q4H PRN    magnesium oxide, 800 mg, Oral, PRN    magnesium oxide, 800 mg, Oral, PRN    morphine, 2 mg, Intravenous, Q4H PRN    ondansetron, 8 mg, Intravenous, Q6H PRN    potassium bicarbonate, 35 mEq, Oral, PRN    potassium bicarbonate, 50 mEq, Oral, PRN    potassium bicarbonate, 60 mEq, Oral, PRN    potassium, sodium phosphates, 2 packet, Oral, PRN    potassium, sodium phosphates, 2 packet, Oral, PRN    potassium, sodium phosphates, 2 packet, Oral, PRN    sodium chloride 0.9%, 10 mL, Intravenous, PRN     Review of Systems  Objective:     Weight: 84.1 kg (185 lb 6.5 oz)  Body mass index is 29.04 kg/m².  Vital Signs (Most Recent):  Temp: 97.6 °F (36.4 °C) (06/14/24 0701)  Pulse: 96 (06/14/24 0701)  Resp: 13 (06/14/24 0701)  BP: 113/73 (06/14/24 0701)  SpO2: 96 % (06/14/24 0701) Vital Signs (24h Range):  Temp:  [96.8 °F (36 °C)-98 °F (36.7 °C)] 97.6 °F (36.4 °C)  Pulse:  [] 96  Resp:  [11-27] 13  SpO2:  [95 %-100 %] 96 %  BP: (113-150)/(55-97) 113/73                         Female External Urinary Catheter w/ Suction 06/14/24 0630 (Active)   Skin no redness;no breakdown 06/14/24 0701   Tolerance no signs/symptoms of discomfort 06/14/24 0701   Suction Continuous suction at 60 mmHg 06/14/24 0701          Physical Exam         Neurosurgery Physical Exam  General: no distress  Head: Non-traumatic, normocephalic  Eyes: Pupils equal, EOMi  Neck: Supple, normal ROM, no tenderness to palpation  CVS: Normal rate and rhythm, distal pulses present  Pulm: Symmetric expansion, no respiratory distress  GI: Abdomen nondistended, nontender  MSK: Moves all extremities without restriction, atraumatic  Skin: Dressing c/d/i  Psych: Normal thought content and cognition  Neuro: AOx3, GCS  E4V5M6  CNII-XII: Intact on fine exam, PERRL, EOMi, facial sensation preserved, no facial asymmetry, tongue/uvula/palate midline, shoulder shrug equal, No pronator drift  Extremities:  Motor:  Upper Extremity:                                  Deltoids        Triceps        Biceps        WE        WF                Interosseous           R        5/5              5/5              5/5            5/5         5/5         5/5                5/5           L        5/5              5/5              5/5            5/5         5/5         5/5                5/5  Lower Extremity:                                      HF        KE        KF        DF        PF        EHL           R       5/5        5/5        5/5        5/5        5/5        5/5           L       5/5        5/5        5/5        5/5        5/5        5/5    Reflexes:     DTR: 2+ and symmetrically throughout    Sensory:      Sensorium intact throughout, no sensory level present    Coordination:      Coordination intact throughout      Significant Labs:  Recent Labs   Lab 06/13/24  0544 06/14/24  0011    135*    137   K 3.3* 3.5    106   CO2 25 20*   BUN 7 7   CREATININE 0.8 0.8   CALCIUM 9.3 8.8   MG  --  1.8     Recent Labs   Lab 06/13/24  0544 06/14/24  0120   WBC 4.92 14.85*   HGB 13.9 12.4   HCT 43.5 39.2    253     Recent Labs   Lab 06/13/24  0544   INR 1.0   APTT 25.4     Microbiology Results (last 7 days)       ** No results found for the last 168 hours. **          All pertinent labs from the last 24 hours have been reviewed.    Significant Diagnostics:  I have reviewed all pertinent imaging results/findings within the past 24 hours.  Assessment/Plan:     * Chiari malformation type I  53 F with h/o exertional headaches and gait imbalance, prior DVT/PE on Coumadin presenting for elective Chiari decompression  on 6/13, recovering well:    --Patient admitted to [facility] on telemetry; ok for TTF to NS this AM      -q1h [facility]  in ICU, q2h neurochecks in stepdown, q4h neurochecks on floor  --PRN pain control, bowel reg while on narcotics  --SBP <160  --Post op Ancef x24h  --HOB >30  --ADAT  --PT/OT  --SHAR/SCD/SQH; continue to hold therapeutic LVX/Coumadin  --Continue to monitor clinically, notify NSGY immediately with any changes in neuro status    Dispo: ongoing, TTF NSGY    Closed with Monocryl/Dermabond    D/w Dr. León by NSGY team          Karla Vasquez MD  Neurosurgery  [facility] - Neuro Critical Care

## 2024-06-14 NOTE — NURSING
Patient Transferred to NPU Room 904       Upon arrival to the floor, patient greeted and oriented to room. Complete head to toe assessment completed per protocol. VSS, see flowsheet for details. Neuro assessment completed. Primary team notified of patient's transfer to floor. All current and transfer orders reviewed/reconciled per protocol. All emergency equipment set up in patient's room. Fall/seizure precautions initiated per providers orders. 4 Eyes skin assessment performed, see below for details. Reviewed assessment and rounding frequency with patient and family. Questions were encouraged and addressed. Repositioned patient for comfort with bed locked in lowest position, side rails up x 2, bed alarm set, and call light within reach. Instructed patient to call staff for mobility/assistance, verbalized understanding. No acute signs of distress noted at this time.         Nurses Note -- 4 Eyes      6/14/2024   4:54 PM      Skin assessed during: Transfer      [x] No Altered Skin Integrity Present    [x]Prevention Measures Documented      [] Yes- Altered Skin Integrity Present or Discovered   [] LDA Added if Not in Epic (Describe Wound)   [] New Altered Skin Integrity was Present on Admit and Documented in LDA   [] Wound Image Taken    Wound Care Consulted? No    Attending Nurse:  Tracy Rodrigues RN/Staff Member:  ROSENDA Larson      Sx incision

## 2024-06-14 NOTE — PROGRESS NOTES
[facility] Critical Care  Neurocritical Care  Progress Note    Admit Date: 6/13/2024  Service Date: 06/14/2024  Length of Stay: 1    Subjective:     Chief Complaint: Chiari malformation type I    History of Present Illness: 52 y/o F w/ PMH of HTN, RIS, prior strokes/TIA, DVTs anticoagulated on coumadin, and anemia who presents following Chiari decompression w/ Dr. León. Per chart review, patient has a longstanding history of headaches localized in the suboccipital and upper cervical area that have progressively worsened over the last year and intensify w/ Valsalva type maneuvers. She was also experiencing gait imbalance, dizziness, and blurred vision. On MRI brain and c-spine, she was found to have a Chiari I malformation w/ tonsillar descent of approximately 14mm causing crowding of the foramen magnum and an associated cervical syrinx. She is now s/p suboccipital craniectomy for chiari decompression and C1 laminectomy. She will be admitted to [facility] for post-op monitoring and further management.    Hospital Course: 06/14/2024 NAEO, [place] to [facility] [place]      Interval History:  See hospital course.     Review of Systems   Constitutional:  Negative for activity change and appetite change.   Respiratory:  Negative for cough and shortness of breath.    Gastrointestinal:  Negative for nausea and vomiting.   Genitourinary:  Negative for frequency and urgency.   Musculoskeletal:  Positive for neck pain. Negative for back pain.   Neurological:  Positive for headaches.   Psychiatric/Behavioral:  Negative for behavioral problems. The patient is not nervous/anxious.        Objective:     Vitals:  Temp: 97.6 °F (36.4 °C)  Pulse: 107  Rhythm: sinus tachycardia  BP: 117/71  MAP (mmHg): 89  Resp: 20  SpO2: 97 %    Temp  Min: 97.6 °F (36.4 °C)  Max: 98 °F (36.7 °C)  Pulse  Min: 88  Max: 124  BP  Min: 113/73  Max: 142/83  MAP (mmHg)  Min: 80  Max: 106  Resp  Min: 11  Max: 27  SpO2  Min: 95 %  Max: 99 %    06/13 0701 - 06/14 0700  In:  3149.4 [P.O.:480; I.V.:1469.1]  Out: 2510 [Urine:2510]   Unmeasured Output  Stool Occurrence: 0        Physical Exam  Constitutional:       Appearance: Normal appearance.   HENT:      Head: Normocephalic and atraumatic.   Eyes:      Extraocular Movements: Extraocular movements intact.      Pupils: Pupils are equal, round, and reactive to light.   Cardiovascular:      Rate and Rhythm: Normal rate and regular rhythm.   Pulmonary:      Effort: Pulmonary effort is normal.      Breath sounds: Normal breath sounds.   Abdominal:      General: Bowel sounds are normal.      Palpations: Abdomen is soft.   Neurological:      General: No focal deficit present.      Mental Status: She is alert and oriented to person, place, and time.              Medications:  Continuous Scheduledacetaminophen, 1,000 mg, Q6H  dexAMETHasone, 4 mg, Q6H  famotidine, 20 mg, BID  heparin (porcine), 5,000 Units, Q8H  losartan, 25 mg, Daily  methocarbamoL, 750 mg, QID  mupirocin, , BID  NIFEdipine, 60 mg, Daily  polyethylene glycol, 17 g, Daily  senna-docusate 8.6-50 mg, 1 tablet, BID    PRNhydrALAZINE, 10 mg, Q4H PRN  labetalol, 10 mg, Q4H PRN  magnesium oxide, 800 mg, PRN  magnesium oxide, 800 mg, PRN  morphine, 2 mg, Q4H PRN  ondansetron, 8 mg, Q6H PRN  potassium bicarbonate, 35 mEq, PRN  potassium bicarbonate, 50 mEq, PRN  potassium bicarbonate, 60 mEq, PRN  potassium, sodium phosphates, 2 packet, PRN  potassium, sodium phosphates, 2 packet, PRN  potassium, sodium phosphates, 2 packet, PRN  sodium chloride 0.9%, 10 mL, PRN      Today I personally reviewed pertinent medications, lines/drains/airways, imaging, cardiology results, laboratory results, microbiology results, notably:    Diet  Diet Adult Regular (IDDSI Level 7)  Diet Adult Regular (IDDSI Level 7)        Assessment/Plan:     Neuro  * Chiari malformation type I  52 y/o F w/ Chiari I malformation w/ with descent of the tonsils into the upper cervical region below C1. Now s/p Chiari  decompression and C1 laminectomy.     -Admit to NCC  -NSGY following  -VS/Neuro checks q1  -Dex 4 q6  -Famotidine BID while on dex  -Multimodal pain regimen  -SBP goal < 140  -Regular diet  -Monitor I/O  -CBC, CMP, Mag, Phos daily  -SCDs/SQH for DVT PPX  -PT/OT as appropriate    Syrinx of spinal cord  -S/p C1 laminectomy  -See Chiari malformation type I     Hx of cerebral infarction  -Hx of multiple strokes in 2022/2023 per chart review  -Takes ASA 81mg daily; currently on hold d/t surgery  -Resume ASA once cleared by NSGY    Cardiac/Vascular  Essential hypertension  -SBP goal < 140  -Home nifedipine, losartan resumed  -PRN labetalol/hydralazine/cardene    Hematology  Hypercoagulable state  -Hx of; 2/2 reduced protein C and protein S activity     Chronic anticoagulation  -2/2 hx of DVTs; 1st in 1992 which progressed to PE, 2nd around 1999/2000  -Home warfarin dose - 2.5mg daily  -Warfarin bridged to lovenox prior to procedure  -Continue to hold warfarin per NSGY  -Ok for SQH in AM    Other  Obstructive sleep apnea (adult) (pediatric)  -Home CPAP @ night if able w/ crani incision          The patient is being Prophylaxed for:  Venous Thromboembolism with: Mechanical  Stress Ulcer with: H2B  Ventilator Pneumonia with: not applicable    Activity Orders            Progressive Mobility Protocol (mobilize patient to their highest level of functioning at least twice daily) starting at 06/13 2000    Turn patient starting at 06/13 1400    Elevate HOB starting at 06/13 1209    Diet Adult Regular (IDDSI Level 7): Regular starting at 06/13 1041          Full Code    ABDULAZIZ PonceC  Neurocritical Care  [facility]

## 2024-06-14 NOTE — SUBJECTIVE & OBJECTIVE
Interval History:  See hospital course.     Review of Systems   Constitutional:  Negative for activity change and appetite change.   Respiratory:  Negative for cough and shortness of breath.    Gastrointestinal:  Negative for nausea and vomiting.   Genitourinary:  Negative for frequency and urgency.   Musculoskeletal:  Positive for neck pain. Negative for back pain.   Neurological:  Positive for headaches.   Psychiatric/Behavioral:  Negative for behavioral problems. The patient is not nervous/anxious.        Objective:     Vitals:  Temp: 97.6 °F (36.4 °C)  Pulse: 107  Rhythm: sinus tachycardia  BP: 117/71  MAP (mmHg): 89  Resp: 20  SpO2: 97 %    Temp  Min: 97.6 °F (36.4 °C)  Max: 98 °F (36.7 °C)  Pulse  Min: 88  Max: 124  BP  Min: 113/73  Max: 142/83  MAP (mmHg)  Min: 80  Max: 106  Resp  Min: 11  Max: 27  SpO2  Min: 95 %  Max: 99 %    06/13 0701 - 06/14 0700  In: 3149.4 [P.O.:480; I.V.:1469.1]  Out: 2510 [Urine:2510]   Unmeasured Output  Stool Occurrence: 0        Physical Exam  Constitutional:       Appearance: Normal appearance.   HENT:      Head: Normocephalic and atraumatic.   Eyes:      Extraocular Movements: Extraocular movements intact.      Pupils: Pupils are equal, round, and reactive to light.   Cardiovascular:      Rate and Rhythm: Normal rate and regular rhythm.   Pulmonary:      Effort: Pulmonary effort is normal.      Breath sounds: Normal breath sounds.   Abdominal:      General: Bowel sounds are normal.      Palpations: Abdomen is soft.   Neurological:      General: No focal deficit present.      Mental Status: She is alert and oriented to person, place, and time.              Medications:  Continuous Scheduledacetaminophen, 1,000 mg, Q6H  dexAMETHasone, 4 mg, Q6H  famotidine, 20 mg, BID  heparin (porcine), 5,000 Units, Q8H  losartan, 25 mg, Daily  methocarbamoL, 750 mg, QID  mupirocin, , BID  NIFEdipine, 60 mg, Daily  polyethylene glycol, 17 g, Daily  senna-docusate 8.6-50 mg, 1 tablet,  BID    PRNhydrALAZINE, 10 mg, Q4H PRN  labetalol, 10 mg, Q4H PRN  magnesium oxide, 800 mg, PRN  magnesium oxide, 800 mg, PRN  morphine, 2 mg, Q4H PRN  ondansetron, 8 mg, Q6H PRN  potassium bicarbonate, 35 mEq, PRN  potassium bicarbonate, 50 mEq, PRN  potassium bicarbonate, 60 mEq, PRN  potassium, sodium phosphates, 2 packet, PRN  potassium, sodium phosphates, 2 packet, PRN  potassium, sodium phosphates, 2 packet, PRN  sodium chloride 0.9%, 10 mL, PRN      Today I personally reviewed pertinent medications, lines/drains/airways, imaging, cardiology results, laboratory results, microbiology results, notably:    Diet  Diet Adult Regular (IDDSI Level 7)  Diet Adult Regular (IDDSI Level 7)

## 2024-06-14 NOTE — HOSPITAL COURSE
6/14: NAEON. Denies headache. No nausea/emesis overnight. Awaiting PT/OT. Ok for TTF this AM. Continue to hold Coumadin/therapeutic LVX.

## 2024-06-14 NOTE — H&P
[facility] - Neuro Critical Care  Neurocritical Care  History & Physical    Admit Date: 6/13/2024  Service Date: 06/13/2024  Length of Stay: 0    Subjective:     Chief Complaint: Chiari malformation type I    History of Present Illness: 54 y/o F w/ PMH of HTN, RIS, prior strokes/TIA, DVTs anticoagulated on coumadin, and anemia who presents following Chiari decompression w/ Dr. León. Per chart review, patient has a longstanding history of headaches localized in the suboccipital and upper cervical area that have progressively worsened over the last year and intensify w/ Valsalva type maneuvers. She was also experiencing gait imbalance, dizziness, and blurred vision. On MRI brain and c-spine, she was found to have a Chiari I malformation w/ tonsillar descent of approximately 14mm causing crowding of the foramen magnum and an associated cervical syrinx. She is now s/p suboccipital craniectomy for chiari decompression and C1 laminectomy. She will be admitted to [facility] for post-op monitoring and further management.      Past Medical History:   Diagnosis Date    Anemia     Anticoagulated on Coumadin     Deep vein thrombosis     DVT (deep venous thrombosis)     GERD (gastroesophageal reflux disease)     Headache     Hypertension     Pulmonary embolism      Past Surgical History:   Procedure Laterality Date    PULMONARY EMBOLISM SURGERY      TUBAL LIGATION        No current facility-administered medications on file prior to encounter.     Current Outpatient Medications on File Prior to Encounter   Medication Sig Dispense Refill    ascorbic acid, vitamin C, (VITAMIN C) 500 MG tablet Take 500 mg by mouth once daily.      aspirin (ECOTRIN) 81 MG EC tablet Take 1 tablet (81 mg total) by mouth once daily. 150 tablet 2    famotidine (PEPCID) 40 MG tablet Take 1 tablet (40 mg total) by mouth once daily. 30 tablet 5    ferrous sulfate (IRON) 325 mg (65 mg iron) Tab tablet Take 325 mg by mouth once daily.      NIFEdipine (PROCARDIA-XL) 60  MG (OSM) 24 hr tablet Take 1 tablet (60 mg total) by mouth once daily. 90 tablet 3    lancets (COAGUCHEK LANCETS) Misc 1 each by Misc.(Non-Drug; Combo Route) route once a week. 100 each 0    losartan (COZAAR) 25 MG tablet Take 1 tablet (25 mg total) by mouth once daily. (Patient not taking: Reported on 6/3/2024) 90 tablet 3    prothrombin time/INR test metr Misc 1 Device by Misc.(Non-Drug; Combo Route) route once a week. 1 each 0      Allergies: Pcn [penicillins]  Family History   Problem Relation Name Age of Onset    Arthritis Mother      Cancer Mother      Depression Mother      Early death Mother      Heart disease Mother      Hypertension Mother      Mental illness Mother      Miscarriages / Stillbirths Mother       Social History     Tobacco Use    Smoking status: Never     Passive exposure: Never    Smokeless tobacco: Never   Substance Use Topics    Alcohol use: No    Drug use: No     Review of Systems   Respiratory:  Negative for cough and shortness of breath.    Cardiovascular:  Negative for chest pain.   Gastrointestinal:  Negative for abdominal pain, nausea and vomiting.   Musculoskeletal:  Positive for neck stiffness. Negative for neck pain.   Neurological:  Positive for weakness (BLE) and headaches. Negative for speech difficulty and numbness.     Objective:     Vitals:    Temp: 98 °F (36.7 °C)  Pulse: (!) 120  Rhythm: sinus tachycardia  BP: 124/67  MAP (mmHg): 89  Resp: 14  SpO2: 98 %    Temp  Min: 96.8 °F (36 °C)  Max: 98 °F (36.7 °C)  Pulse  Min: 84  Max: 124  BP  Min: 117/55  Max: 150/94  MAP (mmHg)  Min: 80  Max: 117  Resp  Min: 11  Max: 22  SpO2  Min: 96 %  Max: 100 %    No intake/output data recorded.            Physical Exam  Vitals and nursing note reviewed.   Constitutional:       General: She is not in acute distress.     Appearance: Normal appearance.   HENT:      Head: Normocephalic.   Eyes:      Extraocular Movements: Extraocular movements intact.      Pupils: Pupils are equal, round, and  reactive to light.   Neck:      Comments: Neck stiffness following procedure; no tenderness  Cardiovascular:      Rate and Rhythm: Regular rhythm. Tachycardia present.      Pulses: Normal pulses.   Pulmonary:      Effort: Pulmonary effort is normal.   Abdominal:      Palpations: Abdomen is soft.   Musculoskeletal:         General: Normal range of motion.   Skin:     General: Skin is warm and dry.      Capillary Refill: Capillary refill takes less than 2 seconds.   Neurological:      Mental Status: She is alert and oriented to person, place, and time.      GCS: GCS eye subscore is 4. GCS verbal subscore is 5. GCS motor subscore is 6.      Cranial Nerves: Cranial nerves 2-12 are intact.      Sensory: Sensation is intact.      Motor: Weakness present.      Comments:   -E4 V5 M6  -PERRL  -Oriented to person, place, time, and situation  -Follows commands w/ all extremities  -MARINELLI spontaneously/purposefully  -RUE 5/5  -LUE 5/5  -RLE 4/5  -LLE 4/5            Today I personally reviewed pertinent medications, lines/drains/airways, cardiology results, laboratory results, notably: CBC; CMP; U/A    Assessment/Plan:     Neuro  * Chiari malformation type I  54 y/o F w/ Chiari I malformation w/ with descent of the tonsils into the upper cervical region below C1. Now s/p Chiari decompression and C1 laminectomy.     -Admit to NCC  -NSGY following  -VS/Neuro checks q1  -Dex 4 q6  -Famotidine BID while on dex  -Multimodal pain regimen  -SBP goal < 140  -Regular diet  -Monitor I/O  -CBC, CMP, Mag, Phos daily  -SCDs/SQH for DVT PPX  -PT/OT as appropriate    Syrinx of spinal cord  -S/p C1 laminectomy  -See Chiari malformation type I     Hx of cerebral infarction  -Hx of multiple strokes in 2022/2023 per chart review  -Takes ASA 81mg daily; currently on hold d/t surgery  -Resume ASA once cleared by NSGY    Cardiac/Vascular  Essential hypertension  -SBP goal < 140  -Home nifedipine, losartan resumed  -PRN  labetalol/hydralazine/cardene    Hematology  Hypercoagulable state  -Hx of; 2/2 reduced protein C and protein S activity     Chronic anticoagulation  -2/2 hx of DVTs; 1st in 1992 which progressed to PE, 2nd around 1999/2000  -Home warfarin dose - 2.5mg daily  -Warfarin bridged to lovenox prior to procedure  -Continue to hold warfarin per NSGY  -Ok for SQH in AM    Other  Obstructive sleep apnea (adult) (pediatric)  -Home CPAP @ night if able w/ crani incision          The patient is being Prophylaxed for:  Venous Thromboembolism with: Mechanical or Chemical  Stress Ulcer with: H2B  Ventilator Pneumonia with: not applicable    Activity Orders            Progressive Mobility Protocol (mobilize patient to their highest level of functioning at least twice daily) starting at 06/13 2000    Turn patient starting at 06/13 1400    Elevate HOB starting at 06/13 1209    Diet Adult Regular (IDDSI Level 7): Regular starting at 06/13 1041          Full Code    Critical care time spent on the evaluation and treatment of severe organ dysfunction, review of pertinent labs and imaging studies, discussions with consulting providers and discussions with patient/family: 30 minutes    Karla Peralta NP  Neurocritical Care  [facility] Neuro Critical [facility]

## 2024-06-14 NOTE — PLAN OF CARE
ODETTE sent hand off to case management in the step down unit.     EN Germain, LMSW [facility]  [id number]

## 2024-06-14 NOTE — ANESTHESIA POSTPROCEDURE EVALUATION
Anesthesia Post Evaluation    Patient: Elva Burroughs    Procedure(s) Performed: Procedure(s) (LRB):  DECOMPRESSION, CHIARI MALFORMATION, BY 1ST CERVICAL VERTEBRA POSTERIOR ARCH REMOVAL (Bilateral)    Final Anesthesia Type: general      Patient location during evaluation: PACU  Patient participation: Yes- Able to Participate  Level of consciousness: awake and alert and oriented  Post-procedure vital signs: reviewed and stable  Pain management: adequate  Airway patency: patent    PONV status at discharge: No PONV  Anesthetic complications: no      Cardiovascular status: hemodynamically stable  Respiratory status: unassisted, spontaneous ventilation and room air  Hydration status: euvolemic  Follow-up not needed.        Vital signs stable, no issues at this time.    No case tracking events are documented in the log.

## 2024-06-14 NOTE — ASSESSMENT & PLAN NOTE
54 y/o F w/ Chiari I malformation w/ with descent of the tonsils into the upper cervical region below C1. Now s/p Chiari decompression and C1 laminectomy.     -Admit to NCC  -NSGY following  -VS/Neuro checks q1  -Dex 4 q6  -Famotidine BID while on dex  -Multimodal pain regimen  -SBP goal < 140  -Regular diet  -Monitor I/O  -CBC, CMP, Mag, Phos daily  -SCDs/SQH for DVT PPX  -PT/OT as appropriate

## 2024-06-14 NOTE — ASSESSMENT & PLAN NOTE
-Hx of multiple strokes in 2022/2023 per chart review  -Takes ASA 81mg daily; currently on hold d/t surgery  -Resume ASA once cleared by NSGY

## 2024-06-14 NOTE — ASSESSMENT & PLAN NOTE
53 F with h/o exertional headaches and gait imbalance, prior DVT/PE on Coumadin presenting for elective Chiari decompression  on 6/13, recovering well:    --Patient admitted to [facility] on telemetry; ok for TTF to NSGY this AM      -q1h neurochecks in ICU, q2h neurochecks in stepdown, q4h neurochecks on floor  --PRN pain control, bowel reg while on narcotics  --SBP <160  --Post op Ancef x24h  --HOB >30  --ADAT  --PT/OT  --SHAR/SCD/SQH; continue to hold therapeutic LVX/Coumadin  --Continue to monitor clinically, notify NSGY immediately with any changes in neuro status    Dispo: ongoing, TTF NSGY    Closed with Monocryl/Dermabond    D/w Dr. León by NSGY team

## 2024-06-14 NOTE — SUBJECTIVE & OBJECTIVE
Interval History: NAEON. Denies headache. No nausea/emesis overnight. Awaiting PT/OT. Ok for TTF this AM. Continue to hold Coumadin/therapeutic LVX.    Medications:  Continuous Infusions:   nicardipine  0-15 mg/hr Intravenous Continuous   Stopped at 06/13/24 1849     Scheduled Meds:   acetaminophen  1,000 mg Oral Q6H    dexAMETHasone  4 mg Intravenous Q6H    famotidine  20 mg Oral BID    heparin (porcine)  5,000 Units Subcutaneous Q8H    losartan  25 mg Oral Daily    methocarbamoL  750 mg Oral QID    mupirocin   Nasal BID    NIFEdipine  60 mg Oral Daily    polyethylene glycol  17 g Oral Daily    senna-docusate 8.6-50 mg  1 tablet Oral BID     PRN Meds:  Current Facility-Administered Medications:     hydrALAZINE, 10 mg, Intravenous, Q4H PRN    labetalol, 10 mg, Intravenous, Q4H PRN    magnesium oxide, 800 mg, Oral, PRN    magnesium oxide, 800 mg, Oral, PRN    morphine, 2 mg, Intravenous, Q4H PRN    ondansetron, 8 mg, Intravenous, Q6H PRN    potassium bicarbonate, 35 mEq, Oral, PRN    potassium bicarbonate, 50 mEq, Oral, PRN    potassium bicarbonate, 60 mEq, Oral, PRN    potassium, sodium phosphates, 2 packet, Oral, PRN    potassium, sodium phosphates, 2 packet, Oral, PRN    potassium, sodium phosphates, 2 packet, Oral, PRN    sodium chloride 0.9%, 10 mL, Intravenous, PRN     Review of Systems  Objective:     Weight: 84.1 kg (185 lb 6.5 oz)  Body mass index is 29.04 kg/m².  Vital Signs (Most Recent):  Temp: 97.6 °F (36.4 °C) (06/14/24 0701)  Pulse: 96 (06/14/24 0701)  Resp: 13 (06/14/24 0701)  BP: 113/73 (06/14/24 0701)  SpO2: 96 % (06/14/24 0701) Vital Signs (24h Range):  Temp:  [96.8 °F (36 °C)-98 °F (36.7 °C)] 97.6 °F (36.4 °C)  Pulse:  [] 96  Resp:  [11-27] 13  SpO2:  [95 %-100 %] 96 %  BP: (113-150)/(55-97) 113/73                         Female External Urinary Catheter w/ Suction 06/14/24 0630 (Active)   Skin no redness;no breakdown 06/14/24 0701   Tolerance no signs/symptoms of discomfort 06/14/24 0701    Suction Continuous suction at 60 mmHg 06/14/24 0701          Physical Exam         Neurosurgery Physical Exam  General: no distress  Head: Non-traumatic, normocephalic  Eyes: Pupils equal, EOMi  Neck: Supple, normal ROM, no tenderness to palpation  CVS: Normal rate and rhythm, distal pulses present  Pulm: Symmetric expansion, no respiratory distress  GI: Abdomen nondistended, nontender  MSK: Moves all extremities without restriction, atraumatic  Skin: Dressing c/d/i  Psych: Normal thought content and cognition  Neuro: AOx3, GCS [id number]  CNII-XII: Intact on fine exam, PERRL, EOMi, facial sensation preserved, no facial asymmetry, tongue/uvula/palate midline, shoulder shrug equal, No pronator drift  Extremities:  Motor:  Upper Extremity:                                  Deltoids        Triceps        Biceps        WE        WF                Interosseous           R        5/5              5/5              5/5            5/5         5/5         5/5                5/5           L        5/5              5/5              5/5            5/5         5/5         5/5                5/5  Lower Extremity:                                      HF        KE        KF        DF        PF        EHL           R       5/5        5/5        5/5        5/5        5/5        5/5           L       5/5        5/5        5/5        5/5        5/5        5/5    Reflexes:     DTR: 2+ and symmetrically throughout    Sensory:      Sensorium intact throughout, no sensory level present    Coordination:      Coordination intact throughout      Significant Labs:  Recent Labs   Lab 06/13/24  0544 06/14/24  0011    135*    137   K 3.3* 3.5    106   CO2 25 20*   BUN 7 7   CREATININE 0.8 0.8   CALCIUM 9.3 8.8   MG  --  1.8     Recent Labs   Lab 06/13/24  0544 06/14/24  0120   WBC 4.92 14.85*   HGB 13.9 12.4   HCT 43.5 39.2    253     Recent Labs   Lab 06/13/24  0544   INR 1.0   APTT 25.4     Microbiology Results (last 7  days)       ** No results found for the last 168 hours. **          All pertinent labs from the last 24 hours have been reviewed.    Significant Diagnostics:  I have reviewed all pertinent imaging results/findings within the past 24 hours.

## 2024-06-14 NOTE — PLAN OF CARE
[facility]  Initial Discharge Assessment       Primary Care Provider: Zion Almaraz FNP    Admission Diagnosis: Chiari malformation type I [G93.5]    Admission Date: 6/13/2024  Expected Discharge Date: 6/19/2024    Transition of Care Barriers: (P) None    Payor: [place] [employer] / Plan: UMR [employer]US TIERED / Product Type: Commercial /     Extended Emergency Contact Information  Primary Emergency Contact: Mata Burroughs  Mobile Phone: [phone]  Relation: Spouse  Secondary Emergency Contact: ParksGavin  Mobile Phone: [phone]  Relation: Daughter   needed? No    Discharge Plan A: (P) Home with family  Discharge Plan B: (P) Home Health      [facility] [address] [place] [address] ROAD  [address] [place] [id number]  Phone: [phone] Fax: [phone]      Initial Assessment (most recent)       Adult Discharge Assessment - 06/14/24 1133          Discharge Assessment    Assessment Type Discharge Planning Assessment (P)      Confirmed/corrected address, phone number and insurance Yes (P)      Confirmed Demographics Correct on Facesheet (P)      Source of Information family;patient (P)      Communicated MARYSOL with patient/caregiver Other (see comments) (P)    Date tentative    Reason For Admission Chiari malformation type I (P)      People in Home child(geovanni), adult;spouse (P)      Facility Arrived From: Home (P)      Do you expect to return to your current living situation? Yes (P)      Do you have help at home or someone to help you manage your care at home? Yes (P)      Who are your caregiver(s) and their phone number(s)?  Mata Burroughs [phone] (P)      Prior to hospitilization cognitive status: Alert/Oriented (P)      Current cognitive status: Alert/Oriented (P)      Walking or Climbing Stairs Difficulty yes (P)      Walking or Climbing Stairs ambulation difficulty, requires equipment (P)      Mobility Management  "Walker (P)      Dressing/Bathing Difficulty yes (P)      Dressing/Bathing bathing difficulty, assistance 1 person (P)      Home Accessibility not wheelchair accessible (P)      Home Layout Able to live on 1st floor (P)      Equipment Currently Used at Home walker, rolling;CPAP (P)    Owns CPAP, doesn't use    Readmission within 30 days? No (P)      Patient currently being followed by outpatient case management? No (P)      Do you currently have service(s) that help you manage your care at home? No (P)      Do you take prescription medications? Yes (P)      Do you have prescription coverage? Yes (P)      Coverage UMR (P)      Do you have any problems affording any of your prescribed medications? No (P)      Is the patient taking medications as prescribed? yes (P)      Who is going to help you get home at discharge?  Mata Burroughs [phone] (P)      How do you get to doctors appointments? family or friend will provide (P)      Are you on dialysis? No (P)      Do you take coumadin? Yes (P)      Who monitors your labs? "I monitor my INR at home" (P)      Discharge Plan A Home with family (P)      Discharge Plan B Home Health (P)      DME Needed Upon Discharge  -- (P)    tbd    Discharge Plan discussed with: Spouse/sig other;Patient (P)      Name(s) and Number(s)  Mata Burroughs [phone] (P)      Transition of Care Barriers None (P)         OTHER    Name(s) of People in Home  Mata Burroughs [phone] (P)                    Discharge Plan A and Plan B have been determined by review of patient's clinical status, future medical and therapeutic needs, and coverage/benefits for post-acute care in coordination with multidisciplinary team members.    Pt lives in a one story home with 3 ROXANA. Pt was somewhat dependent with ADL's prior to admission and sometimes uses a walker. She does not use the CPAP stating "It hurts my head". She is not on dialysis, but IS on Coumadin, stating she "checks " "my INR's at home". Pt has transportation home with family. SW will follow for all discharge planning needs.     EN Germain LMSW  [facility]  [id number]               "

## 2024-06-14 NOTE — HPI
Elva Burroughs is a 53 y.o.  female with below PMH including RIS and prior strokes/TIA for which she is on warfarin and ASA, who is referred to me by Dr Sutherland for evaluation of chiari malformation.  She reports headaches that are frontal and occipital in location.  Her headaches used to be daily but now occur from every other week to 1-2 times per week.  She finds that Tylenol and aspirin have been helpful.  She reports that coughing and bending over intensify her headaches.  She does not think that her headaches are positional however she finds that resting or lying down will help her headaches.     She reports imbalance and gait disturbance.     She also reports dizziness and blurry vision.  She has been evaluated by Ophthalmology who reports that she has 2020 vision and does not have any papilledema.  The source of her blurry vision remains unclear.       Whenever her recent MRIs demonstrated features of possible intracranial hypotension however she has no confirmed evidence of a CSF leak or any spinal trauma from which a leak could have resulted.  She did faint when she had her stroke but never has injured her spin

## 2024-06-15 VITALS
RESPIRATION RATE: 16 BRPM | HEIGHT: 67 IN | HEART RATE: 77 BPM | BODY MASS INDEX: 29.1 KG/M2 | DIASTOLIC BLOOD PRESSURE: 64 MMHG | OXYGEN SATURATION: 96 % | WEIGHT: 185.44 LBS | SYSTOLIC BLOOD PRESSURE: 112 MMHG | TEMPERATURE: 99 F

## 2024-06-15 LAB
ALBUMIN SERPL BCP-MCNC: 2.6 G/DL (ref 3.5–5.2)
ALP SERPL-CCNC: 63 U/L (ref 55–135)
ALT SERPL W/O P-5'-P-CCNC: 15 U/L (ref 10–44)
ANION GAP SERPL CALC-SCNC: 7 MMOL/L (ref 8–16)
AST SERPL-CCNC: 18 U/L (ref 10–40)
BILIRUB SERPL-MCNC: 0.2 MG/DL (ref 0.1–1)
BUN SERPL-MCNC: 13 MG/DL (ref 6–20)
CALCIUM SERPL-MCNC: 8.6 MG/DL (ref 8.7–10.5)
CHLORIDE SERPL-SCNC: 105 MMOL/L (ref 95–110)
CO2 SERPL-SCNC: 28 MMOL/L (ref 23–29)
CREAT SERPL-MCNC: 0.7 MG/DL (ref 0.5–1.4)
EST. GFR  (NO RACE VARIABLE): >60 ML/MIN/1.73 M^2
GLUCOSE SERPL-MCNC: 123 MG/DL (ref 70–110)
MAGNESIUM SERPL-MCNC: 2 MG/DL (ref 1.6–2.6)
PHOSPHATE SERPL-MCNC: 3 MG/DL (ref 2.7–4.5)
POTASSIUM SERPL-SCNC: 4 MMOL/L (ref 3.5–5.1)
PROT SERPL-MCNC: 6.4 G/DL (ref 6–8.4)
SODIUM SERPL-SCNC: 140 MMOL/L (ref 136–145)

## 2024-06-15 PROCEDURE — 99024 POSTOP FOLLOW-UP VISIT: CPT | Mod: ,,, | Performed by: PHYSICIAN ASSISTANT

## 2024-06-15 PROCEDURE — 84100 ASSAY OF PHOSPHORUS: CPT | Performed by: REGISTERED NURSE

## 2024-06-15 PROCEDURE — 36415 COLL VENOUS BLD VENIPUNCTURE: CPT | Performed by: REGISTERED NURSE

## 2024-06-15 PROCEDURE — 63600175 PHARM REV CODE 636 W HCPCS: Performed by: STUDENT IN AN ORGANIZED HEALTH CARE EDUCATION/TRAINING PROGRAM

## 2024-06-15 PROCEDURE — 80053 COMPREHEN METABOLIC PANEL: CPT | Performed by: REGISTERED NURSE

## 2024-06-15 PROCEDURE — 25000003 PHARM REV CODE 250: Performed by: STUDENT IN AN ORGANIZED HEALTH CARE EDUCATION/TRAINING PROGRAM

## 2024-06-15 PROCEDURE — 83735 ASSAY OF MAGNESIUM: CPT | Performed by: REGISTERED NURSE

## 2024-06-15 RX ORDER — ENOXAPARIN SODIUM 100 MG/ML
40 INJECTION SUBCUTANEOUS DAILY
Qty: 2.8 ML | Refills: 0 | Status: SHIPPED | OUTPATIENT
Start: 2024-06-15 | End: 2024-06-22

## 2024-06-15 RX ORDER — METHOCARBAMOL 750 MG/1
750 TABLET, FILM COATED ORAL 4 TIMES DAILY
Qty: 40 TABLET | Refills: 0 | Status: SHIPPED | OUTPATIENT
Start: 2024-06-15 | End: 2024-06-25

## 2024-06-15 RX ORDER — OXYCODONE AND ACETAMINOPHEN 5; 325 MG/1; MG/1
1 TABLET ORAL EVERY 4 HOURS PRN
Qty: 42 TABLET | Refills: 0 | Status: SHIPPED | OUTPATIENT
Start: 2024-06-15

## 2024-06-15 RX ADMIN — HEPARIN SODIUM 5000 UNITS: 5000 INJECTION INTRAVENOUS; SUBCUTANEOUS at 05:06

## 2024-06-15 RX ADMIN — FAMOTIDINE 20 MG: 20 TABLET ORAL at 08:06

## 2024-06-15 RX ADMIN — METHOCARBAMOL 750 MG: 750 TABLET ORAL at 08:06

## 2024-06-15 RX ADMIN — ACETAMINOPHEN 1000 MG: 500 TABLET ORAL at 05:06

## 2024-06-15 RX ADMIN — LOSARTAN POTASSIUM 25 MG: 25 TABLET, FILM COATED ORAL at 08:06

## 2024-06-15 RX ADMIN — NIFEDIPINE 60 MG: 30 TABLET, FILM COATED, EXTENDED RELEASE ORAL at 08:06

## 2024-06-15 RX ADMIN — DEXAMETHASONE SODIUM PHOSPHATE 4 MG: 4 INJECTION INTRA-ARTICULAR; INTRALESIONAL; INTRAMUSCULAR; INTRAVENOUS; SOFT TISSUE at 05:06

## 2024-06-15 NOTE — DISCHARGE INSTRUCTIONS
Please follow ONLY the instructions that are checked below.    Activity Restrictions:  [x]  Return to work will be determined on an individual basis.  [x]  No lifting greater than 10 pounds.  [x]  No driving or operating machinery:  [x]  until cleared by your surgeon.  [x]  while taking narcotic pain medications or muscle relaxants.  [x]  Increase ambulation over the next 2 weeks so that you are walking 2 miles per day at 2 weeks post-operatively.  [x]  Walk on paved surfaces only. It is okay to walk up and down stairs while holding onto a side rail.  [x]  No sexual activity for 2-3 weeks.    Discharge Medication/Follow-up:  [x]  Please refer to discharge medication reconciliation form.  [x]  Do not take ANY non-steroidal anti-inflammatory drugs (NSAIDS), including the following: ibuprofen, naprosyn, Aleve, Advil, Indocin, Mobic, or Celebrex for:  [x]  4 weeks  []  8 weeks  []  6 months  [x]  Prescriptions for appropriate medication will be given upon discharge.  [x]  Take docusate (Colace 100 mg): take one capsule a day as needed for constipation. You can get this over the counter.  [x]  Follow-up appointment:  [x]  10-14 days post-op for wound check by physician assistant/nurse  [x]  4-6 weeks with MD:  []  with CT / MRI  []  without CT / MRI  [x]  An appointment will be mailed to you.    Wound Care:  []  Remove dressing or bandaid in    days.  [x]  No bandage required. Keep your incision open to the air.  [x]  You may shower on the 2nd day after your surgery. Have the force of water hit you opposite from the incision. Pat the incision dry after your shower; do not scrub the incision.  [x]  You cannot take a bath until 8 weeks after surgery.  []  Apply bacitracin to incision twice a day for    more days.    Call your doctor or go to the Emergency Room for any signs of infection, including: increased redness, drainage, pain, or fever (temperature ?101.5 for 24 hours). Call your doctor or go to the Emergency Room if  there are any localized neurological changes; problems with speech, vision, numbness, tingling, weakness, or severe headache; or for other concerns.    Special Instructions:  [x]  No use of tobacco products.  [x]  Diet: Please eat a regular diet as tolerated.  []  Other diet:              Specific physician instructions:  OK TO RESUME LOVENOX BRIDGE TO COUMADIN ON 6/18. PLEASE CONTACT YOUR PCP FOR INSTRUCTIONS     Physicians need 3 days' notice for pain medicine to be refilled. Pain medicine will only be refilled between 8 AM and 5 PM, Monday through Friday, due to Food and Drug Administration regulation of documentation.    If you have any questions about this form, please call [phone].    Form No. 50435 (Revised 10/31/2013)

## 2024-06-15 NOTE — PLAN OF CARE
Problem: Adult Inpatient Plan of Care  Goal: Plan of Care Review  Outcome: Progressing  Goal: Absence of Hospital-Acquired Illness or Injury  Outcome: Progressing  Intervention: Identify and Manage Fall Risk  Flowsheets (Taken 6/15/2024 0500)  Safety Promotion/Fall Prevention:   assistive device/personal item within reach   bed alarm set   Fall Risk signage in place   family to remain at bedside   lighting adjusted   medications reviewed   muscle strengthening facilitated   nonskid shoes/socks when out of bed   instructed to call staff for mobility  Intervention: Prevent and Manage VTE (Venous Thromboembolism) Risk  Flowsheets (Taken 6/15/2024 0500)  VTE Prevention/Management:   remove, assess skin, and reapply sequential compression device   ambulation promoted  Goal: Optimal Comfort and Wellbeing  Outcome: Progressing  Intervention: Monitor Pain and Promote Comfort  Flowsheets (Taken 6/15/2024 0500)  Pain Management Interventions:   pain management plan reviewed with patient/caregiver   medication offered but refused

## 2024-06-15 NOTE — PLAN OF CARE
Post op follow up appointment and post op appt noted on AVS. Family to provide transportation home.   06/15/24 0933   Final Note   Assessment Type Final Discharge Note   Anticipated Discharge Disposition Home   Hospital Resources/Appts/Education Provided Provided patient/caregiver with written discharge plan information;Appointments scheduled and added to AVS   Post-Acute Status   Discharge Delays None known at this time     [facility] - Neurosurgery (Hospital)  Discharge Final Note    Primary Care Provider: Zion Almaraz FNP    Expected Discharge Date: 6/15/2024    Final Discharge Note (most recent)       Final Note - 06/15/24 0933          Final Note    Assessment Type Final Discharge Note (P)      Anticipated Discharge Disposition Home or Self Care (P)      Hospital Resources/Appts/Education Provided Provided patient/caregiver with written discharge plan information;Appointments scheduled and added to AVS (P)         Post-Acute Status    Discharge Delays None known at this time (P)                      Important Message from Medicare

## 2024-06-15 NOTE — DISCHARGE SUMMARY
[facility] ([facility])  Neurosurgery  Discharge Summary      Patient Name: Elva Burroughs  MRN: [id number]  Admission Date: 6/13/2024  Hospital Length of Stay: 2 days  Discharge Date and Time:  06/15/2024 9:15 AM  Attending Physician: Juan Ramon León DO   Discharging Provider: Palma Crowley PA-C  Primary Care Provider: Zion Almaraz FNP    HPI:   Elva Burroughs is a 53 y.o.  female with below PMH including RIS and prior strokes/TIA for which she is on warfarin and ASA, who is referred to me by Dr Sutherland for evaluation of chiari malformation.  She reports headaches that are frontal and occipital in location.  Her headaches used to be daily but now occur from every other week to 1-2 times per week.  She finds that Tylenol and aspirin have been helpful.  She reports that coughing and bending over intensify her headaches.  She does not think that her headaches are positional however she finds that resting or lying down will help her headaches.     She reports imbalance and gait disturbance.     She also reports dizziness and blurry vision.  She has been evaluated by Ophthalmology who reports that she has 2020 vision and does not have any papilledema.  The source of her blurry vision remains unclear.       Whenever her recent MRIs demonstrated features of possible intracranial hypotension however she has no confirmed evidence of a CSF leak or any spinal trauma from which a leak could have resulted.  She did faint when she had her stroke but never has injured her spin    Procedure(s) (LRB):  DECOMPRESSION, CHIARI MALFORMATION, BY 1ST CERVICAL VERTEBRA POSTERIOR ARCH REMOVAL (Bilateral)     Hospital Course: 6/14: NAEON. Denies headache. No nausea/emesis overnight. Awaiting PT/OT. Ok for TTF this AM. Continue to hold Coumadin/therapeutic LVX.  6/15: NAEON. Pt stepped down to NPU under NSGY overnight. Pain is controlled. She has been OOB ambulating. + flatus. Tolerating po diet. Discharge  instructions given verbally to patient and family. All of their questions were answered. They were encouraged to call the clinic with any questions or concerns prior to follow up appt.     Physical Exam:   General: no distress  Head: Non-traumatic, normocephalic  Eyes: Pupils equal, EOMi  Neck: Supple, normal ROM, no tenderness to palpation  CVS: Normal rate and rhythm, distal pulses present  Pulm: Symmetric expansion, no respiratory distress  GI: Abdomen nondistended, nontender  MSK: Moves all extremities without restriction, atraumatic  Skin: Dressing c/d/i  Psych: Normal thought content and cognition  Neuro: AOx3, GCS E4V5M6  CNII-XII: Intact on fine exam, PERRL, EOMi, facial sensation preserved, no facial asymmetry, tongue/uvula/palate midline, shoulder shrug equal, No pronator drift  Extremities:  Motor:  Upper Extremity:                                  Deltoids        Triceps        Biceps        WE        WF                Interosseous           R        5/5              5/5              5/5            5/5         5/5         5/5                5/5           L        5/5              5/5              5/5            5/5         5/5         5/5                5/5  Lower Extremity:                                      HF        KE        KF        DF        PF        EHL           R       5/5        5/5        5/5        5/5        5/5        5/5           L       5/5        5/5        5/5        5/5        5/5        5/5       Sensory:      Sensorium intact throughout, no sensory level present     Coordination:      Coordination intact throughout    Goals of Care Treatment Preferences:  Code Status: Full Code      Consults:   Consults (From admission, onward)          Status Ordering Provider     IP consult to case management/social work  Once        Provider:  (Not yet assigned)    Acknowledged JOSE DIGGS            Significant Diagnostic Studies: Labs: BMP:   Recent Labs   Lab 06/14/24  0011  06/15/24  0456   * 123*    140   K 3.5 4.0    105   CO2 20* 28   BUN 7 13   CREATININE 0.8 0.7   CALCIUM 8.8 8.6*   MG 1.8 2.0    and CBC   Recent Labs   Lab 06/14/24  0120   WBC 14.85*   HGB 12.4   HCT 39.2          Pending Diagnostic Studies:       Procedure Component Value Units Date/Time    EKG, 12 - Lead [[id number]]     Order Status: Sent Lab Status: No result           Final Active Diagnoses:    Diagnosis Date Noted POA    PRINCIPAL PROBLEM:  Chiari malformation type I [G93.5] 05/06/2022 Yes     Chronic    Syrinx of spinal cord [G95.0] 05/15/2024 Yes    Hypercoagulable state [D68.59] 06/02/2022 Yes     Chronic    Hx of cerebral infarction [Z86.73] 05/04/2022 Not Applicable    Obstructive sleep apnea (adult) (pediatric) [G47.33]  Yes    Essential hypertension [I10] 05/17/2018 Yes     Chronic    Chronic anticoagulation [Z79.01] 05/17/2018 Not Applicable     Chronic      Problems Resolved During this Admission:      Discharged Condition: good     Disposition: Home or Self Care    Follow Up:   Future Appointments   Date Time Provider Department Center   7/3/2024  9:00 AM Juan Ramon León DO [facility] NEUROS8 [facility]   7/11/2024 10:30 AM LAB, [facility] MILO BIRD [facility] MB LAB [place]   7/11/2024 11:00 AM [facility] US1 [facility] ULTRA [place]   7/12/2024 10:30 AM Viky Villavicencio MD [facility] OC [place]   8/26/2024 11:20 AM Zion Almaraz FNP [facility]  [facility]   12/5/2024 11:20 AM Lisa Sutherland MD [facility] MSC [facility]        Patient Instructions:      Notify your health care provider if you experience any of the following:  temperature >100.4     Notify your health care provider if you experience any of the following:  persistent nausea and vomiting or diarrhea     Notify your health care provider if you experience any of the following:  severe uncontrolled pain     Notify your health care provider if you experience any of the following:  redness, tenderness, or signs of  infection (pain, swelling, redness, odor or green/yellow discharge around incision site)     Notify your health care provider if you experience any of the following:  difficulty breathing or increased cough     Notify your health care provider if you experience any of the following:  severe persistent headache     Notify your health care provider if you experience any of the following:  worsening rash     Notify your health care provider if you experience any of the following:  persistent dizziness, light-headedness, or visual disturbances     Notify your health care provider if you experience any of the following:  increased confusion or weakness     Medications:  Reconciled Home Medications:      Medication List        START taking these medications      methocarbamoL 750 MG Tab  Commonly known as: ROBAXIN  Take 1 tablet (750 mg total) by mouth 4 (four) times daily. for 10 days     oxyCODONE-acetaminophen 5-325 mg per tablet  Commonly known as: PERCOCET  Take 1 tablet by mouth every 4 (four) hours as needed for Pain.            CONTINUE taking these medications      acetaminophen 500 MG tablet  Commonly known as: TYLENOL  Take 1,000 mg by mouth daily as needed for Pain.     enoxaparin 40 mg/0.4 mL Syrg  Commonly known as: LOVENOX  Inject 0.4 mLs (40 mg total) into the skin once daily. for 7 days     famotidine 40 MG tablet  Commonly known as: PEPCID  Take 1 tablet (40 mg total) by mouth once daily.     IRON 325 mg (65 mg iron) Tab tablet  Generic drug: ferrous sulfate  Take 325 mg by mouth once daily.     lancets Misc  Commonly known as: COAGUCHEK LANCETS  1 each by Misc.(Non-Drug; Combo Route) route once a week.     NIFEdipine 60 MG (OSM) 24 hr tablet  Commonly known as: PROCARDIA-XL  Take 1 tablet (60 mg total) by mouth once daily.     prothrombin time/INR test metr Misc  1 Device by Misc.(Non-Drug; Combo Route) route once a week.     VITAMIN C 500 MG tablet  Generic drug: ascorbic acid (vitamin C)  Take 500 mg  by mouth once daily.            STOP taking these medications      aspirin 81 MG EC tablet  Commonly known as: ECOTRIN     warfarin 2.5 MG tablet  Commonly known as: COUMADIN            ASK your doctor about these medications      losartan 25 MG tablet  Commonly known as: COZAAR  Take 1 tablet (25 mg total) by mouth once daily.              Palma Crowley PA-C  Neurosurgery  [facility] \[facility] [place] [facility]\"")

## 2024-06-15 NOTE — NURSING
RN removed IV and tele monitor. RN reviewed dc paperwork c pt and spouse at bedside. All questions, comments, concerns addressed. Pt being dc'd home, all belongings sent c pt. Upon dc pt AAOx4, VSS, JODI Willett

## 2024-06-18 ENCOUNTER — PATIENT OUTREACH (OUTPATIENT)
Dept: ADMINISTRATIVE | Facility: CLINIC | Age: 54
End: 2024-06-18
Payer: COMMERCIAL

## 2024-06-18 NOTE — PROGRESS NOTES
C3 nurse spoke with Elva Burroughs  for a TCC post hospital discharge follow up call. The patient has a scheduled HOS appointment with Zion Almaraz NP on 6/21/2024 @ 9:40 AM.

## 2024-07-03 ENCOUNTER — OFFICE VISIT (OUTPATIENT)
Dept: NEUROSURGERY | Facility: CLINIC | Age: 54
End: 2024-07-03
Payer: COMMERCIAL

## 2024-07-03 ENCOUNTER — TELEPHONE (OUTPATIENT)
Dept: NEUROSURGERY | Facility: CLINIC | Age: 54
End: 2024-07-03
Payer: COMMERCIAL

## 2024-07-03 VITALS — SYSTOLIC BLOOD PRESSURE: 145 MMHG | HEART RATE: 115 BPM | DIASTOLIC BLOOD PRESSURE: 95 MMHG | TEMPERATURE: 98 F

## 2024-07-03 DIAGNOSIS — Z98.890 STATUS POST CRANIECTOMY: ICD-10-CM

## 2024-07-03 DIAGNOSIS — G93.5 CHIARI MALFORMATION TYPE I: Primary | Chronic | ICD-10-CM

## 2024-07-03 PROCEDURE — 1160F RVW MEDS BY RX/DR IN RCRD: CPT | Mod: CPTII,S$GLB,, | Performed by: NEUROLOGICAL SURGERY

## 2024-07-03 PROCEDURE — 99999 PR PBB SHADOW E&M-EST. PATIENT-LVL III: CPT | Mod: PBBFAC,,, | Performed by: NEUROLOGICAL SURGERY

## 2024-07-03 PROCEDURE — 1159F MED LIST DOCD IN RCRD: CPT | Mod: CPTII,S$GLB,, | Performed by: NEUROLOGICAL SURGERY

## 2024-07-03 PROCEDURE — 3077F SYST BP >= 140 MM HG: CPT | Mod: CPTII,S$GLB,, | Performed by: NEUROLOGICAL SURGERY

## 2024-07-03 PROCEDURE — 4010F ACE/ARB THERAPY RXD/TAKEN: CPT | Mod: CPTII,S$GLB,, | Performed by: NEUROLOGICAL SURGERY

## 2024-07-03 PROCEDURE — 3080F DIAST BP >= 90 MM HG: CPT | Mod: CPTII,S$GLB,, | Performed by: NEUROLOGICAL SURGERY

## 2024-07-03 PROCEDURE — 99024 POSTOP FOLLOW-UP VISIT: CPT | Mod: S$GLB,,, | Performed by: NEUROLOGICAL SURGERY

## 2024-07-03 PROCEDURE — 3044F HG A1C LEVEL LT 7.0%: CPT | Mod: CPTII,S$GLB,, | Performed by: NEUROLOGICAL SURGERY

## 2024-07-03 RX ORDER — DOXYCYCLINE 100 MG/1
100 CAPSULE ORAL EVERY 12 HOURS
Qty: 28 CAPSULE | Refills: 0 | Status: SHIPPED | OUTPATIENT
Start: 2024-07-03

## 2024-07-03 RX ORDER — SULFAMETHOXAZOLE AND TRIMETHOPRIM 800; 160 MG/1; MG/1
1 TABLET ORAL 2 TIMES DAILY
Qty: 28 TABLET | Refills: 0 | Status: SHIPPED | OUTPATIENT
Start: 2024-07-03 | End: 2024-07-03 | Stop reason: ALTCHOICE

## 2024-07-03 RX ORDER — METHOCARBAMOL 750 MG/1
750 TABLET, FILM COATED ORAL 3 TIMES DAILY
Qty: 30 TABLET | Refills: 0 | Status: SHIPPED | OUTPATIENT
Start: 2024-07-03 | End: 2024-07-03

## 2024-07-03 RX ORDER — METHOCARBAMOL 750 MG/1
750 TABLET, FILM COATED ORAL 3 TIMES DAILY
Qty: 90 TABLET | Refills: 0 | Status: SHIPPED | OUTPATIENT
Start: 2024-07-03 | End: 2024-08-02

## 2024-07-03 NOTE — TELEPHONE ENCOUNTER
Returned call to pharmacist. Confirmed that patient has been switched to doxycycline so there will be no interaction with Warfarin. Also confirmed Robaxin 90 tablet order has been received.   ----- Message from Latoya Ha sent at 7/3/2024 10:10 AM CDT -----  Regarding: Drug Interaction  Contact: Joslyn @ [phone]  Joslyn El is calling to inform Provider that that pt's sulfamethoxazole-trimethoprim 800-160mg (BACTRIM DS) 800-160 mg Tab is interacting with warsarin. Asking for  call back to discuss

## 2024-07-05 NOTE — PROGRESS NOTES
CHIEF COMPLAINT:  Chiari malformation s/p decompression (2-3 wks postop)    INTERVAL HISTORY (7/3/24):  Patient is now 2-3 weeks status post suboccipital craniectomy for decompression of Chiari malformation on 6/13/24.  She recovered remarkably well and was discharged on postop day 2.  She had good pain control and reported significant improvement in her headaches.  She has been doing well at home however reports that she has had some less severe headaches over the past 1-2 days with some imbalance and blurry vision.  She attributes this to anxiety as does her .  They report some scant drainage from the inferior aspect of the wound that is was 1st noticed yesterday.  She denies any fevers, chills, or any other signs of infection.  She denies any significant surgical site pain but acknowledges that her neck is stiff.  She did fine muscle relaxers helpful but has run out.    HPI:  Elva Burroughs is a 53 y.o.  female with below PMH including RIS and prior strokes/TIA for which she is on warfarin and ASA, who is referred to me by Dr Sutherland for evaluation of chiari malformation.  She reports headaches that are frontal and occipital in location.  Her headaches used to be daily but now occur from every other week to 1-2 times per week.  She finds that Tylenol and aspirin have been helpful.  She reports that coughing and bending over intensify her headaches.  She does not think that her headaches are positional however she finds that resting or lying down will help her headaches.    She reports imbalance and gait disturbance.    She also reports dizziness and blurry vision.  She has been evaluated by Ophthalmology who reports that she has 2020 vision and does not have any papilledema.  The source of her blurry vision remains unclear.      Whenever her recent MRIs demonstrated features of possible intracranial hypotension however she has no confirmed evidence of a CSF leak or any spinal trauma from which a leak  could have resulted.  She did faint when she had her stroke but never has injured her spine.    Review of patient's allergies indicates:   Allergen Reactions    Pcn [penicillins] Other (See Comments)     unknown       Past Medical History:   Diagnosis Date    Anemia     Anticoagulated on Coumadin     Deep vein thrombosis     DVT (deep venous thrombosis)     GERD (gastroesophageal reflux disease)     Headache     Hypertension     Pulmonary embolism      Past Surgical History:   Procedure Laterality Date    DECOMPRESSION OF CHIARI MALFORMATION BY REMOVAL OF POSTERIOR ARCH OF FIRST CERVICAL VERTEBRA Bilateral 6/13/2024    Procedure: DECOMPRESSION, CHIARI MALFORMATION, BY 1ST CERVICAL VERTEBRA POSTERIOR ARCH REMOVAL;  Surgeon: Juan Ramon León DO;  Location: [facility] OR [address] [place] [address];  Service: Neurosurgery;  Laterality: Bilateral;  (Chiari decompression with autologous duraplasty)  Axel mars    PULMONARY EMBOLISM SURGERY      TUBAL LIGATION       Family History   Problem Relation Name Age of Onset    Arthritis Mother      Cancer Mother      Depression Mother      Early death Mother      Heart disease Mother      Hypertension Mother      Mental illness Mother      Miscarriages / Stillbirths Mother       Social History     Tobacco Use    Smoking status: Never     Passive exposure: Never    Smokeless tobacco: Never   Substance Use Topics    Alcohol use: No    Drug use: No        Review of Systems   Constitutional: Negative.    HENT:  Negative for congestion, ear discharge, ear pain, hearing loss, nosebleeds, sinus pain and tinnitus.    Eyes:  Negative for blurred vision.   Respiratory: Negative.     Cardiovascular:  Negative for chest pain, palpitations, claudication and leg swelling.   Gastrointestinal:  Negative for abdominal pain, blood in stool, constipation, diarrhea, melena and vomiting.   Genitourinary:  Negative for flank pain, frequency and urgency.   Musculoskeletal:  Negative for falls.   Skin: Negative.     Neurological:  Negative for dizziness, tingling, tremors, sensory change, speech change, focal weakness, seizures, loss of consciousness, weakness and headaches.   Endo/Heme/Allergies:  Does not bruise/bleed easily.   Psychiatric/Behavioral:  The patient is nervous/anxious.        OBJECTIVE:   Vital Signs:  Temp: 98.3 °F (36.8 °C) (07/03/24 0916)  Pulse: (!) 115 (07/03/24 0916)  BP: (!) 145/95 (07/03/24 0916)    Physical Exam:  Constitutional: Patient sitting comfortably in chair. Appears well developed and well nourished.  Skin: Exposed areas are intact without abnormal markings, rashes or other lesions.  HEENT: Normocephalic. Normal conjunctivae.  Cardiovascular: Normal rate and regular rhythm.  Respiratory: Chest wall rises and falls symmetrically, without signs of respiratory distress.  Abdomen: Soft and non-tender.  Extremities: Warm and without edema. Calves supple, non-tender.  Psych/Behavior: Normal affect.    Neurological:    Mental status: Alert and oriented. Conversational and appropriate.       Cranial Nerves: VFF to confrontation. PERRL. EOMI without nystagmus. Facial STLT normal and symmetric. Strong, symmetric muscles of mastication. Facial strength full and symmetric. Hearing equal bilaterally to finger rub. Palate and uvula rise and fall normally in midline. Shoulder shrug 5/5 strength. Tongue midline.     Motor:    Upper:  Deltoids Triceps Biceps WE WF     R 5/5 5/5 5/5 5/5 5/5 5/5    L 5/5 5/5 5/5 5/5 5/5 5/5      Lower:  HF KE KF DF PF EHL    R 5/5 5/5 5/5 5/5 5/5 5/5    L 5/5 5/5 5/5 5/5 5/5 5/5     Sensory: Intact sensation to light touch in all extremities. Romberg negative.    Reflexes:          DTR: 2+ symmetrically throughout.     Barksdale's: Negative.     Babinski's: Negative.     Clonus: Negative.    Cerebellar: Finger-to-nose and rapid alternating movements normal.     Gait stable    Incision:  Well healed but small amount of creamy drainage expressed from inferior aspect of  incision while remove staples.  No erythema, swelling, or tenderness.    Diagnostic Results:  All imaging was independently reviewed by me.    MRI brain and cervical spine, dated 5/3 and 4/22/24:  1. Chiari 1 malformation w/ tonsillar descent approx 14mm  2. Crowing of FM  3. Cervical syrinx    Thoracic MRI, 4/22/24:  No evidence of CSF leak or dural defect    ASSESSMENT/PLAN:     Problem List Items Addressed This Visit          Neuro    Chiari malformation type I - Primary (Chronic)    Relevant Orders    CT Head Without Contrast     Other Visit Diagnoses       Status post craniectomy        Relevant Orders    CT Head Without Contrast          Patient is now 2-3 weeks status post Chiari decompression for large Chiari malformation.  She has recovered very well with excellent pain control and significant improvement in her Chiari type headaches however over the past 1-2 days she has had a slight return of her headaches combined with some imbalance and blurry vision.  She attributes this to being anxious as does her .  Her wound appears well healed and well approximated however with gentle pressure a small amount of creamy drainage was expressed while removing staples.  Other than that, the wound does not exhibit any erythema, tenderness, or swelling.  She has not exhibited any signs of infection including fever, chills, or changes in constitutional symptoms.  Out of caution I will start her on a 2 week course of doxycycline and see her back in 1 week to monitor the wound.    - recommend follow-up with PCP regarding tachycardia  - ordered doxycycline x2 weeks  - ordered head CT  - reordered Robaxin  A- return to clinic in 1 week for wound check      The patient understands and agrees with the plan of care. All questions were answered.    Time spent on this encounter: 30 minutes. This includes face-to-face time and non-face to face time preparing to see the patient (eg, review of tests), obtaining and/or  reviewing separately obtained history, documenting clinical information in the electronic or other health record, independently interpreting results and communicating results to the patient/family/caregiver, or care coordinator.          .

## 2024-07-10 ENCOUNTER — OFFICE VISIT (OUTPATIENT)
Dept: NEUROSURGERY | Facility: CLINIC | Age: 54
End: 2024-07-10
Payer: COMMERCIAL

## 2024-07-10 VITALS — HEART RATE: 98 BPM | SYSTOLIC BLOOD PRESSURE: 137 MMHG | DIASTOLIC BLOOD PRESSURE: 88 MMHG

## 2024-07-10 DIAGNOSIS — G93.5 CHIARI MALFORMATION TYPE I: Chronic | ICD-10-CM

## 2024-07-10 DIAGNOSIS — Z98.890 STATUS POST CRANIECTOMY: Primary | ICD-10-CM

## 2024-07-10 DIAGNOSIS — I82.532 CHRONIC DEEP VEIN THROMBOSIS (DVT) OF POPLITEAL VEIN OF LEFT LOWER EXTREMITY: Chronic | ICD-10-CM

## 2024-07-10 PROCEDURE — 3044F HG A1C LEVEL LT 7.0%: CPT | Mod: CPTII,S$GLB,, | Performed by: NEUROLOGICAL SURGERY

## 2024-07-10 PROCEDURE — 99999 PR PBB SHADOW E&M-EST. PATIENT-LVL III: CPT | Mod: PBBFAC,,, | Performed by: NEUROLOGICAL SURGERY

## 2024-07-10 PROCEDURE — 4010F ACE/ARB THERAPY RXD/TAKEN: CPT | Mod: CPTII,S$GLB,, | Performed by: NEUROLOGICAL SURGERY

## 2024-07-10 PROCEDURE — 3079F DIAST BP 80-89 MM HG: CPT | Mod: CPTII,S$GLB,, | Performed by: NEUROLOGICAL SURGERY

## 2024-07-10 PROCEDURE — 1160F RVW MEDS BY RX/DR IN RCRD: CPT | Mod: CPTII,S$GLB,, | Performed by: NEUROLOGICAL SURGERY

## 2024-07-10 PROCEDURE — 99024 POSTOP FOLLOW-UP VISIT: CPT | Mod: S$GLB,,, | Performed by: NEUROLOGICAL SURGERY

## 2024-07-10 PROCEDURE — 1159F MED LIST DOCD IN RCRD: CPT | Mod: CPTII,S$GLB,, | Performed by: NEUROLOGICAL SURGERY

## 2024-07-10 PROCEDURE — 3075F SYST BP GE 130 - 139MM HG: CPT | Mod: CPTII,S$GLB,, | Performed by: NEUROLOGICAL SURGERY

## 2024-07-10 NOTE — PROGRESS NOTES
CHIEF COMPLAINT:  Chiari malformation s/p decompression (4 wks postop)    INTERVAL HISTORY (7/10/24):  Patient is now 4 weeks postop.  She was requested to return to clinic for continued monitoring of her wound.  She was placed on doxycycline x2 weeks.  She presents with her  and both report that the wound drained a little bit for a couple days and then stopped.  Appears to have sealed itself and she does not report any further issues.  She has not had any return of her headaches and reports that her anxiety levels are decreased.    Reports right medial thigh pain that is similar to when she had DVT.  She is still taking lovenox injections and has not transitioned back to coumadin.  She still needs to contact her PCP and states she will today.    INTERVAL HISTORY (7/3/24):  Patient is now 2-3 weeks status post suboccipital craniectomy for decompression of Chiari malformation on 6/13/24.  She recovered remarkably well and was discharged on postop day 2.  She had good pain control and reported significant improvement in her headaches.  She has been doing well at home however reports that she has had some less severe headaches over the past 1-2 days with some imbalance and blurry vision.  She attributes this to anxiety as does her .  They report some scant drainage from the inferior aspect of the wound that is was 1st noticed yesterday.  She denies any fevers, chills, or any other signs of infection.  She denies any significant surgical site pain but acknowledges that her neck is stiff.  She did fine muscle relaxers helpful but has run out.    HPI:  Elva Burroughs is a 53 y.o.  female with below PMH including RIS and prior strokes/TIA for which she is on warfarin and ASA, who is referred to me by Dr Sutherland for evaluation of chiari malformation.  She reports headaches that are frontal and occipital in location.  Her headaches used to be daily but now occur from every other week to 1-2 times per week.   She finds that Tylenol and aspirin have been helpful.  She reports that coughing and bending over intensify her headaches.  She does not think that her headaches are positional however she finds that resting or lying down will help her headaches.    She reports imbalance and gait disturbance.    She also reports dizziness and blurry vision.  She has been evaluated by Ophthalmology who reports that she has 2020 vision and does not have any papilledema.  The source of her blurry vision remains unclear.      Whenever her recent MRIs demonstrated features of possible intracranial hypotension however she has no confirmed evidence of a CSF leak or any spinal trauma from which a leak could have resulted.  She did faint when she had her stroke but never has injured her spine.    Review of patient's allergies indicates:   Allergen Reactions    Pcn [penicillins] Other (See Comments)     unknown       Past Medical History:   Diagnosis Date    Anemia     Anticoagulated on Coumadin     Deep vein thrombosis     DVT (deep venous thrombosis)     GERD (gastroesophageal reflux disease)     Headache     Hypertension     Pulmonary embolism      Past Surgical History:   Procedure Laterality Date    DECOMPRESSION OF CHIARI MALFORMATION BY REMOVAL OF POSTERIOR ARCH OF FIRST CERVICAL VERTEBRA Bilateral 6/13/2024    Procedure: DECOMPRESSION, CHIARI MALFORMATION, BY 1ST CERVICAL VERTEBRA POSTERIOR ARCH REMOVAL;  Surgeon: Juan Ramon León DO;  Location: [facility] OR [address] [place] [address];  Service: Neurosurgery;  Laterality: Bilateral;  (Chiari decompression with autologous duraplasty)  Axel mars    PULMONARY EMBOLISM SURGERY      TUBAL LIGATION       Family History   Problem Relation Name Age of Onset    Arthritis Mother      Cancer Mother      Depression Mother      Early death Mother      Heart disease Mother      Hypertension Mother      Mental illness Mother      Miscarriages / Stillbirths Mother       Social History     Tobacco Use     Smoking status: Never     Passive exposure: Never    Smokeless tobacco: Never   Substance Use Topics    Alcohol use: No    Drug use: No        Review of Systems   Constitutional: Negative.    HENT:  Negative for congestion, ear discharge, ear pain, hearing loss, nosebleeds, sinus pain and tinnitus.    Eyes:  Negative for blurred vision.   Respiratory: Negative.     Cardiovascular:  Negative for chest pain, palpitations, claudication and leg swelling.   Gastrointestinal:  Negative for abdominal pain, blood in stool, constipation, diarrhea, melena and vomiting.   Genitourinary:  Negative for flank pain, frequency and urgency.   Musculoskeletal:  Negative for falls.   Skin: Negative.    Neurological:  Negative for dizziness, tingling, tremors, sensory change, speech change, focal weakness, seizures, loss of consciousness, weakness and headaches.   Endo/Heme/Allergies:  Does not bruise/bleed easily.   Psychiatric/Behavioral:  The patient is not nervous/anxious.        OBJECTIVE:   Vital Signs:  Pulse: 98 (07/10/24 0855)  BP: 137/88 (07/10/24 0855)    Physical Exam:  Constitutional: Patient sitting comfortably in chair. Appears well developed and well nourished.  Skin: Exposed areas are intact without abnormal markings, rashes or other lesions.  HEENT: Normocephalic. Normal conjunctivae.  Cardiovascular: Normal rate and regular rhythm.  Respiratory: Chest wall rises and falls symmetrically, without signs of respiratory distress.  Abdomen: Soft and non-tender.  Extremities: Warm and without edema. Calves supple, non-tender.  Psych/Behavior: Normal affect.    Neurological:    Mental status: Alert and oriented. Conversational and appropriate.       Cranial Nerves: VFF to confrontation. PERRL. EOMI without nystagmus. Facial STLT normal and symmetric. Strong, symmetric muscles of mastication. Facial strength full and symmetric. Hearing equal bilaterally to finger rub. Palate and uvula rise and fall normally in midline.  Shoulder shrug 5/5 strength. Tongue midline.     Motor:    Upper:  Deltoids Triceps Biceps WE WF     R 5/5 5/5 5/5 5/5 5/5 5/5    L 5/5 5/5 5/5 5/5 5/5 5/5      Lower:  HF KE KF DF PF EHL    R 5/5 5/5 5/5 5/5 5/5 5/5    L 5/5 5/5 5/5 5/5 5/5 5/5     Sensory: Intact sensation to light touch in all extremities. Romberg negative.    Reflexes:          DTR: 2+ symmetrically throughout.     Barksdale's: Negative.     Babinski's: Negative.     Clonus: Negative.    Cerebellar: Finger-to-nose and rapid alternating movements normal.     Gait stable    Incision:  Well healed.  No further evidence of drainage.    Diagnostic Results:  All imaging was independently reviewed by me.    HCT, 7/9/24:  Expected postop changes    MRI brain and cervical spine, dated 5/3 and 4/22/24:  1. Chiari 1 malformation w/ tonsillar descent approx 14mm  2. Crowing of FM  3. Cervical syrinx    Thoracic MRI, 4/22/24:  No evidence of CSF leak or dural defect    ASSESSMENT/PLAN:     Problem List Items Addressed This Visit          Neuro    Chiari malformation type I (Chronic)    Status post craniectomy - Primary       Hematology    Chronic deep vein thrombosis (DVT) of popliteal vein of left lower extremity (Chronic)    Relevant Orders    US Lower Extremity Veins Right     Patient is now 4 weeks status post Chiari decompression for large Chiari malformation.  She has recovered very well and continues to have excellent pain control with significant improvement in her Chiari type headaches.  He anxiety has decreased.  Her wound appears well healed without any further drainage - no other signs of infection.  She should complete her abx and will do virtual visit next week to confirm wound is remaining healed.    She reports right medial thigh pain (similar to when she had DVT).  She is still taking lovenox injections and has not transitioned back to coumadin.  She is planning to call PCP today.  I will order RLE u/s to r/o DVT given her prior  history.    - recommend follow-up with PCP regarding tachycardia and transition back to coumadin  - complete doxycycline  - ordered STAT RLE u/s and cont Lovenox  - reordered Robaxin  - VV in 1 week for wound re-check      The patient understands and agrees with the plan of care. All questions were answered.    Time spent on this encounter: 30 minutes. This includes face-to-face time and non-face to face time preparing to see the patient (eg, review of tests), obtaining and/or reviewing separately obtained history, documenting clinical information in the electronic or other health record, independently interpreting results and communicating results to the patient/family/caregiver, or care coordinator.          .

## 2024-07-11 PROBLEM — I82.401 ACUTE DEEP VEIN THROMBOSIS (DVT) OF RIGHT LOWER EXTREMITY: Status: ACTIVE | Noted: 2024-07-11

## 2024-07-13 ENCOUNTER — HOSPITAL ENCOUNTER (INPATIENT)
Facility: HOSPITAL | Age: 54
LOS: 1 days | Discharge: HOME | DRG: 920 | End: 2024-07-14
Attending: HOSPITALIST | Admitting: HOSPITALIST
Payer: COMMERCIAL

## 2024-07-13 DIAGNOSIS — R07.9 CHEST PAIN: ICD-10-CM

## 2024-07-13 DIAGNOSIS — I82.409 DVT (DEEP VENOUS THROMBOSIS): ICD-10-CM

## 2024-07-13 DIAGNOSIS — I82.411 ACUTE DEEP VEIN THROMBOSIS (DVT) OF FEMORAL VEIN OF RIGHT LOWER EXTREMITY: ICD-10-CM

## 2024-07-13 DIAGNOSIS — I82.401 ACUTE DEEP VEIN THROMBOSIS (DVT) OF RIGHT LOWER EXTREMITY, UNSPECIFIED VEIN: Primary | ICD-10-CM

## 2024-07-13 PROBLEM — Z79.01 CHRONIC ANTICOAGULATION: Chronic | Status: RESOLVED | Noted: 2018-05-17 | Resolved: 2024-07-13

## 2024-07-13 PROBLEM — H54.7 VISION LOSS: Chronic | Status: RESOLVED | Noted: 2023-10-18 | Resolved: 2024-07-13

## 2024-07-13 PROBLEM — D68.59 HYPERCOAGULABLE STATE: Chronic | Status: RESOLVED | Noted: 2022-06-02 | Resolved: 2024-07-13

## 2024-07-13 LAB
ALBUMIN SERPL BCP-MCNC: 2.5 G/DL (ref 3.5–5.2)
ALP SERPL-CCNC: 54 U/L (ref 55–135)
ALT SERPL W/O P-5'-P-CCNC: 8 U/L (ref 10–44)
ANION GAP SERPL CALC-SCNC: 8 MMOL/L (ref 8–16)
APTT PPP: 51.1 SEC (ref 21–32)
AST SERPL-CCNC: 13 U/L (ref 10–40)
BASOPHILS # BLD AUTO: 0.02 K/UL (ref 0–0.2)
BASOPHILS NFR BLD: 0.3 % (ref 0–1.9)
BILIRUB SERPL-MCNC: 0.3 MG/DL (ref 0.1–1)
BUN SERPL-MCNC: 6 MG/DL (ref 6–20)
CALCIUM SERPL-MCNC: 8.9 MG/DL (ref 8.7–10.5)
CHLORIDE SERPL-SCNC: 102 MMOL/L (ref 95–110)
CO2 SERPL-SCNC: 29 MMOL/L (ref 23–29)
CREAT SERPL-MCNC: 0.7 MG/DL (ref 0.5–1.4)
DIFFERENTIAL METHOD BLD: ABNORMAL
EOSINOPHIL # BLD AUTO: 0.1 K/UL (ref 0–0.5)
EOSINOPHIL NFR BLD: 1.8 % (ref 0–8)
ERYTHROCYTE [DISTWIDTH] IN BLOOD BY AUTOMATED COUNT: 13.7 % (ref 11.5–14.5)
EST. GFR  (NO RACE VARIABLE): >60 ML/MIN/1.73 M^2
GLUCOSE SERPL-MCNC: 93 MG/DL (ref 70–110)
HCT VFR BLD AUTO: 33.8 % (ref 37–48.5)
HGB BLD-MCNC: 10.6 G/DL (ref 12–16)
IMM GRANULOCYTES # BLD AUTO: 0.02 K/UL (ref 0–0.04)
IMM GRANULOCYTES NFR BLD AUTO: 0.3 % (ref 0–0.5)
INR PPP: 1.2 (ref 0.8–1.2)
LYMPHOCYTES # BLD AUTO: 2.3 K/UL (ref 1–4.8)
LYMPHOCYTES NFR BLD: 38.6 % (ref 18–48)
MAGNESIUM SERPL-MCNC: 1.8 MG/DL (ref 1.6–2.6)
MCH RBC QN AUTO: 26.7 PG (ref 27–31)
MCHC RBC AUTO-ENTMCNC: 31.4 G/DL (ref 32–36)
MCV RBC AUTO: 85 FL (ref 82–98)
MONOCYTES # BLD AUTO: 0.5 K/UL (ref 0.3–1)
MONOCYTES NFR BLD: 9 % (ref 4–15)
NEUTROPHILS # BLD AUTO: 3 K/UL (ref 1.8–7.7)
NEUTROPHILS NFR BLD: 50 % (ref 38–73)
NRBC BLD-RTO: 0 /100 WBC
PHOSPHATE SERPL-MCNC: 3.2 MG/DL (ref 2.7–4.5)
PLATELET # BLD AUTO: 345 K/UL (ref 150–450)
PMV BLD AUTO: 9.6 FL (ref 9.2–12.9)
POTASSIUM SERPL-SCNC: 3.3 MMOL/L (ref 3.5–5.1)
PROT SERPL-MCNC: 6.3 G/DL (ref 6–8.4)
PROTHROMBIN TIME: 13 SEC (ref 9–12.5)
RBC # BLD AUTO: 3.97 M/UL (ref 4–5.4)
SODIUM SERPL-SCNC: 139 MMOL/L (ref 136–145)
WBC # BLD AUTO: 5.98 K/UL (ref 3.9–12.7)

## 2024-07-13 PROCEDURE — 11000001 HC ACUTE MED/SURG PRIVATE ROOM

## 2024-07-13 PROCEDURE — 85730 THROMBOPLASTIN TIME PARTIAL: CPT

## 2024-07-13 PROCEDURE — 36415 COLL VENOUS BLD VENIPUNCTURE: CPT

## 2024-07-13 PROCEDURE — 84100 ASSAY OF PHOSPHORUS: CPT

## 2024-07-13 PROCEDURE — 63600175 PHARM REV CODE 636 W HCPCS

## 2024-07-13 PROCEDURE — 80053 COMPREHEN METABOLIC PANEL: CPT

## 2024-07-13 PROCEDURE — 25000003 PHARM REV CODE 250

## 2024-07-13 PROCEDURE — 85610 PROTHROMBIN TIME: CPT

## 2024-07-13 PROCEDURE — 85025 COMPLETE CBC W/AUTO DIFF WBC: CPT

## 2024-07-13 PROCEDURE — 83735 ASSAY OF MAGNESIUM: CPT

## 2024-07-13 RX ORDER — PROCHLORPERAZINE EDISYLATE 5 MG/ML
5 INJECTION INTRAMUSCULAR; INTRAVENOUS EVERY 6 HOURS PRN
Status: DISCONTINUED | OUTPATIENT
Start: 2024-07-13 | End: 2024-07-14 | Stop reason: HOSPADM

## 2024-07-13 RX ORDER — IBUPROFEN 200 MG
24 TABLET ORAL
Status: DISCONTINUED | OUTPATIENT
Start: 2024-07-13 | End: 2024-07-14 | Stop reason: HOSPADM

## 2024-07-13 RX ORDER — METOPROLOL SUCCINATE 25 MG/1
25 TABLET, EXTENDED RELEASE ORAL DAILY
Status: DISCONTINUED | OUTPATIENT
Start: 2024-07-13 | End: 2024-07-14 | Stop reason: HOSPADM

## 2024-07-13 RX ORDER — GLUCAGON 1 MG
1 KIT INJECTION
Status: DISCONTINUED | OUTPATIENT
Start: 2024-07-13 | End: 2024-07-14 | Stop reason: HOSPADM

## 2024-07-13 RX ORDER — LANOLIN ALCOHOL/MO/W.PET/CERES
1 CREAM (GRAM) TOPICAL DAILY
Status: DISCONTINUED | OUTPATIENT
Start: 2024-07-13 | End: 2024-07-14 | Stop reason: HOSPADM

## 2024-07-13 RX ORDER — IBUPROFEN 200 MG
16 TABLET ORAL
Status: DISCONTINUED | OUTPATIENT
Start: 2024-07-13 | End: 2024-07-14 | Stop reason: HOSPADM

## 2024-07-13 RX ORDER — POTASSIUM CHLORIDE 20 MEQ/1
40 TABLET, EXTENDED RELEASE ORAL ONCE
Status: COMPLETED | OUTPATIENT
Start: 2024-07-13 | End: 2024-07-13

## 2024-07-13 RX ORDER — SODIUM CHLORIDE 0.9 % (FLUSH) 0.9 %
10 SYRINGE (ML) INJECTION EVERY 12 HOURS PRN
Status: DISCONTINUED | OUTPATIENT
Start: 2024-07-13 | End: 2024-07-14 | Stop reason: HOSPADM

## 2024-07-13 RX ORDER — ACETAMINOPHEN 325 MG/1
650 TABLET ORAL EVERY 4 HOURS PRN
Status: DISCONTINUED | OUTPATIENT
Start: 2024-07-13 | End: 2024-07-14 | Stop reason: HOSPADM

## 2024-07-13 RX ORDER — NALOXONE HCL 0.4 MG/ML
0.02 VIAL (ML) INJECTION
Status: DISCONTINUED | OUTPATIENT
Start: 2024-07-13 | End: 2024-07-14 | Stop reason: HOSPADM

## 2024-07-13 RX ORDER — DOXYCYCLINE HYCLATE 100 MG
100 TABLET ORAL EVERY 12 HOURS
Status: DISCONTINUED | OUTPATIENT
Start: 2024-07-13 | End: 2024-07-14 | Stop reason: HOSPADM

## 2024-07-13 RX ORDER — FAMOTIDINE 20 MG/1
40 TABLET, FILM COATED ORAL DAILY
Status: DISCONTINUED | OUTPATIENT
Start: 2024-07-13 | End: 2024-07-14 | Stop reason: HOSPADM

## 2024-07-13 RX ORDER — METHOCARBAMOL 750 MG/1
750 TABLET, FILM COATED ORAL 3 TIMES DAILY PRN
Status: DISCONTINUED | OUTPATIENT
Start: 2024-07-13 | End: 2024-07-14 | Stop reason: HOSPADM

## 2024-07-13 RX ORDER — ASCORBIC ACID 250 MG
500 TABLET ORAL DAILY
Status: DISCONTINUED | OUTPATIENT
Start: 2024-07-13 | End: 2024-07-14 | Stop reason: HOSPADM

## 2024-07-13 RX ORDER — HEPARIN SODIUM,PORCINE/D5W 25000/250
0-40 INTRAVENOUS SOLUTION INTRAVENOUS CONTINUOUS
Status: DISCONTINUED | OUTPATIENT
Start: 2024-07-13 | End: 2024-07-14

## 2024-07-13 RX ADMIN — HEPARIN SODIUM AND DEXTROSE 13 UNITS/KG/HR: 10000; 5 INJECTION INTRAVENOUS at 03:07

## 2024-07-13 RX ADMIN — HEPARIN SODIUM AND DEXTROSE 13 UNITS/KG/HR: 10000; 5 INJECTION INTRAVENOUS at 05:07

## 2024-07-13 RX ADMIN — FERROUS SULFATE TAB EC 325 MG (65 MG FE EQUIVALENT) 1 EACH: 325 (65 FE) TABLET DELAYED RESPONSE at 08:07

## 2024-07-13 RX ADMIN — FAMOTIDINE 40 MG: 20 TABLET ORAL at 08:07

## 2024-07-13 RX ADMIN — METOPROLOL SUCCINATE 25 MG: 25 TABLET, EXTENDED RELEASE ORAL at 08:07

## 2024-07-13 RX ADMIN — POTASSIUM CHLORIDE 40 MEQ: 1500 TABLET, EXTENDED RELEASE ORAL at 01:07

## 2024-07-13 RX ADMIN — Medication 500 MG: at 08:07

## 2024-07-13 RX ADMIN — DOXYCYCLINE HYCLATE 100 MG: 100 TABLET, COATED ORAL at 08:07

## 2024-07-13 RX ADMIN — DOXYCYCLINE HYCLATE 100 MG: 100 TABLET, COATED ORAL at 09:07

## 2024-07-13 NOTE — HOSPITAL COURSE
Patient transferred from OSH for NSGY evaluation after noticing CSF leakage from the incision site and CTH showed some postop changes in midline of occiput and posterior arch of C1 with persistent air collection. (Recent decompression surgery on 6/13 By Dr. León at [facility] for chiari 1 malformation). Interventional cardiology consulted because patient was found to have extensive occlusive thrombus noted throughout the veins of the right lower extremity including the right common femoral vein, right superficial femoral vein, right popliteal vein, right peroneal veins, right anterior tibial veins, and right posterior tibial veins. Patient started on heparin gtt while inpatient and interventional cards determined there was no emergent need for acute intervention at this time. NSGY consulted and recommended no surgical intervention and to continue patient on doxycycline for completion of course and follow up with neurosurgery. On 7/14 patient was transitioned from heparin to P.O eliquis 10 mg BID for 7 days and then 5 mg BID indefinitely. Pt discharged with follow up with PCP, neurosurgery, and vascular surgery for management of CSF leakage and potential interventions for thrombus.

## 2024-07-13 NOTE — HPI
"Ms. Burroughs is a 53 year old female with a PMH of DVT (in the 90's) and PE (in 2015) supposed to be on lifelong AC (warfarin), Chiari 1 malformation s/p recent decompression surgery on 6/13/24, seizure, stroke, HTN, GERD, and DELVIS who is presenting as a transfer from [facility] for NSGY evaluation in the setting of reported serosanguinous fluid draining from post op scar. She initially  presented to [facility] due to an evaluation of a DVT. Patient was placed on DVT PPX with Lovenox after her procedure and was supposed to follow up with PCP on POD5 to transition back to Warfarin. Patient was unable to do so. During her recent post op visit with Dr. León 7/10/24 she reported pain her right leg similar to when she had a DVT. He ordered a STAT US of RLE. It revealed extensive occlusive thrombus noted throughout the veins of the right lower extremity. She was hospitalized and placed on a heparin gtt with plans to transition back to Warfarin. During her hospitalization, it was noted that she had leakage from post op wound. CT head revealed "Postsurgical changes Chiari decompression now evident with small fluid collection and emphysema superficial to the operative defect as above the fluid collection measuring approximately 2.3 cm". Patient reports that other than a dull ache in her right lower leg she feels well. She denies blurry vision, falls, seizures, confusion, CP, SOB, abdominal pain, n/v/d, bloody BM, and QUINTERO. She reports she was very happy after she had her procedure because neurologic symptoms such as blurry vision, and loss of balance went away. She will also need vascular vs. IC input regarding possible intervention for significant amount of thrombus.             "

## 2024-07-13 NOTE — ASSESSMENT & PLAN NOTE
Patient presenting as a transfer from [facility] for [facility] eval. Initially admitted due to RLE edema/pain. Found to have extensive clots in RLE on US. Patient did not transition to Warfarin after her recent surgery and was just using Lovenox DVT PPX. No signs of hemodynamic instability. Satting 100 % on RA. No tachycardia noted. No SOB or CP. Patient reports RLE pain improved.     PLAN:  - On high intensity heparin gtt  - Interventional cardiology consult for possible intervention   - NPO   - If no planned intervention bridge to Warfarin   - Daily CBC, CMP, Mg, Phos  - Pulse Oximeter continuous   - If signs of Hemodynamic instability STAT page IC and retrieve imaging

## 2024-07-13 NOTE — ASSESSMENT & PLAN NOTE
Patient had chiari malformation s/p decompression 6/13/24. Followed by neurosurgery at [facility]. She reports neurologic symptoms such as blurry vision and loss of balance resolved since the procedure. Noted serosanguinous discharge at OSH near post op site. Transferred for NSGY evaluation.        PLAN:  - NSGY consulted  - NPO   - Neuro checks Q4  - Fall/seizure precautions  - Continue with Robaxin TID prn  - Continue with 2 week course of Doxycycline prescribed by NSGY. No signs of systemic infection

## 2024-07-13 NOTE — PROGRESS NOTES
Patient Transferred to NPU Room 958 at 0300.       Upon arrival to the floor, patient greeted and oriented to room. Complete head to toe assessment completed per protocol. VSS, see flowsheet for details. Neuro assessment completed. Primary team notified of patient's transfer to floor. All current and transfer orders reviewed/reconciled per protocol. All emergency equipment set up in patient's room. Fall/seizure precautions initiated per providers orders. 4 Eyes skin assessment performed, see below for details. Reviewed assessment and rounding frequency with patient and family. Questions were encouraged and addressed. Repositioned patient for comfort with bed locked in lowest position, side rails up x 3  , bed alarm set, and call light within reach. Instructed patient to call staff for mobility/assistance, verbalized understanding. No acute signs of distress noted at this time.Patient arrived with personal belongings.  at bedside.    Nurses Note -- 4 Eyes      7/13/2024   0300      Skin assessed during: Admit      [] No Altered Skin Integrity Present    []Prevention Measures Documented      [] Yes- Altered Skin Integrity Present or Discovered   [] LDA Added if Not in Epic (Describe Wound)   [x] New Altered Skin Integrity was Present on Admit and Documented in LDA   [] Wound Image Taken    Wound Care Consulted? No    Attending Nurse:  ROSENDA Huitron     Second RN/Staff Member:   ROSENDA Carl

## 2024-07-13 NOTE — MEDICAL/APP STUDENT
[facility] Medicine Student   Progress Note  [facility] MED 1    Admit Date: 7/13/2024  Hospital Day: 0  07/13/2024  11:32 AM    SUBJECTIVE:   Ms. Elva Burroughs is a 53 y.o. 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    Interval history: NAEON. VSS. Pt denies any CP, SOB, leg pain, palpitations, headaches, vision changes. On High-Intensity Heparin gtt.    Review of Systems   Constitutional:  Negative for chills, diaphoresis and fever.   HENT:  Negative for sore throat.    Eyes:  Negative for blurred vision and double vision.   Respiratory:  Negative for cough and shortness of breath.    Cardiovascular:  Negative for chest pain and palpitations.   Gastrointestinal:  Negative for abdominal pain, diarrhea, nausea and vomiting.   Genitourinary:  Negative for dysuria, frequency and urgency.   Musculoskeletal:  Negative for neck pain.   Neurological:  Negative for dizziness, tremors and headaches.       Please refer to the H&P for past medical, family, and social history.    OBJECTIVE:     Vital Signs Recent:  Temp: 98.2 °F (36.8 °C) (07/13/24 0741)  Pulse: 88 (07/13/24 0741)  Resp: 18 (07/13/24 0741)  BP: 138/70 (07/13/24 0741)  SpO2: 97 % (07/13/24 0741)  Oxygen Documentation:                Device (Oxygen Therapy): room air         Vital Signs Range (Last 24H):  Temp:  [97.5 °F (36.4 °C)-99.2 °F (37.3 °C)]   Pulse:  [80-95]   Resp:  [16-20]   BP: (118-152)/(70-85)   SpO2:  [97 %-98 %]        I & O (Last 24H):  Intake/Output Summary (Last 24 hours) at 7/13/2024 1132  Last data filed at 7/13/2024 0300  Gross per 24 hour   Intake --   Output 300 ml   Net -300 ml        Physical Exam:  Physical Exam  Constitutional:       Appearance: Normal appearance. She is not ill-appearing.   HENT:      Head: Normocephalic and  atraumatic.      Comments: Surgical scar in occiput. No visible drainage. No tenderness.  Eyes:      Extraocular Movements: Extraocular movements intact.      Pupils: Pupils are equal, round, and reactive to light.   Cardiovascular:      Rate and Rhythm: Normal rate and regular rhythm.      Pulses: Normal pulses.      Heart sounds: Normal heart sounds.   Pulmonary:      Effort: Pulmonary effort is normal.      Breath sounds: Normal breath sounds. No wheezing or rhonchi.   Abdominal:      General: Abdomen is flat. There is no distension.      Palpations: Abdomen is soft.      Tenderness: There is no guarding or rebound.   Musculoskeletal:         General: No tenderness.      Cervical back: Neck supple. No tenderness.      Right lower leg: Edema present.      Comments: R LE Swollen and warm compared to L LE. Pt denies any pain   Skin:     General: Skin is warm.      Capillary Refill: Capillary refill takes less than 2 seconds.   Neurological:      General: No focal deficit present.      Mental Status: She is alert and oriented to person, place, and time.   Psychiatric:         Mood and Affect: Mood normal.         Behavior: Behavior normal.         Labs:   Recent Labs   Lab 07/11/24  1057 07/11/24  1520 07/13/24  0704    141 139   K 3.4* 2.9* 3.3*    102 102   CO2 32* 30* 29   BUN 10 8 6   CREATININE 0.73 0.8 0.7    95 93   CALCIUM 9.2 9.5 8.9   MG  --   --  1.8   PHOS  --   --  3.2     Recent Labs   Lab 07/11/24  1057 07/11/24  1520 07/12/24  0548 07/13/24  0704   ALKPHOS 64 58  --  54*   ALT 17 11  --  8*   AST 27 15  --  13   ALBUMIN 3.2* 2.9*  --  2.5*   PROT 6.9 7.4  --  6.3   BILITOT 0.3 0.4  --  0.3   INR  --  1.2 1.2 1.2     Recent Labs   Lab 07/11/24  1520 07/12/24  0548 07/13/24  0704   WBC 6.67 6.18 5.98   HGB 11.3* 10.6* 10.6*   HCT 36.5* 34.5* 33.8*    287 345       Diagnostic Results:      Scheduled Meds:   ascorbic acid (vitamin C)  500 mg Oral Daily    doxycycline  100 mg Oral  Q12H    famotidine  40 mg Oral Daily    ferrous sulfate  1 tablet Oral Daily    metoprolol succinate  25 mg Oral Daily    potassium chloride  40 mEq Oral Once     Continuous Infusions:   heparin (porcine) in D5W  0-40 Units/kg/hr (Adjusted) Intravenous Continuous 10 mL/hr at 07/13/24 0345 13 Units/kg/hr at 07/13/24 0345     PRN Meds:  Current Facility-Administered Medications:     acetaminophen, 650 mg, Oral, Q4H PRN    dextrose 10%, 12.5 g, Intravenous, PRN    dextrose 10%, 25 g, Intravenous, PRN    glucagon (human recombinant), 1 mg, Intramuscular, PRN    glucose, 16 g, Oral, PRN    glucose, 24 g, Oral, PRN    heparin (PORCINE), 60 Units/kg (Adjusted), Intravenous, PRN    heparin (PORCINE), 30 Units/kg (Adjusted), Intravenous, PRN    methocarbamoL, 750 mg, Oral, TID PRN    naloxone, 0.02 mg, Intravenous, PRN    prochlorperazine, 5 mg, Intravenous, Q6H PRN    sodium chloride 0.9%, 10 mL, Intravenous, Q12H PRN    ASSESSMENT/PLAN:   Ms. Elva Burroughs is a 53 y.o. 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    Active Hospital Problems    Diagnosis  POA    *Acute deep vein thrombosis (DVT) of right lower extremity [I82.401]  Yes    Acid reflux [K21.9]  Yes    Absence seizure [G40.A09]  Yes     Chronic     Followed by Neurology      Iron deficiency anemia secondary to inadequate dietary iron intake [D50.8]  Yes    Chiari malformation type I [G93.5]  Yes     Chronic    Obstructive sleep apnea (adult) (pediatric) [G47.33]  Yes     Compliant with CPAP machine    Uses it approximately 6-8 hours nightly    Wakes feeling rested  Continue CPAP as prescribed      Essential hypertension [I10]  Yes     Chronic     Patient was asked to monitor BP at home or work, several times per month and return with written values at  next office visit.  Low-salt diet  Compliance with medications  Monitor for end-organ damage          Resolved Hospital Problems    Diagnosis Date Resolved POA    Hypercoagulable state [D68.59] 07/13/2024 Yes     Chronic     Reduced protein C and protein S activity      Chronic anticoagulation [Z79.01] 07/13/2024 Not Applicable     Chronic     Continue current dose of Warfarin  Monitor INR routinely  No evidence of bleeding         1.DVT of Right Lower Extremity  ASSESSMENT:  Hx of DVT (90s) and PE (2015). On lifelong heparin. After recent neurosurgery, was placed on Lovenox 40mg and was supposed to f/u with PCP on POD5 to switch back to warfarin but missed the appt. 7/10 c/o R calf and thigh pain similar to last DVT. U/S LE Veins found extensive occlusive thrombus in right LE veins. Started on Hep gtt in [facility] on 7/11 with plan to bridge to warfarin. Hep gtt continued upon transfer to [facility] for NSGY eval. Pt denies any leg pain or PE symptoms.  PLAN:  -Continue high-intensity heparin gtt  -Interventional cardiology consulted for potential thrombectomy. Pending recs   -NPO for possible interventions  -Plan is to bridge back to warfarin. Will chart review to see if DOACs ever considered  -Monitor for hemodynamic instability  -Daily CBC/CMP/aPTT    2. Chiari Malformation Type 1 s/p decompression  ASSESSMENT: Chiari Malformation Type 1 s/p decompression surgery (6/15/24). Had sero-sanguinous drainage from wound after episode of vomiting. CT Head showed persistent air/fluid collection at post-op defect. Transferred to [facility] for NSGY eval. Was started on doxycycline 100mg PO on 7/3 for 2 weeks for surgical ppx.   PLAN:  -NSGY consulted. Pending recs  -NPO for possible intervention. Will advance to cardiac diet after  -Continue Doxycycline PO 100mg (end 7/18)  -Neurochecks q4h  -Fall and seizure ppx    3. HTN  ASSESSMENT: On toprol 25mg PO and nifedipine 60mg PO at home. Was receiving toprol 25mg PO at [facility]  hospital/  PLAN:  -Continue toprol 25mg PO  -Monitor BP    4. DVT Prophylaxis  -On high-intensity heparin gtt    Discharge planning:   Pt still pending recommendations from NSGY and Interventional Cardiology. Will reassess dispo status after recs. Currently plan is to discharge home.

## 2024-07-13 NOTE — H&P
"  [facility] Neurosurgery ([facility] Medicine  History & Physical    Patient Name: Elav Burroughs  MRN: [id number]  Patient Class: IP- Inpatient  Admission Date: 7/13/2024  Attending Physician: Solis Thomas MD   Primary Care Provider: Zion Almaraz FNP         Patient information was obtained from patient, past medical records, and ER records.     Subjective:     Principal Problem:Acute deep vein thrombosis (DVT) of right lower extremity    Chief Complaint: No chief complaint on file.       HPI: Ms. Burroughs is a 53 year old female with a PMH of DVT (in the 90's) and PE (in 2015) supposed to be on lifelong AC (warfarin), Chiari 1 malformation s/p recent decompression surgery on 6/13/24, seizure, stroke, HTN, GERD, and DELVIS who is presenting as a transfer from [facility] for NSGY evaluation in the setting of reported serosanguinous fluid draining from post op scar. She initially  presented to [facility] due to an evaluation of a DVT. Patient was placed on DVT PPX with Lovenox after her procedure and was supposed to follow up with PCP on POD5 to transition back to Warfarin. Patient was unable to do so. During her recent post op visit with Dr. León 7/10/24 she reported pain her right leg similar to when she had a DVT. He ordered a STAT US of RLE. It revealed extensive occlusive thrombus noted throughout the veins of the right lower extremity. She was hospitalized and placed on a heparin gtt with plans to transition back to Warfarin. During her hospitalization, it was noted that she had leakage from post op wound. CT head revealed "Postsurgical changes Chiari decompression now evident with small fluid collection and emphysema superficial to the operative defect as above the fluid collection measuring approximately 2.3 cm". Patient reports that other than a dull ache in her right lower leg she feels well. She denies blurry vision, falls, seizures, confusion, CP, SOB, abdominal pain, " n/v/d, bloody BM, and QUINTERO. She reports she was very happy after she had her procedure because neurologic symptoms such as blurry vision, and loss of balance went away. She will also need vascular vs. IC input regarding possible intervention for significant amount of thrombus.               Past Medical History:   Diagnosis Date    Anemia     Anticoagulated on Coumadin     Deep vein thrombosis     DVT (deep venous thrombosis)     GERD (gastroesophageal reflux disease)     Headache     Hypertension     Pulmonary embolism        Past Surgical History:   Procedure Laterality Date    DECOMPRESSION OF CHIARI MALFORMATION BY REMOVAL OF POSTERIOR ARCH OF FIRST CERVICAL VERTEBRA Bilateral 6/13/2024    Procedure: DECOMPRESSION, CHIARI MALFORMATION, BY 1ST CERVICAL VERTEBRA POSTERIOR ARCH REMOVAL;  Surgeon: Juan Ramon León DO;  Location: [facility] OR [address] [place] [address];  Service: Neurosurgery;  Laterality: Bilateral;  (Chiari decompression with autologous duraplasty)  Axel mars    PULMONARY EMBOLISM SURGERY      TUBAL LIGATION         Review of patient's allergies indicates:   Allergen Reactions    Pcn [penicillins] Other (See Comments)     unknown       Current Facility-Administered Medications on File Prior to Encounter   Medication    [DISCONTINUED] acetaminophen tablet 650 mg    [DISCONTINUED] dextrose 10% bolus 125 mL 125 mL    [DISCONTINUED] dextrose 10% bolus 250 mL 250 mL    [DISCONTINUED] doxycycline tablet 100 mg    [DISCONTINUED] glucagon (human recombinant) injection 1 mg    [DISCONTINUED] glucose chewable tablet 16 g    [DISCONTINUED] glucose chewable tablet 24 g    [DISCONTINUED] heparin 25,000 units in dextrose 5% (100 units/ml) IV bolus from bag HIGH INTENSITY nomogram - [facility]    [DISCONTINUED] heparin 25,000 units in dextrose 5% (100 units/ml) IV bolus from bag HIGH INTENSITY nomogram - [facility]    [DISCONTINUED] heparin 25,000 units in dextrose 5% 250 mL (100 units/mL) infusion HIGH INTENSITY nomogram - [facility]     [DISCONTINUED] metoprolol succinate (TOPROL-XL) 24 hr tablet 25 mg    [DISCONTINUED] naloxone 0.4 mg/mL injection 0.02 mg    [DISCONTINUED] ondansetron disintegrating tablet 8 mg    [DISCONTINUED] ondansetron tablet 8 mg    [DISCONTINUED] sodium chloride 0.9% flush 10 mL     Current Outpatient Medications on File Prior to Encounter   Medication Sig    doxycycline (VIBRAMYCIN) 100 MG Cap Take 1 capsule (100 mg total) by mouth every 12 (twelve) hours.    famotidine (PEPCID) 40 MG tablet Take 1 tablet (40 mg total) by mouth once daily.    ferrous sulfate (IRON) 325 mg (65 mg iron) Tab tablet Take 325 mg by mouth once daily.    methocarbamoL (ROBAXIN) 750 MG Tab Take 1 tablet (750 mg total) by mouth 3 (three) times daily.    metoprolol succinate (TOPROL-XL) 25 MG 24 hr tablet Take 1 tablet (25 mg total) by mouth once daily.    NIFEdipine (PROCARDIA-XL) 60 MG (OSM) 24 hr tablet Take 1 tablet (60 mg total) by mouth once daily.    acetaminophen (TYLENOL) 500 MG tablet Take 1,000 mg by mouth daily as needed for Pain.    ascorbic acid, vitamin C, (VITAMIN C) 500 MG tablet Take 500 mg by mouth once daily.    lancets (COAGUCHEK LANCETS) Misc 1 each by Misc.(Non-Drug; Combo Route) route once a week.    oxyCODONE-acetaminophen (PERCOCET) 5-325 mg per tablet Take 1 tablet by mouth every 4 (four) hours as needed for Pain.    prothrombin time/INR test metr Misc 1 Device by Misc.(Non-Drug; Combo Route) route once a week.     Family History       Problem Relation (Age of Onset)    Arthritis Mother    Cancer Mother    Depression Mother    Early death Mother    Heart disease Mother    Hypertension Mother    Mental illness Mother    Miscarriages / Stillbirths Mother          Tobacco Use    Smoking status: Never     Passive exposure: Never    Smokeless tobacco: Never   Substance and Sexual Activity    Alcohol use: No    Drug use: No    Sexual activity: Yes     Partners: Male     Review of Systems   Constitutional:  Positive for activity  change. Negative for chills and fever.   HENT:  Negative for sinus pressure, sinus pain and trouble swallowing.    Eyes:  Negative for visual disturbance.   Respiratory:  Negative for cough, chest tightness, shortness of breath and wheezing.    Cardiovascular:  Positive for leg swelling. Negative for chest pain and palpitations.   Gastrointestinal:  Negative for abdominal pain, blood in stool, diarrhea, nausea and vomiting.   Genitourinary:  Negative for dysuria and flank pain.   Neurological:  Negative for dizziness, syncope, weakness, light-headedness and headaches.   Psychiatric/Behavioral:  Negative for confusion.      Objective:     Vital Signs (Most Recent):  Temp: 97.8 °F (36.6 °C) (07/13/24 0311)  Pulse: 80 (07/13/24 0311)  Resp: 18 (07/13/24 0311)  BP: (!) 147/85 (07/13/24 0311)  SpO2: 97 % (07/13/24 0311) Vital Signs (24h Range):  Temp:  [96.9 °F (36.1 °C)-99.2 °F (37.3 °C)] 97.8 °F (36.6 °C)  Pulse:  [80-95] 80  Resp:  [18-20] 18  SpO2:  [96 %-98 %] 97 %  BP: (118-152)/(77-85) 147/85     Weight: 100.3 kg (221 lb 1.9 oz)  Body mass index is 34.63 kg/m².     Physical Exam  Constitutional:       Appearance: Normal appearance. She is obese.   HENT:      Head:      Comments: Noted post op scars on Occiput. Noted scabbing from post op site on superior portion of scar. No signs of active draining.      Nose: Nose normal.      Mouth/Throat:      Mouth: Mucous membranes are moist.   Eyes:      Extraocular Movements: Extraocular movements intact.   Cardiovascular:      Rate and Rhythm: Normal rate and regular rhythm.      Pulses: Normal pulses.      Heart sounds: Normal heart sounds. No murmur heard.  Pulmonary:      Effort: Pulmonary effort is normal. No respiratory distress.      Breath sounds: Normal breath sounds. No wheezing.   Abdominal:      General: Bowel sounds are normal. There is no distension.      Palpations: Abdomen is soft.      Tenderness: There is no abdominal tenderness.   Musculoskeletal:          General: Swelling and tenderness present.      Right lower leg: No edema.      Left lower leg: No edema.   Skin:     General: Skin is warm.   Neurological:      General: No focal deficit present.      Mental Status: She is alert and oriented to person, place, and time.   Psychiatric:         Mood and Affect: Mood normal.         Behavior: Behavior normal.                Significant Labs: All pertinent labs within the past 24 hours have been reviewed.    Significant Imaging: I have reviewed all pertinent imaging results/findings within the past 24 hours.  Assessment/Plan:     * Acute deep vein thrombosis (DVT) of right lower extremity  Patient presenting as a transfer from [facility] for [facility]. Initially admitted due to RLE edema/pain. Found to have extensive clots in RLE on US. Patient did not transition to Warfarin after her recent surgery and was just using Lovenox DVT PPX. No signs of hemodynamic instability. Satting 100 % on RA. No tachycardia noted. No SOB or CP. Patient reports RLE pain improved.     PLAN:  - On high intensity heparin gtt  - Interventional cardiology consult for possible intervention   - NPO   - If no planned intervention bridge to Warfarin   - Daily CBC, CMP, Mg, Phos  - Pulse Oximeter continuous   - If signs of Hemodynamic instability STAT page IC and retrieve imaging      Acid reflux  Continue home medication       Absence seizure  No episodes of seizures in a while patient reports.     PLAN:  -Seizure precautions       Iron deficiency anemia secondary to inadequate dietary iron intake  Patient's anemia is currently stable. Has not received any PRBCs to date. Etiology likely d/t Iron deficiency  Current CBC reviewed-   Lab Results   Component Value Date    HGB 10.6 (L) 07/12/2024    HCT 34.5 (L) 07/12/2024     Monitor serial CBC and transfuse if patient becomes hemodynamically unstable, symptomatic or H/H drops below 7/21.    Chiari malformation type I  Patient had chiari malformation s/p  decompression 6/13/24. Followed by neurosurgery at [facility]. She reports neurologic symptoms such as blurry vision and loss of balance resolved since the procedure. Noted serosanguinous discharge at OSH near post op site. Transferred for NSGY evaluation.        PLAN:  - NSGY consulted  - NPO   - Neuro checks Q4  - Fall/seizure precautions  - Continue with Robaxin TID prn  - Continue with 2 week course of Doxycycline prescribed by NSGY. No signs of systemic infection     Obstructive sleep apnea (adult) (pediatric)  CPAP QHS     Essential hypertension  Chronic, controlled. Latest blood pressure and vitals reviewed-     Temp:  [96.9 °F (36.1 °C)-99.2 °F (37.3 °C)]   Pulse:  [80-95]   Resp:  [18-20]   BP: (118-152)/(77-85)   SpO2:  [96 %-98 %] .   Home meds for hypertension were reviewed and noted below.   Hypertension Medications               metoprolol succinate (TOPROL-XL) 25 MG 24 hr tablet Take 1 tablet (25 mg total) by mouth once daily.    NIFEdipine (PROCARDIA-XL) 60 MG (OSM) 24 hr tablet Take 1 tablet (60 mg total) by mouth once daily.            While in the hospital, will manage blood pressure as follows; Adjust home antihypertensive regimen as follows- Patient reports Nifedipine has been held since her procedure. She was receiving Metoprolol at OSH and it has not been removed. Will continue it here     Will utilize p.r.n. blood pressure medication only if patient's blood pressure greater than 180/110 and she develops symptoms such as worsening chest pain or shortness of breath.      VTE Risk Mitigation (From admission, onward)           Ordered     heparin 25,000 units in dextrose 5% (100 units/ml) IV bolus from bag HIGH INTENSITY nomogram - [facility]  As needed (PRN)        Question:  Heparin Infusion Adjustment (DO NOT MODIFY ANSWER)  Answer:  [email or web address]\Images\Pharmacy\HeparinInfusions\heparin HIGH INTENSITY nomogram for [facility] [id number]    07/13/24 0334     heparin 25,000 units in dextrose 5% (100 units/ml)  IV bolus from bag HIGH INTENSITY nomogram - [facility]  As needed (PRN)        Question:  Heparin Infusion Adjustment (DO NOT MODIFY ANSWER)  Answer:  [email or web address]\Images\Pharmacy\HeparinInfusions\heparin HIGH INTENSITY nomogram for [facility] [id number]    07/13/24 0334     heparin 25,000 units in dextrose 5% 250 mL (100 units/mL) infusion HIGH INTENSITY nomogram - [facility]  Continuous        Question:  Begin at (units/kg/hr)  Answer:  18    07/13/24 0334     IP VTE HIGH RISK PATIENT  Once         07/13/24 0330     Place sequential compression device  Until discontinued         07/13/24 0330     Reason for No Pharmacological VTE Prophylaxis  Once        Question:  Reasons:  Answer:  Physician Provided (leave comment)  Comment:  She will be on a high intesity heparin gtt    07/13/24 0330                               Received report, awaiting for patient's arrival    David Franklin DO  Department of Hospital Medicine  [facility] Neurosurgery ([facility])

## 2024-07-13 NOTE — SUBJECTIVE & OBJECTIVE
Past Medical History:   Diagnosis Date    Anemia     Anticoagulated on Coumadin     Deep vein thrombosis     DVT (deep venous thrombosis)     GERD (gastroesophageal reflux disease)     Headache     Hypertension     Pulmonary embolism        Past Surgical History:   Procedure Laterality Date    DECOMPRESSION OF CHIARI MALFORMATION BY REMOVAL OF POSTERIOR ARCH OF FIRST CERVICAL VERTEBRA Bilateral 6/13/2024    Procedure: DECOMPRESSION, CHIARI MALFORMATION, BY 1ST CERVICAL VERTEBRA POSTERIOR ARCH REMOVAL;  Surgeon: Juan Ramon León DO;  Location: [facility] OR [address] [place] [address];  Service: Neurosurgery;  Laterality: Bilateral;  (Chiari decompression with autologous duraplasty)  Axel mars    PULMONARY EMBOLISM SURGERY      TUBAL LIGATION         Review of patient's allergies indicates:   Allergen Reactions    Pcn [penicillins] Other (See Comments)     unknown       Current Facility-Administered Medications on File Prior to Encounter   Medication    [DISCONTINUED] acetaminophen tablet 650 mg    [DISCONTINUED] dextrose 10% bolus 125 mL 125 mL    [DISCONTINUED] dextrose 10% bolus 250 mL 250 mL    [DISCONTINUED] doxycycline tablet 100 mg    [DISCONTINUED] glucagon (human recombinant) injection 1 mg    [DISCONTINUED] glucose chewable tablet 16 g    [DISCONTINUED] glucose chewable tablet 24 g    [DISCONTINUED] heparin 25,000 units in dextrose 5% (100 units/ml) IV bolus from bag HIGH INTENSITY nomogram - [facility]    [DISCONTINUED] heparin 25,000 units in dextrose 5% (100 units/ml) IV bolus from bag HIGH INTENSITY nomogram - [facility]    [DISCONTINUED] heparin 25,000 units in dextrose 5% 250 mL (100 units/mL) infusion HIGH INTENSITY nomogram - [facility]    [DISCONTINUED] metoprolol succinate (TOPROL-XL) 24 hr tablet 25 mg    [DISCONTINUED] naloxone 0.4 mg/mL injection 0.02 mg    [DISCONTINUED] ondansetron disintegrating tablet 8 mg    [DISCONTINUED] ondansetron tablet 8 mg    [DISCONTINUED] sodium chloride 0.9% flush 10 mL     Current Outpatient  Medications on File Prior to Encounter   Medication Sig    doxycycline (VIBRAMYCIN) 100 MG Cap Take 1 capsule (100 mg total) by mouth every 12 (twelve) hours.    famotidine (PEPCID) 40 MG tablet Take 1 tablet (40 mg total) by mouth once daily.    ferrous sulfate (IRON) 325 mg (65 mg iron) Tab tablet Take 325 mg by mouth once daily.    methocarbamoL (ROBAXIN) 750 MG Tab Take 1 tablet (750 mg total) by mouth 3 (three) times daily.    metoprolol succinate (TOPROL-XL) 25 MG 24 hr tablet Take 1 tablet (25 mg total) by mouth once daily.    NIFEdipine (PROCARDIA-XL) 60 MG (OSM) 24 hr tablet Take 1 tablet (60 mg total) by mouth once daily.    acetaminophen (TYLENOL) 500 MG tablet Take 1,000 mg by mouth daily as needed for Pain.    ascorbic acid, vitamin C, (VITAMIN C) 500 MG tablet Take 500 mg by mouth once daily.    lancets (COAGUCHEK LANCETS) Misc 1 each by Misc.(Non-Drug; Combo Route) route once a week.    oxyCODONE-acetaminophen (PERCOCET) 5-325 mg per tablet Take 1 tablet by mouth every 4 (four) hours as needed for Pain.    prothrombin time/INR test metr Misc 1 Device by Misc.(Non-Drug; Combo Route) route once a week.     Family History       Problem Relation (Age of Onset)    Arthritis Mother    Cancer Mother    Depression Mother    Early death Mother    Heart disease Mother    Hypertension Mother    Mental illness Mother    Miscarriages / Stillbirths Mother          Tobacco Use    Smoking status: Never     Passive exposure: Never    Smokeless tobacco: Never   Substance and Sexual Activity    Alcohol use: No    Drug use: No    Sexual activity: Yes     Partners: Male     Review of Systems   Constitutional:  Positive for activity change. Negative for chills and fever.   HENT:  Negative for sinus pressure, sinus pain and trouble swallowing.    Eyes:  Negative for visual disturbance.   Respiratory:  Negative for cough, chest tightness, shortness of breath and wheezing.    Cardiovascular:  Positive for leg swelling.  Negative for chest pain and palpitations.   Gastrointestinal:  Negative for abdominal pain, blood in stool, diarrhea, nausea and vomiting.   Genitourinary:  Negative for dysuria and flank pain.   Neurological:  Negative for dizziness, syncope, weakness, light-headedness and headaches.   Psychiatric/Behavioral:  Negative for confusion.      Objective:     Vital Signs (Most Recent):  Temp: 97.8 °F (36.6 °C) (07/13/24 0311)  Pulse: 80 (07/13/24 0311)  Resp: 18 (07/13/24 0311)  BP: (!) 147/85 (07/13/24 0311)  SpO2: 97 % (07/13/24 0311) Vital Signs (24h Range):  Temp:  [96.9 °F (36.1 °C)-99.2 °F (37.3 °C)] 97.8 °F (36.6 °C)  Pulse:  [80-95] 80  Resp:  [18-20] 18  SpO2:  [96 %-98 %] 97 %  BP: (118-152)/(77-85) 147/85     Weight: 100.3 kg (221 lb 1.9 oz)  Body mass index is 34.63 kg/m².     Physical Exam  Constitutional:       Appearance: Normal appearance. She is obese.   HENT:      Head:      Comments: Noted post op scars on Occiput. Noted scabbing from post op site on superior portion of scar. No signs of active draining.      Nose: Nose normal.      Mouth/Throat:      Mouth: Mucous membranes are moist.   Eyes:      Extraocular Movements: Extraocular movements intact.   Cardiovascular:      Rate and Rhythm: Normal rate and regular rhythm.      Pulses: Normal pulses.      Heart sounds: Normal heart sounds. No murmur heard.  Pulmonary:      Effort: Pulmonary effort is normal. No respiratory distress.      Breath sounds: Normal breath sounds. No wheezing.   Abdominal:      General: Bowel sounds are normal. There is no distension.      Palpations: Abdomen is soft.      Tenderness: There is no abdominal tenderness.   Musculoskeletal:         General: Swelling and tenderness present.      Right lower leg: No edema.      Left lower leg: No edema.   Skin:     General: Skin is warm.   Neurological:      General: No focal deficit present.      Mental Status: She is alert and oriented to person, place, and time.   Psychiatric:          Mood and Affect: Mood normal.         Behavior: Behavior normal.                Significant Labs: All pertinent labs within the past 24 hours have been reviewed.    Significant Imaging: I have reviewed all pertinent imaging results/findings within the past 24 hours.

## 2024-07-13 NOTE — ASSESSMENT & PLAN NOTE
Patient's anemia is currently stable. Has not received any PRBCs to date. Etiology likely d/t Iron deficiency  Current CBC reviewed-   Lab Results   Component Value Date    HGB 10.6 (L) 07/12/2024    HCT 34.5 (L) 07/12/2024     Monitor serial CBC and transfuse if patient becomes hemodynamically unstable, symptomatic or H/H drops below 7/21.

## 2024-07-13 NOTE — ASSESSMENT & PLAN NOTE
Chronic, controlled. Latest blood pressure and vitals reviewed-     Temp:  [96.9 °F (36.1 °C)-99.2 °F (37.3 °C)]   Pulse:  [80-95]   Resp:  [18-20]   BP: (118-152)/(77-85)   SpO2:  [96 %-98 %] .   Home meds for hypertension were reviewed and noted below.   Hypertension Medications               metoprolol succinate (TOPROL-XL) 25 MG 24 hr tablet Take 1 tablet (25 mg total) by mouth once daily.    NIFEdipine (PROCARDIA-XL) 60 MG (OSM) 24 hr tablet Take 1 tablet (60 mg total) by mouth once daily.            While in the hospital, will manage blood pressure as follows; Adjust home antihypertensive regimen as follows- Patient reports Nifedipine has been held since her procedure. She was receiving Metoprolol at OSH and it has not been removed. Will continue it here     Will utilize p.r.n. blood pressure medication only if patient's blood pressure greater than 180/110 and she develops symptoms such as worsening chest pain or shortness of breath.

## 2024-07-13 NOTE — PLAN OF CARE
Problem: Wound  Goal: Optimal Coping  7/13/2024 1533 by Ting Obrien, RN  Outcome: Progressing  7/13/2024 1514 by Ting Obrien, RN  Outcome: Progressing

## 2024-07-14 VITALS
HEIGHT: 67 IN | RESPIRATION RATE: 18 BRPM | HEART RATE: 89 BPM | TEMPERATURE: 98 F | SYSTOLIC BLOOD PRESSURE: 131 MMHG | OXYGEN SATURATION: 92 % | BODY MASS INDEX: 34.71 KG/M2 | WEIGHT: 221.13 LBS | DIASTOLIC BLOOD PRESSURE: 76 MMHG

## 2024-07-14 PROBLEM — G40.A09 ABSENCE SEIZURE: Chronic | Status: RESOLVED | Noted: 2023-10-18 | Resolved: 2024-07-14

## 2024-07-14 LAB
ALBUMIN SERPL BCP-MCNC: 2.6 G/DL (ref 3.5–5.2)
ALP SERPL-CCNC: 56 U/L (ref 55–135)
ALT SERPL W/O P-5'-P-CCNC: 8 U/L (ref 10–44)
ANION GAP SERPL CALC-SCNC: 9 MMOL/L (ref 8–16)
APTT PPP: 52.8 SEC (ref 21–32)
AST SERPL-CCNC: 12 U/L (ref 10–40)
BASOPHILS # BLD AUTO: 0.03 K/UL (ref 0–0.2)
BASOPHILS NFR BLD: 0.5 % (ref 0–1.9)
BILIRUB SERPL-MCNC: 0.2 MG/DL (ref 0.1–1)
BUN SERPL-MCNC: 11 MG/DL (ref 6–20)
CALCIUM SERPL-MCNC: 9.1 MG/DL (ref 8.7–10.5)
CHLORIDE SERPL-SCNC: 103 MMOL/L (ref 95–110)
CO2 SERPL-SCNC: 28 MMOL/L (ref 23–29)
CREAT SERPL-MCNC: 0.8 MG/DL (ref 0.5–1.4)
DIFFERENTIAL METHOD BLD: ABNORMAL
EOSINOPHIL # BLD AUTO: 0.1 K/UL (ref 0–0.5)
EOSINOPHIL NFR BLD: 1.7 % (ref 0–8)
ERYTHROCYTE [DISTWIDTH] IN BLOOD BY AUTOMATED COUNT: 13.6 % (ref 11.5–14.5)
EST. GFR  (NO RACE VARIABLE): >60 ML/MIN/1.73 M^2
GLUCOSE SERPL-MCNC: 99 MG/DL (ref 70–110)
HCT VFR BLD AUTO: 37.4 % (ref 37–48.5)
HGB BLD-MCNC: 11.2 G/DL (ref 12–16)
IMM GRANULOCYTES # BLD AUTO: 0.03 K/UL (ref 0–0.04)
IMM GRANULOCYTES NFR BLD AUTO: 0.5 % (ref 0–0.5)
LYMPHOCYTES # BLD AUTO: 2.4 K/UL (ref 1–4.8)
LYMPHOCYTES NFR BLD: 36.3 % (ref 18–48)
MAGNESIUM SERPL-MCNC: 1.8 MG/DL (ref 1.6–2.6)
MCH RBC QN AUTO: 26.2 PG (ref 27–31)
MCHC RBC AUTO-ENTMCNC: 29.9 G/DL (ref 32–36)
MCV RBC AUTO: 88 FL (ref 82–98)
MONOCYTES # BLD AUTO: 0.5 K/UL (ref 0.3–1)
MONOCYTES NFR BLD: 7.3 % (ref 4–15)
NEUTROPHILS # BLD AUTO: 3.5 K/UL (ref 1.8–7.7)
NEUTROPHILS NFR BLD: 53.7 % (ref 38–73)
NRBC BLD-RTO: 0 /100 WBC
PHOSPHATE SERPL-MCNC: 3.3 MG/DL (ref 2.7–4.5)
PLATELET # BLD AUTO: 380 K/UL (ref 150–450)
PMV BLD AUTO: 9.6 FL (ref 9.2–12.9)
POTASSIUM SERPL-SCNC: 3.7 MMOL/L (ref 3.5–5.1)
PROT SERPL-MCNC: 6.7 G/DL (ref 6–8.4)
RBC # BLD AUTO: 4.27 M/UL (ref 4–5.4)
SODIUM SERPL-SCNC: 140 MMOL/L (ref 136–145)
WBC # BLD AUTO: 6.56 K/UL (ref 3.9–12.7)

## 2024-07-14 PROCEDURE — 83735 ASSAY OF MAGNESIUM: CPT

## 2024-07-14 PROCEDURE — 94660 CPAP INITIATION&MGMT: CPT

## 2024-07-14 PROCEDURE — 84100 ASSAY OF PHOSPHORUS: CPT

## 2024-07-14 PROCEDURE — 80053 COMPREHEN METABOLIC PANEL: CPT

## 2024-07-14 PROCEDURE — 36415 COLL VENOUS BLD VENIPUNCTURE: CPT

## 2024-07-14 PROCEDURE — 25000003 PHARM REV CODE 250

## 2024-07-14 PROCEDURE — 85730 THROMBOPLASTIN TIME PARTIAL: CPT

## 2024-07-14 PROCEDURE — 27000190 HC CPAP FULL FACE MASK W/VALVE

## 2024-07-14 PROCEDURE — 25000003 PHARM REV CODE 250: Performed by: STUDENT IN AN ORGANIZED HEALTH CARE EDUCATION/TRAINING PROGRAM

## 2024-07-14 PROCEDURE — 99900035 HC TECH TIME PER 15 MIN (STAT)

## 2024-07-14 PROCEDURE — 85025 COMPLETE CBC W/AUTO DIFF WBC: CPT

## 2024-07-14 RX ORDER — DOXYCYCLINE 100 MG/1
100 CAPSULE ORAL EVERY 12 HOURS
Qty: 16 CAPSULE | Refills: 0 | Status: SHIPPED | OUTPATIENT
Start: 2024-07-14 | End: 2024-07-22

## 2024-07-14 RX ADMIN — METOPROLOL SUCCINATE 25 MG: 25 TABLET, EXTENDED RELEASE ORAL at 08:07

## 2024-07-14 RX ADMIN — Medication 500 MG: at 08:07

## 2024-07-14 RX ADMIN — FAMOTIDINE 40 MG: 20 TABLET ORAL at 08:07

## 2024-07-14 RX ADMIN — APIXABAN 10 MG: 5 TABLET, FILM COATED ORAL at 11:07

## 2024-07-14 RX ADMIN — DOXYCYCLINE HYCLATE 100 MG: 100 TABLET, COATED ORAL at 08:07

## 2024-07-14 NOTE — ASSESSMENT & PLAN NOTE
Patient's anemia is currently stable. Has not received any PRBCs to date. Etiology likely d/t Iron deficiency  Current CBC reviewed-   Lab Results   Component Value Date    HGB 11.2 (L) 07/14/2024    HCT 37.4 07/14/2024     Monitor serial CBC and transfuse if patient becomes hemodynamically unstable, symptomatic or H/H drops below 7/21.

## 2024-07-14 NOTE — PLAN OF CARE
Problem: Wound  Goal: Optimal Coping  Outcome: Progressing  Goal: Absence of Infection Signs and Symptoms  Outcome: Progressing  Goal: Optimal Pain Control and Function  Outcome: Progressing  Goal: Skin Health and Integrity  Outcome: Progressing

## 2024-07-14 NOTE — SUBJECTIVE & OBJECTIVE
Medications Prior to Admission   Medication Sig Dispense Refill Last Dose    doxycycline (VIBRAMYCIN) 100 MG Cap Take 1 capsule (100 mg total) by mouth every 12 (twelve) hours. 28 capsule 0 7/13/2024    famotidine (PEPCID) 40 MG tablet Take 1 tablet (40 mg total) by mouth once daily. 30 tablet 5 7/13/2024    ferrous sulfate (IRON) 325 mg (65 mg iron) Tab tablet Take 325 mg by mouth once daily.   7/13/2024    methocarbamoL (ROBAXIN) 750 MG Tab Take 1 tablet (750 mg total) by mouth 3 (three) times daily. 90 tablet 0 7/13/2024    metoprolol succinate (TOPROL-XL) 25 MG 24 hr tablet Take 1 tablet (25 mg total) by mouth once daily. 90 tablet 3 Past Month at 0900    NIFEdipine (PROCARDIA-XL) 60 MG (OSM) 24 hr tablet Take 1 tablet (60 mg total) by mouth once daily. 90 tablet 3 Past Month at 0900    acetaminophen (TYLENOL) 500 MG tablet Take 1,000 mg by mouth daily as needed for Pain.       ascorbic acid, vitamin C, (VITAMIN C) 500 MG tablet Take 500 mg by mouth once daily.       lancets (COAGUCHEK LANCETS) Misc 1 each by Misc.(Non-Drug; Combo Route) route once a week. 100 each 0     oxyCODONE-acetaminophen (PERCOCET) 5-325 mg per tablet Take 1 tablet by mouth every 4 (four) hours as needed for Pain. 42 tablet 0 More than a month    prothrombin time/INR test metr Misc 1 Device by Misc.(Non-Drug; Combo Route) route once a week. 1 each 0        Review of patient's allergies indicates:   Allergen Reactions    Pcn [penicillins] Other (See Comments)     unknown       Past Medical History:   Diagnosis Date    Anemia     Anticoagulated on Coumadin     Deep vein thrombosis     DVT (deep venous thrombosis)     GERD (gastroesophageal reflux disease)     Headache     Hypertension     Pulmonary embolism      Past Surgical History:   Procedure Laterality Date    DECOMPRESSION OF CHIARI MALFORMATION BY REMOVAL OF POSTERIOR ARCH OF FIRST CERVICAL VERTEBRA Bilateral 6/13/2024    Procedure: DECOMPRESSION, CHIARI MALFORMATION, BY 1ST CERVICAL  VERTEBRA POSTERIOR ARCH REMOVAL;  Surgeon: Juan Ramon León DO;  Location: [facility] OR [address] [place] [address];  Service: Neurosurgery;  Laterality: Bilateral;  (Chiari decompression with autologous duraplasty)  Axel mars    PULMONARY EMBOLISM SURGERY      TUBAL LIGATION       Family History       Problem Relation (Age of Onset)    Arthritis Mother    Cancer Mother    Depression Mother    Early death Mother    Heart disease Mother    Hypertension Mother    Mental illness Mother    Miscarriages / Stillbirths Mother          Tobacco Use    Smoking status: Never     Passive exposure: Never    Smokeless tobacco: Never   Substance and Sexual Activity    Alcohol use: No    Drug use: No    Sexual activity: Yes     Partners: Male     Review of Systems   All other systems reviewed and are negative.    Objective:     Weight: 100.3 kg (221 lb 1.9 oz)  Body mass index is 34.63 kg/m².  Vital Signs (Most Recent):  Temp: 97.9 °F (36.6 °C) (07/14/24 0012)  Pulse: 101 (07/14/24 0012)  Resp: 18 (07/14/24 0012)  BP: 129/80 (07/14/24 0012)  SpO2: 99 % (07/14/24 0012) Vital Signs (24h Range):  Temp:  [97.8 °F (36.6 °C)-98.6 °F (37 °C)] 97.9 °F (36.6 °C)  Pulse:  [] 101  Resp:  [16-18] 18  SpO2:  [96 %-99 %] 99 %  BP: (129-147)/(70-85) 129/80                         Female External Urinary Catheter w/ Suction 07/12/24 0452 (Active)   Skin no redness;no breakdown 07/13/24 1800   Tolerance no signs/symptoms of discomfort 07/13/24 1800   Suction Continuous suction at 40 mmHg 07/13/24 0800   Output (mL) 300 mL 07/13/24 0300          Physical Exam  Constitutional:       Appearance: She is well-developed and well-nourished.   Eyes:      Extraocular Movements: EOM normal.      Conjunctiva/sclera: Conjunctivae normal.      Pupils: Pupils are equal, round, and reactive to light.   Cardiovascular:      Pulses: Normal pulses.   Abdominal:      Palpations: Abdomen is soft.   Neurological:      Mental Status: She is alert and oriented to person,  place, and time.      Comments: AAOx4  PERRL  Cranial nerves intact  BUE 5/5  BLE 5/5  No drift    Inferior cranial incision well healed  Superior cranial incision damp at inferior portion but not draining, no fluctuance   Psychiatric:         Mood and Affect: Mood and affect normal.              Physical Exam:    Constitutional: She appears well-developed and well-nourished.     Eyes: Pupils are equal, round, and reactive to light. Conjunctivae and EOM are normal.     Cardiovascular: Normal pulses.     Abdominal: Soft.     Psych/Behavior: She is alert. She is oriented to person, place, and time. She has a normal mood and affect.     Neurological:   AAOx4  PERRL  Cranial nerves intact  BUE 5/5  BLE 5/5  No drift    Inferior cranial incision well healed  Superior cranial incision damp at inferior portion but not draining, no fluctuance       Significant Labs:  Recent Labs   Lab 07/13/24  0704   GLU 93      K 3.3*      CO2 29   BUN 6   CREATININE 0.7   CALCIUM 8.9   MG 1.8     Recent Labs   Lab 07/12/24  0548 07/13/24  0704   WBC 6.18 5.98   HGB 10.6* 10.6*   HCT 34.5* 33.8*    345     Recent Labs   Lab 07/12/24  0548 07/12/24  1225 07/12/24  1941 07/13/24  0704   INR 1.2  --   --  1.2   APTT 73.1* 80.2* 51.1* 51.1*     Microbiology Results (last 7 days)       ** No results found for the last 168 hours. **          All pertinent labs from the last 24 hours have been reviewed.    Significant Diagnostics:  I have reviewed all pertinent imaging results/findings within the past 24 hours.

## 2024-07-14 NOTE — ASSESSMENT & PLAN NOTE
Patient had chiari malformation s/p decompression 6/13/24. Followed by neurosurgery at [facility]. She reports neurologic symptoms such as blurry vision and loss of balance resolved since the procedure. Noted serosanguinous discharge at OSH near post op site. Transferred for NSGY evaluation. Pt given doxycycline at previous visit with [facility] on 7/03 with EOT (7/18)       PLAN:  - NSGY consulted, appreciate recs   -At this point no indication for acute interventions   -Continue doxy (EOT 7/18)   -Per [facility] okay to transition to DOAC  - Q4h neuro checks  - Fall/seizure precautions  - Continue Robaxin TID prn

## 2024-07-14 NOTE — DISCHARGE SUMMARY
"[facility] - Neurosurgery ([facility])  [facility]  Discharge Summary      Patient Name: Elva Burroughs  MRN: [id number]  BERYL: [id number]  Patient Class: IP- Inpatient  Admission Date: 7/13/2024  Hospital Length of Stay: 1 days  Discharge Date and Time:  07/14/2024 10:44 AM  Attending Physician: Solis Thomas MD   Discharging Provider: Toby Skelton MD  Primary Care Provider: Zion Almaraz FNP  [facility] Medicine Team: [facility] 1 Toby Skelton MD  Primary Care Team: [facility] 1    HPI:   Ms. Burroughs is a 53 year old female with a PMH of DVT (in the 90's) and PE (in 2015) supposed to be on lifelong AC (warfarin), Chiari 1 malformation s/p recent decompression surgery on 6/13/24, seizure, stroke, HTN, GERD, and DELVIS who is presenting as a transfer from [facility] for NSGY evaluation in the setting of reported serosanguinous fluid draining from post op scar. She initially  presented to [facility] due to an evaluation of a DVT. Patient was placed on DVT PPX with Lovenox after her procedure and was supposed to follow up with PCP on POD5 to transition back to Warfarin. Patient was unable to do so. During her recent post op visit with Dr. León 7/10/24 she reported pain her right leg similar to when she had a DVT. He ordered a STAT US of RLE. It revealed extensive occlusive thrombus noted throughout the veins of the right lower extremity. She was hospitalized and placed on a heparin gtt with plans to transition back to Warfarin. During her hospitalization, it was noted that she had leakage from post op wound. CT head revealed "Postsurgical changes Chiari decompression now evident with small fluid collection and emphysema superficial to the operative defect as above the fluid collection measuring approximately 2.3 cm". Patient reports that other than a dull ache in her right lower leg she feels well. She denies blurry vision, falls, seizures, confusion, CP, SOB, abdominal pain, n/v/d, " bloody BM, and QUINTERO. She reports she was very happy after she had her procedure because neurologic symptoms such as blurry vision, and loss of balance went away. She will also need vascular vs. IC input regarding possible intervention for significant amount of thrombus.               * No surgery found *      Hospital Course:   Patient transferred from OSH for NSGY evaluation after noticing CSF leakage from the incision site and CTH showed some postop changes in midline of occiput and posterior arch of C1 with persistent air collection. (Recent decompression surgery on 6/13 By Dr. León at [facility] for chiari 1 malformation). Interventional cardiology consulted because patient was found to have extensive occlusive thrombus noted throughout the veins of the right lower extremity including the right common femoral vein, right superficial femoral vein, right popliteal vein, right peroneal veins, right anterior tibial veins, and right posterior tibial veins. Patient started on heparin gtt while inpatient and interventional cards determined there was no emergent need for acute intervention at this time. NSGY consulted and recommended no surgical intervention and to continue patient on doxycycline for completion of course and follow up with neurosurgery. On 7/14 patient was transitioned from heparin to P.O eliquis 10 mg BID for 7 days and then 5 mg BID indefinitely. Pt discharged with follow up with PCP, neurosurgery, and vascular surgery for management of CSF leakage and potential interventions for thrombus.         Physical Exam  Vitals and nursing note reviewed.   Constitutional:       General: She is not in acute distress.     Appearance: Normal appearance. She is not ill-appearing or diaphoretic.   HENT:      Head: Normocephalic.      Comments: Noted post op scars on Occiput. Noted scabbing from post op site on superior portion of scar. No signs of active draining.     Nose: Nose normal. No congestion.      Mouth/Throat:       Mouth: Mucous membranes are moist.      Pharynx: Oropharynx is clear. No oropharyngeal exudate.   Eyes:      General: No scleral icterus.     Extraocular Movements: Extraocular movements intact.      Conjunctiva/sclera: Conjunctivae normal.   Cardiovascular:      Rate and Rhythm: Normal rate and regular rhythm.      Pulses: Normal pulses.      Heart sounds: Normal heart sounds. No murmur heard.     No friction rub. No gallop.   Pulmonary:      Effort: Pulmonary effort is normal. No respiratory distress.      Breath sounds: Normal breath sounds. No stridor. No wheezing, rhonchi or rales.   Chest:      Chest wall: No tenderness.   Abdominal:      General: Abdomen is flat. Bowel sounds are normal. There is no distension.      Palpations: Abdomen is soft.      Tenderness: There is no abdominal tenderness. There is no guarding or rebound.   Musculoskeletal:         General: No swelling or tenderness.      Right lower leg: No edema.      Left lower leg: No edema.      Comments: No noted swelling or tenderness in RLE   Skin:     General: Skin is warm and dry.      Capillary Refill: Capillary refill takes less than 2 seconds.      Coloration: Skin is not jaundiced.      Findings: No erythema.   Neurological:      General: No focal deficit present.      Mental Status: She is alert. Mental status is at baseline.          Vitals:    07/13/24 1539 07/14/24 0012 07/14/24 0452 07/14/24 0743   BP: 131/78 129/80 128/80 131/76   BP Location: Right arm      Patient Position: Sitting   Lying   Pulse: 93 101 95 89   Resp: 18 18 18 18   Temp: 98.6 °F (37 °C) 97.9 °F (36.6 °C) 98.4 °F (36.9 °C) 98.1 °F (36.7 °C)   TempSrc: Oral Oral Oral Axillary   SpO2: 96% 99% 99% (!) 92%   Weight:       Height:            Goals of Care Treatment Preferences:  Code Status: Full Code      Consults:   Consults (From admission, onward)          Status Ordering Provider     Inpatient consult to Interventional Cardiology  Once        Provider:  (Not yet  assigned)    Acknowledged BIN GUERRERO     Inpatient consult to Neurosurgery  Once        Provider:  (Not yet assigned)    Completed BIN GUERRERO              Final Active Diagnoses:    Diagnosis Date Noted POA    PRINCIPAL PROBLEM:  Acute deep vein thrombosis (DVT) of right lower extremity [I82.401] 07/11/2024 Yes    Acid reflux [K21.9] 05/31/2024 Yes    Iron deficiency anemia secondary to inadequate dietary iron intake [D50.8] 03/05/2023 Yes    Chiari malformation type I [G93.5] 05/06/2022 Yes     Chronic    Obstructive sleep apnea (adult) (pediatric) [G47.33]  Yes    Essential hypertension [I10] 05/17/2018 Yes     Chronic      Problems Resolved During this Admission:    Diagnosis Date Noted Date Resolved POA    Absence seizure [G40.A09] 10/18/2023 07/14/2024 Yes     Chronic    Hypercoagulable state [D68.59] 06/02/2022 07/13/2024 Yes     Chronic    Chronic anticoagulation [Z79.01] 05/17/2018 07/13/2024 Not Applicable     Chronic       Discharged Condition: stable    Disposition: Home or Self Care    Follow Up:   Follow-up Information       Zion Almaraz FNP Follow up in 1 week(s).    Specialties: Internal Medicine, Family Medicine  Contact information:  [address] [place] [address]  [phone]                           Patient Instructions:      Ambulatory referral/consult to Vascular Surgery   Standing Status: Future   Referral Priority: Routine Referral Type: Consultation   Referral Reason: Specialty Services Required   Requested Specialty: Vascular Surgery   Number of Visits Requested: 1     Diet Adult Regular     Notify your health care provider if you experience any of the following:  temperature >100.4     Notify your health care provider if you experience any of the following:  persistent nausea and vomiting or diarrhea     Notify your health care provider if you experience any of the following:  severe uncontrolled pain     Notify your health care provider if you experience any of  the following:  redness, tenderness, or signs of infection (pain, swelling, redness, odor or green/yellow discharge around incision site)     Notify your health care provider if you experience any of the following:  difficulty breathing or increased cough     Notify your health care provider if you experience any of the following:  severe persistent headache     Notify your health care provider if you experience any of the following:  worsening rash     Notify your health care provider if you experience any of the following:  persistent dizziness, light-headedness, or visual disturbances     Notify your health care provider if you experience any of the following:  increased confusion or weakness     Notify your health care provider if you experience any of the following:   Order Comments: New, substantial drainage or discharge with pus     Activity as tolerated     Shower on day dressing removed (No bath)       Significant Diagnostic Studies: Labs: All labs within the past 24 hours have been reviewed  Radiology:   No orders to display         Pending Diagnostic Studies:       None           Medications:  Reconciled Home Medications:      Medication List        START taking these medications      apixaban 5 mg Tab  Commonly known as: ELIQUIS  Take 2 tablets (10 mg total) by mouth 2 (two) times daily for 7 days, THEN 1 tablet (5 mg total) 2 (two) times daily.  Start taking on: July 14, 2024            CONTINUE taking these medications      acetaminophen 500 MG tablet  Commonly known as: TYLENOL  Take 1,000 mg by mouth daily as needed for Pain.     doxycycline 100 MG Cap  Commonly known as: VIBRAMYCIN  Take 1 capsule (100 mg total) by mouth every 12 (twelve) hours. for 8 days     famotidine 40 MG tablet  Commonly known as: PEPCID  Take 1 tablet (40 mg total) by mouth once daily.     IRON 325 mg (65 mg iron) Tab tablet  Generic drug: ferrous sulfate  Take 325 mg by mouth once daily.     methocarbamoL 750 MG  Tab  Commonly known as: ROBAXIN  Take 1 tablet (750 mg total) by mouth 3 (three) times daily.     metoprolol succinate 25 MG 24 hr tablet  Commonly known as: TOPROL-XL  Take 1 tablet (25 mg total) by mouth once daily.     NIFEdipine 60 MG (OSM) 24 hr tablet  Commonly known as: PROCARDIA-XL  Take 1 tablet (60 mg total) by mouth once daily.     VITAMIN C 500 MG tablet  Generic drug: ascorbic acid (vitamin C)  Take 500 mg by mouth once daily.            STOP taking these medications      lancets Misc  Commonly known as: COAGUCHEK LANCETS     oxyCODONE-acetaminophen 5-325 mg per tablet  Commonly known as: PERCOCET     prothrombin time/INR test metr Misc              Indwelling Lines/Drains at time of discharge:   Lines/Drains/Airways       none            Time spent on the discharge of patient: 35 minutes         Toby Skelton MD  PGY-1  Department of Hospital Medicine  [facility] - Neurosurgery ([facility])

## 2024-07-14 NOTE — ASSESSMENT & PLAN NOTE
Patient presenting as a transfer from [facility] for [facility] eval. Initially admitted due to RLE edema/pain. Found to have extensive clots in RLE on US. Patient did not transition to Warfarin after her recent surgery and was just using Lovenox DVT PPX. No signs of hemodynamic instability. Satting 100 % on RA. No tachycardia noted. No SOB or CP. Patient reports RLE pain improved.     PLAN:  - Started on high intensity heparin gtt  - Interventional cardiology consulted on admission, appreciate recs   -At this point no indication for acute intervention   -CTM and reassess for intervention 7/15  -If signs of hemodynamic instability STAT page interventional cards and retrieve imaging  - Per NSSGY, okay to transition to DOAC, consider eliquis  - Daily CBC, CMP, Mg, Phos  - Continuous pulse oximiter

## 2024-07-14 NOTE — CONSULTS
Juan Alberto Santiago - Neurosurgery ([facility])  Neurosurgery  Consult Note    Inpatient consult to Neurosurgery  Consult performed by: Ellyn Vazquez MD  Consult ordered by: David Franklin DO        Subjective:     Chief Complaint/Reason for Admission: wound leakage, DVT    History of Present Illness: 54 yo F with Chiari 1 malformation with recent chiari decompression last month with Dr. León who presents to hospital with concern for leakage from incision. She was recently seen in clinic after some leakage and given an antibiotic. Patient says that helped a lot with the drainage and she has almost stopped draining anything at all. She was given doxycycline on 7/10 and has been taking it since    On exam her inferior incision from surgery looks to be healed well, no drainage. The incision just superior to that from where the pericranial autograft was taken is damp at the inferior portion but not actively draining anything or fluctuant.     Medications Prior to Admission   Medication Sig Dispense Refill Last Dose    doxycycline (VIBRAMYCIN) 100 MG Cap Take 1 capsule (100 mg total) by mouth every 12 (twelve) hours. 28 capsule 0 7/13/2024    famotidine (PEPCID) 40 MG tablet Take 1 tablet (40 mg total) by mouth once daily. 30 tablet 5 7/13/2024    ferrous sulfate (IRON) 325 mg (65 mg iron) Tab tablet Take 325 mg by mouth once daily.   7/13/2024    methocarbamoL (ROBAXIN) 750 MG Tab Take 1 tablet (750 mg total) by mouth 3 (three) times daily. 90 tablet 0 7/13/2024    metoprolol succinate (TOPROL-XL) 25 MG 24 hr tablet Take 1 tablet (25 mg total) by mouth once daily. 90 tablet 3 Past Month at 0900    NIFEdipine (PROCARDIA-XL) 60 MG (OSM) 24 hr tablet Take 1 tablet (60 mg total) by mouth once daily. 90 tablet 3 Past Month at 0900    acetaminophen (TYLENOL) 500 MG tablet Take 1,000 mg by mouth daily as needed for Pain.       ascorbic acid, vitamin C, (VITAMIN C) 500 MG tablet Take 500 mg by mouth once daily.       lancets  (COAGUCHEK LANCETS) Misc 1 each by Misc.(Non-Drug; Combo Route) route once a week. 100 each 0     oxyCODONE-acetaminophen (PERCOCET) 5-325 mg per tablet Take 1 tablet by mouth every 4 (four) hours as needed for Pain. 42 tablet 0 More than a month    prothrombin time/INR test metr Misc 1 Device by Misc.(Non-Drug; Combo Route) route once a week. 1 each 0        Review of patient's allergies indicates:   Allergen Reactions    Pcn [penicillins] Other (See Comments)     unknown       Past Medical History:   Diagnosis Date    Anemia     Anticoagulated on Coumadin     Deep vein thrombosis     DVT (deep venous thrombosis)     GERD (gastroesophageal reflux disease)     Headache     Hypertension     Pulmonary embolism      Past Surgical History:   Procedure Laterality Date    DECOMPRESSION OF CHIARI MALFORMATION BY REMOVAL OF POSTERIOR ARCH OF FIRST CERVICAL VERTEBRA Bilateral 6/13/2024    Procedure: DECOMPRESSION, CHIARI MALFORMATION, BY 1ST CERVICAL VERTEBRA POSTERIOR ARCH REMOVAL;  Surgeon: Juan Ramon León DO;  Location: [facility] OR [address] [place] [address];  Service: Neurosurgery;  Laterality: Bilateral;  (Chiari decompression with autologous duraplasty)  Axel mars    PULMONARY EMBOLISM SURGERY      TUBAL LIGATION       Family History       Problem Relation (Age of Onset)    Arthritis Mother    Cancer Mother    Depression Mother    Early death Mother    Heart disease Mother    Hypertension Mother    Mental illness Mother    Miscarriages / Stillbirths Mother          Tobacco Use    Smoking status: Never     Passive exposure: Never    Smokeless tobacco: Never   Substance and Sexual Activity    Alcohol use: No    Drug use: No    Sexual activity: Yes     Partners: Male     Review of Systems   All other systems reviewed and are negative.    Objective:     Weight: 100.3 kg (221 lb 1.9 oz)  Body mass index is 34.63 kg/m².  Vital Signs (Most Recent):  Temp: 97.9 °F (36.6 °C) (07/14/24 0012)  Pulse: 101 (07/14/24 0012)  Resp: 18  (07/14/24 0012)  BP: 129/80 (07/14/24 0012)  SpO2: 99 % (07/14/24 0012) Vital Signs (24h Range):  Temp:  [97.8 °F (36.6 °C)-98.6 °F (37 °C)] 97.9 °F (36.6 °C)  Pulse:  [] 101  Resp:  [16-18] 18  SpO2:  [96 %-99 %] 99 %  BP: (129-147)/(70-85) 129/80                         Female External Urinary Catheter w/ Suction 07/12/24 0452 (Active)   Skin no redness;no breakdown 07/13/24 1800   Tolerance no signs/symptoms of discomfort 07/13/24 1800   Suction Continuous suction at 40 mmHg 07/13/24 0800   Output (mL) 300 mL 07/13/24 0300          Physical Exam  Constitutional:       Appearance: She is well-developed and well-nourished.   Eyes:      Extraocular Movements: EOM normal.      Conjunctiva/sclera: Conjunctivae normal.      Pupils: Pupils are equal, round, and reactive to light.   Cardiovascular:      Pulses: Normal pulses.   Abdominal:      Palpations: Abdomen is soft.   Neurological:      Mental Status: She is alert and oriented to person, place, and time.      Comments: AAOx4  PERRL  Cranial nerves intact  BUE 5/5  BLE 5/5  No drift    Inferior cranial incision well healed  Superior cranial incision damp at inferior portion but not draining, no fluctuance   Psychiatric:         Mood and Affect: Mood and affect normal.              Physical Exam:    Constitutional: She appears well-developed and well-nourished.     Eyes: Pupils are equal, round, and reactive to light. Conjunctivae and EOM are normal.     Cardiovascular: Normal pulses.     Abdominal: Soft.     Psych/Behavior: She is alert. She is oriented to person, place, and time. She has a normal mood and affect.     Neurological:   AAOx4  PERRL  Cranial nerves intact  BUE 5/5  BLE 5/5  No drift    Inferior cranial incision well healed  Superior cranial incision damp at inferior portion but not draining, no fluctuance       Significant Labs:  Recent Labs   Lab 07/13/24  0704   GLU 93      K 3.3*      CO2 29   BUN 6   CREATININE 0.7   CALCIUM 8.9    MG 1.8     Recent Labs   Lab 07/12/24  0548 07/13/24  0704   WBC 6.18 5.98   HGB 10.6* 10.6*   HCT 34.5* 33.8*    345     Recent Labs   Lab 07/12/24  0548 07/12/24  1225 07/12/24  1941 07/13/24  0704   INR 1.2  --   --  1.2   APTT 73.1* 80.2* 51.1* 51.1*     Microbiology Results (last 7 days)       ** No results found for the last 168 hours. **          All pertinent labs from the last 24 hours have been reviewed.    Significant Diagnostics:  I have reviewed all pertinent imaging results/findings within the past 24 hours.  Assessment/Plan:     Chiari malformation type I  52 yo F with Chiari 1 malformation with recent chiari decompression last month with Dr. León who presents to hospital with concern for leakage from incision. She was recently seen in clinic after some leakage and given an antibiotic. Patient says that helped a lot with the drainage and she has almost stopped draining anything at all. She was given doxycycline on 7/10 and has been taking it since    On exam her inferior incision from surgery looks to be healed well, no drainage. The incision just superior to that from where the pericranial autograft was taken is damp at the inferior portion but not actively draining anything or fluctuant.     CTH 7/12: post op changes from chiari decompression, stable from prior after therapeutic on heparin gtt  US BLE 7/12: extensive DVT in RLE    Plan:  - admitted to   - no acute neurosurgical intervention for wound leakage after Chiari malformation surgery; appears to be improving with doxycycline. Would continue doxycycline course and will have patient scheduled for close follow up in clinic  - of note, the superior incision from her surgery is the one that appears to have some fluid leakage; however this incision does not communicate with the more inferior incision where the expansile duraplasty was done so should not have CSF communication  - therapeutic on heparin gtt for RLE DVT; interventional  cards consulted for possible intervention and did not recommend at this time  - ok to switch to PO anticoagulant per HM         Thank you for your consult. I will follow-up with patient. Please contact us if you have any additional questions.    Ellyn Vazquez MD  Neurosurgery  [facility] - Neurosurgery ([facility])

## 2024-07-14 NOTE — PLAN OF CARE
[facility] - Neurosurgery (Hospital)  Discharge Final Note    Primary Care Provider: Zion Almaraz FNP    Expected Discharge Date: 7/14/2024    Final Discharge Note (most recent)       Final Note - 07/14/24 1106          Final Note    Assessment Type Final Discharge Note     Anticipated Discharge Disposition Home or Self Care     What phone number can be called within the next 1-3 days to see how you are doing after discharge? [phone]     Hospital Resources/Appts/Education Provided Provided patient/caregiver with written discharge plan information;Appointments scheduled and added to AVS        Post-Acute Status    Patient choice form signed by patient/caregiver List with quality metrics by geographic area provided     Discharge Delays None known at this time                     Important Message from Medicare             Contact Info       Zion Almaraz FNP   Specialty: Internal Medicine, Family Medicine   Relationship: PCP - General  Nurse Practitioner    [address] [place] [address]   Phone: [phone]       Next Steps: Follow up in 1 week(s)          Patient to discharge to home today; no needs.     GABRIELLA Rojo  [facility]  [phone]

## 2024-07-14 NOTE — ASSESSMENT & PLAN NOTE
52 yo F with Chiari 1 malformation with recent chiari decompression last month with Dr. León who presents to hospital with concern for leakage from incision. She was recently seen in clinic after some leakage and given an antibiotic. Patient says that helped a lot with the drainage and she has almost stopped draining anything at all. She was given doxycycline on 7/10 and has been taking it since    On exam her inferior incision from surgery looks to be healed well, no drainage. The incision just superior to that from where the pericranial autograft was taken is damp at the inferior portion but not actively draining anything or fluctuant.     CTH 7/12: post op changes from chiari decompression, stable from prior after therapeutic on heparin gtt  US BLE 7/12: extensive DVT in RLE    Plan:  - admitted to   - no acute neurosurgical intervention for wound leakage after Chiari malformation surgery; appears to be improving with doxycycline. Would continue doxycycline course and will have patient scheduled for close follow up in clinic  - of note, the superior incision from her surgery is the one that appears to have some fluid leakage; however this incision does not communicate with the more inferior incision where the expansile duraplasty was done so should not have CSF communication  - therapeutic on heparin gtt for RLE DVT; interventional cards consulted for possible intervention and did not recommend at this time  - ok to switch to PO anticoagulant per

## 2024-07-14 NOTE — DISCHARGE INSTRUCTIONS
Please take your eliquis 10 mg twice a day for 1 week, then decrease eliquis to 5 mg twice a day.  Continue to take your doxycycline until completion and follow up with neurosurgery

## 2024-07-14 NOTE — SUBJECTIVE & OBJECTIVE
Interval History: NAEON and VSS. Pt feeling well today without complaints of leg pain. Denies CP, SOB, palpitations, HA, vision changes. Remains on high intensity heparin gtt    Review of Systems   Constitutional:  Negative for chills and fever.   HENT:  Negative for sinus pressure, sinus pain and trouble swallowing.    Eyes:  Negative for visual disturbance.   Respiratory:  Negative for cough, chest tightness, shortness of breath and wheezing.    Cardiovascular:  Positive for leg swelling. Negative for chest pain and palpitations.   Gastrointestinal:  Negative for abdominal pain, blood in stool, diarrhea, nausea and vomiting.   Genitourinary:  Negative for dysuria and flank pain.   Neurological:  Negative for dizziness, syncope, weakness, light-headedness and headaches.   Psychiatric/Behavioral:  Negative for confusion.      Objective:     Vital Signs (Most Recent):  Temp: 98.4 °F (36.9 °C) (07/14/24 0452)  Pulse: 95 (07/14/24 0452)  Resp: 18 (07/14/24 0452)  BP: 128/80 (07/14/24 0452)  SpO2: 99 % (07/14/24 0452) Vital Signs (24h Range):  Temp:  [97.9 °F (36.6 °C)-98.6 °F (37 °C)] 98.4 °F (36.9 °C)  Pulse:  [] 95  Resp:  [18] 18  SpO2:  [96 %-99 %] 99 %  BP: (128-138)/(70-80) 128/80     Weight: 100.3 kg (221 lb 1.9 oz)  Body mass index is 34.63 kg/m².    Intake/Output Summary (Last 24 hours) at 7/14/2024 0655  Last data filed at 7/14/2024 0557  Gross per 24 hour   Intake --   Output 700 ml   Net -700 ml         Physical Exam  Constitutional:       General: She is not in acute distress.     Appearance: Normal appearance. She is obese.   HENT:      Head:      Comments: Noted post op scars on Occiput. Noted scabbing from post op site on superior portion of scar. No signs of active draining.      Nose: Nose normal.      Mouth/Throat:      Mouth: Mucous membranes are moist.      Pharynx: Oropharynx is clear. No oropharyngeal exudate.   Eyes:      General: No scleral icterus.     Extraocular Movements: Extraocular  movements intact.   Cardiovascular:      Rate and Rhythm: Normal rate and regular rhythm.      Pulses: Normal pulses.      Heart sounds: Normal heart sounds. No murmur heard.  Pulmonary:      Effort: Pulmonary effort is normal. No respiratory distress.      Breath sounds: Normal breath sounds. No wheezing.   Abdominal:      General: Bowel sounds are normal. There is no distension.      Palpations: Abdomen is soft.      Tenderness: There is no abdominal tenderness.   Musculoskeletal:         General: Swelling and tenderness present.      Right lower leg: No edema.      Left lower leg: No edema.   Skin:     General: Skin is warm.   Neurological:      General: No focal deficit present.      Mental Status: She is alert and oriented to person, place, and time.   Psychiatric:         Mood and Affect: Mood normal.         Behavior: Behavior normal.             Significant Labs: All pertinent labs within the past 24 hours have been reviewed.  Recent Lab Results         07/14/24  0455   07/13/24  0704        Albumin 2.6   2.5       ALP 56   54       ALT 8   8       Anion Gap 9   8       PTT   51.1  Comment: Refer to local heparin nomogram for intensity/dose specific   therapeutic   range.         AST 12   13       Baso # 0.03   0.02       Basophil % 0.5   0.3       BILIRUBIN TOTAL 0.2  Comment: For infants and newborns, interpretation of results should be based  on gestational age, weight and in agreement with clinical  observations.    Premature Infant recommended reference ranges:  Up to 24 hours.............<8.0 mg/dL  Up to 48 hours............<12.0 mg/dL  3-5 days..................<15.0 mg/dL  6-29 days.................<15.0 mg/dL     0.3  Comment: For infants and newborns, interpretation of results should be based  on gestational age, weight and in agreement with clinical  observations.    Premature Infant recommended reference ranges:  Up to 24 hours.............<8.0 mg/dL  Up to 48 hours............<12.0 mg/dL  3-5  days..................<15.0 mg/dL  6-29 days.................<15.0 mg/dL         BUN 11   6       Calcium 9.1   8.9       Chloride 103   102       CO2 28   29       Creatinine 0.8   0.7       Differential Method Automated   Automated       eGFR >60.0   >60.0       Eos # 0.1   0.1       Eos % 1.7   1.8       Glucose 99   93       Gran # (ANC) 3.5   3.0       Gran % 53.7   50.0       Hematocrit 37.4   33.8       Hemoglobin 11.2   10.6       Immature Grans (Abs) 0.03  Comment: Mild elevation in immature granulocytes is non specific and   can be seen in a variety of conditions including stress response,   acute inflammation, trauma and pregnancy. Correlation with other   laboratory and clinical findings is essential.     0.02  Comment: Mild elevation in immature granulocytes is non specific and   can be seen in a variety of conditions including stress response,   acute inflammation, trauma and pregnancy. Correlation with other   laboratory and clinical findings is essential.         Immature Granulocytes 0.5   0.3       INR   1.2  Comment: Coumadin Therapy:  2.0 - 3.0 for INR for all indicators except mechanical heart valves  and antiphospholipid syndromes which should use 2.5 - 3.5.         Lymph # 2.4   2.3       Lymph % 36.3   38.6       Magnesium  1.8   1.8       MCH 26.2   26.7       MCHC 29.9   31.4       MCV 88   85       Mono # 0.5   0.5       Mono % 7.3   9.0       MPV 9.6   9.6       nRBC 0   0       Phosphorus Level 3.3   3.2       Platelet Count 380   345       Potassium 3.7   3.3       PROTEIN TOTAL 6.7   6.3       PT   13.0       RBC 4.27   3.97       RDW 13.6   13.7       Sodium 140   139       WBC 6.56   5.98               Significant Imaging: I have reviewed all pertinent imaging results/findings within the past 24 hours.  No orders to display

## 2024-07-14 NOTE — HPI
52 yo F with Chiari 1 malformation with recent chiari decompression last month with Dr. León who presents to hospital with concern for leakage from incision. She was recently seen in clinic after some leakage and given an antibiotic. Patient says that helped a lot with the drainage and she has almost stopped draining anything at all. She was given doxycycline on 7/10 and has been taking it since    On exam her inferior incision from surgery looks to be healed well, no drainage. The incision just superior to that from where the pericranial autograft was taken is damp at the inferior portion but not actively draining anything or fluctuant.

## 2024-07-14 NOTE — ASSESSMENT & PLAN NOTE
Chronic, controlled. Latest blood pressure and vitals reviewed-     Temp:  [97.9 °F (36.6 °C)-98.6 °F (37 °C)]   Pulse:  []   Resp:  [18]   BP: (128-137)/(76-80)   SpO2:  [92 %-99 %] .   Home meds for hypertension were reviewed and noted below.   Hypertension Medications               metoprolol succinate (TOPROL-XL) 25 MG 24 hr tablet Take 1 tablet (25 mg total) by mouth once daily.    NIFEdipine (PROCARDIA-XL) 60 MG (OSM) 24 hr tablet Take 1 tablet (60 mg total) by mouth once daily.            While in the hospital, will manage blood pressure as follows; Adjust home antihypertensive regimen as follows- Patient reports Nifedipine has been held since her procedure. She was receiving Metoprolol at OSH and it has not been removed. Will continue it here     Will utilize p.r.n. blood pressure medication only if patient's blood pressure greater than 180/110 and she develops symptoms such as worsening chest pain or shortness of breath.

## 2024-07-17 ENCOUNTER — OFFICE VISIT (OUTPATIENT)
Dept: NEUROSURGERY | Facility: CLINIC | Age: 54
End: 2024-07-17
Payer: COMMERCIAL

## 2024-07-17 DIAGNOSIS — G93.5 CHIARI MALFORMATION TYPE I: Primary | Chronic | ICD-10-CM

## 2024-07-17 DIAGNOSIS — Z98.890 STATUS POST CRANIECTOMY: ICD-10-CM

## 2024-07-17 DIAGNOSIS — G95.0 SYRINX OF SPINAL CORD: ICD-10-CM

## 2024-07-17 PROCEDURE — 99024 POSTOP FOLLOW-UP VISIT: CPT | Mod: 95,,, | Performed by: NEUROLOGICAL SURGERY

## 2024-07-17 PROCEDURE — 1111F DSCHRG MED/CURRENT MED MERGE: CPT | Mod: CPTII,95,, | Performed by: NEUROLOGICAL SURGERY

## 2024-07-17 PROCEDURE — 4010F ACE/ARB THERAPY RXD/TAKEN: CPT | Mod: CPTII,95,, | Performed by: NEUROLOGICAL SURGERY

## 2024-07-17 PROCEDURE — 3044F HG A1C LEVEL LT 7.0%: CPT | Mod: CPTII,95,, | Performed by: NEUROLOGICAL SURGERY

## 2024-07-17 PROCEDURE — 1159F MED LIST DOCD IN RCRD: CPT | Mod: CPTII,95,, | Performed by: NEUROLOGICAL SURGERY

## 2024-07-17 PROCEDURE — 1160F RVW MEDS BY RX/DR IN RCRD: CPT | Mod: CPTII,95,, | Performed by: NEUROLOGICAL SURGERY

## 2024-07-17 NOTE — PROGRESS NOTES
The patient location is: home  The chief complaint leading to consultation is: Postop wound check    Visit type: audiovisual    Face to Face time with patient: 6  10 minutes of total time spent on the encounter, which includes face to face time and non-face to face time preparing to see the patient (eg, review of tests), Obtaining and/or reviewing separately obtained history, Documenting clinical information in the electronic or other health record, Independently interpreting results (not separately reported) and communicating results to the patient/family/caregiver, or Care coordination (not separately reported).         Each patient to whom he or she provides medical services by telemedicine is:  (1) informed of the relationship between the physician and patient and the respective role of any other health care provider with respect to management of the patient; and (2) notified that he or she may decline to receive medical services by telemedicine and may withdraw from such care at any time.    Notes:   CHIEF COMPLAINT:  Chiari malformation s/p decompression (5 wks postop)    INTERVAL HISTORY (7/17/24):  Patient presents virtually for 1 week wound check.  After last visit, patient was found to have extensive right lower extremity DVT on ultrasound and was sent to the emergency room where she was placed on appropriate anticoagulation.  She had some drainage from her superior wound and was transferred to [facility] where the neurosurgery team evaluated her.  She was neurologically doing well and the wound was no longer draining.  She we will complete her antibiotics on Saturday.  She states that her wound has not had any further drainage.  Overall she is doing well and has transitioned back to her Coumadin.  Her right lower extremity intermittently swells but is not painful.    INTERVAL HISTORY (7/10/24):  Patient is now 4 weeks postop.  She was requested to return to clinic for continued monitoring of her wound.   She was placed on doxycycline x2 weeks.  She presents with her  and both report that the wound drained a little bit for a couple days and then stopped.  Appears to have sealed itself and she does not report any further issues.  She has not had any return of her headaches and reports that her anxiety levels are decreased.    Reports right medial thigh pain that is similar to when she had DVT.  She is still taking lovenox injections and has not transitioned back to coumadin.  She still needs to contact her PCP and states she will today.    INTERVAL HISTORY (7/3/24):  Patient is now 2-3 weeks status post suboccipital craniectomy for decompression of Chiari malformation on 6/13/24.  She recovered remarkably well and was discharged on postop day 2.  She had good pain control and reported significant improvement in her headaches.  She has been doing well at home however reports that she has had some less severe headaches over the past 1-2 days with some imbalance and blurry vision.  She attributes this to anxiety as does her .  They report some scant drainage from the inferior aspect of the wound that is was 1st noticed yesterday.  She denies any fevers, chills, or any other signs of infection.  She denies any significant surgical site pain but acknowledges that her neck is stiff.  She did fine muscle relaxers helpful but has run out.    HPI:  Elva Burroughs is a 53 y.o.  female with below PMH including RIS and prior strokes/TIA for which she is on warfarin and ASA, who is referred to me by Dr Sutherland for evaluation of chiari malformation.  She reports headaches that are frontal and occipital in location.  Her headaches used to be daily but now occur from every other week to 1-2 times per week.  She finds that Tylenol and aspirin have been helpful.  She reports that coughing and bending over intensify her headaches.  She does not think that her headaches are positional however she finds that resting or  lying down will help her headaches.    She reports imbalance and gait disturbance.    She also reports dizziness and blurry vision.  She has been evaluated by Ophthalmology who reports that she has 2020 vision and does not have any papilledema.  The source of her blurry vision remains unclear.      Whenever her recent MRIs demonstrated features of possible intracranial hypotension however she has no confirmed evidence of a CSF leak or any spinal trauma from which a leak could have resulted.  She did faint when she had her stroke but never has injured her spine.    Review of patient's allergies indicates:   Allergen Reactions    Pcn [penicillins] Other (See Comments)     unknown       Past Medical History:   Diagnosis Date    Anemia     Anticoagulated on Coumadin     Deep vein thrombosis     DVT (deep venous thrombosis)     GERD (gastroesophageal reflux disease)     Headache     Hypertension     Pulmonary embolism      Past Surgical History:   Procedure Laterality Date    DECOMPRESSION OF CHIARI MALFORMATION BY REMOVAL OF POSTERIOR ARCH OF FIRST CERVICAL VERTEBRA Bilateral 6/13/2024    Procedure: DECOMPRESSION, CHIARI MALFORMATION, BY 1ST CERVICAL VERTEBRA POSTERIOR ARCH REMOVAL;  Surgeon: Juan Ramon León DO;  Location: [facility] OR [address] [place] [address];  Service: Neurosurgery;  Laterality: Bilateral;  (Chiari decompression with autologous duraplasty)  Axel mars    PULMONARY EMBOLISM SURGERY      TUBAL LIGATION       Family History   Problem Relation Name Age of Onset    Arthritis Mother      Cancer Mother      Depression Mother      Early death Mother      Heart disease Mother      Hypertension Mother      Mental illness Mother      Miscarriages / Stillbirths Mother       Social History     Tobacco Use    Smoking status: Never     Passive exposure: Never    Smokeless tobacco: Never   Substance Use Topics    Alcohol use: No    Drug use: No        Review of Systems   Constitutional: Negative.    HENT:  Negative for  congestion, ear discharge, ear pain, hearing loss, nosebleeds, sinus pain and tinnitus.    Eyes:  Negative for blurred vision.   Respiratory: Negative.     Cardiovascular:  Negative for chest pain, palpitations, claudication and leg swelling.   Gastrointestinal:  Negative for abdominal pain, blood in stool, constipation, diarrhea, melena and vomiting.   Genitourinary:  Negative for flank pain, frequency and urgency.   Musculoskeletal:  Negative for falls.   Skin: Negative.    Neurological:  Negative for dizziness, tingling, tremors, sensory change, speech change, focal weakness, seizures, loss of consciousness, weakness and headaches.   Endo/Heme/Allergies:  Does not bruise/bleed easily.   Psychiatric/Behavioral:  The patient is not nervous/anxious.        OBJECTIVE:   No focal deficits  Wound stable, no active draining    FROM PRIOR VISIT:  Physical Exam:  Constitutional: Patient sitting comfortably in chair. Appears well developed and well nourished.  Skin: Exposed areas are intact without abnormal markings, rashes or other lesions.  HEENT: Normocephalic. Normal conjunctivae.  Cardiovascular: Normal rate and regular rhythm.  Respiratory: Chest wall rises and falls symmetrically, without signs of respiratory distress.  Abdomen: Soft and non-tender.  Extremities: Warm and without edema. Calves supple, non-tender.  Psych/Behavior: Normal affect.    Neurological:    Mental status: Alert and oriented. Conversational and appropriate.       Cranial Nerves: VFF to confrontation. PERRL. EOMI without nystagmus. Facial STLT normal and symmetric. Strong, symmetric muscles of mastication. Facial strength full and symmetric. Hearing equal bilaterally to finger rub. Palate and uvula rise and fall normally in midline. Shoulder shrug 5/5 strength. Tongue midline.     Motor:    Upper:  Deltoids Triceps Biceps WE WF     R 5/5 5/5 5/5 5/5 5/5 5/5    L 5/5 5/5 5/5 5/5 5/5 5/5      Lower:  HF KE KF DF PF EHL    R 5/5 5/5 5/5 5/5  5/5 5/5    L 5/5 5/5 5/5 5/5 5/5 5/5     Sensory: Intact sensation to light touch in all extremities. Romberg negative.    Reflexes:          DTR: 2+ symmetrically throughout.     Barksdale's: Negative.     Babinski's: Negative.     Clonus: Negative.    Cerebellar: Finger-to-nose and rapid alternating movements normal.     Gait stable    Incision:  Well healed.  No further evidence of drainage.    Diagnostic Results:  All imaging was independently reviewed by me.    HCT, 7/12/24:  Expected postop changes    HCT, 7/9/24:  Expected postop changes    MRI brain and cervical spine, dated 5/3 and 4/22/24:  1. Chiari 1 malformation w/ tonsillar descent approx 14mm  2. Crowing of FM  3. Cervical syrinx    Thoracic MRI, 4/22/24:  No evidence of CSF leak or dural defect    ASSESSMENT/PLAN:     Problem List Items Addressed This Visit          Neuro    Chiari malformation type I - Primary (Chronic)    Status post craniectomy    Syrinx of spinal cord       Patient is now 5 weeks status post Chiari decompression for large Chiari malformation.  She has recovered very well and continues to have excellent pain control with significant improvement in her Chiari type headaches.      Her wounds appears are healing well without any further drainage.  She will complete her abx on Saturday.     She was found to have extensive right lower extremity DVT and was successfully transitioned to her anticoagulation.  Her leg is no longer painful however it swells intermittently.  She should follow up with her PCP regarding continued management.    - follow-up with PCP regarding DVT  - complete doxycycline  - RTC in 2 weeks for wound re-check      The patient understands and agrees with the plan of care. All questions were answered.    Time spent on this encounter: 10 minutes. This includes face-to-face time and non-face to face time preparing to see the patient (eg, review of tests), obtaining and/or reviewing separately obtained history,  documenting clinical information in the electronic or other health record, independently interpreting results and communicating results to the patient/family/caregiver, or care coordinator.          .

## 2024-08-06 ENCOUNTER — OFFICE VISIT (OUTPATIENT)
Dept: NEUROSURGERY | Facility: CLINIC | Age: 54
End: 2024-08-06
Payer: COMMERCIAL

## 2024-08-06 DIAGNOSIS — G93.5 CHIARI MALFORMATION TYPE I: Primary | Chronic | ICD-10-CM

## 2024-08-06 DIAGNOSIS — Z98.890 STATUS POST CRANIECTOMY: ICD-10-CM

## 2024-08-06 DIAGNOSIS — G95.0 SYRINX OF SPINAL CORD: ICD-10-CM

## 2024-08-06 PROCEDURE — 4010F ACE/ARB THERAPY RXD/TAKEN: CPT | Mod: CPTII,95,, | Performed by: NEUROLOGICAL SURGERY

## 2024-08-06 PROCEDURE — 3044F HG A1C LEVEL LT 7.0%: CPT | Mod: CPTII,95,, | Performed by: NEUROLOGICAL SURGERY

## 2024-08-06 PROCEDURE — 99024 POSTOP FOLLOW-UP VISIT: CPT | Mod: 95,,, | Performed by: NEUROLOGICAL SURGERY

## 2024-08-06 PROCEDURE — 1159F MED LIST DOCD IN RCRD: CPT | Mod: CPTII,95,, | Performed by: NEUROLOGICAL SURGERY

## 2024-08-06 PROCEDURE — 1160F RVW MEDS BY RX/DR IN RCRD: CPT | Mod: CPTII,95,, | Performed by: NEUROLOGICAL SURGERY

## 2024-08-28 ENCOUNTER — INITIAL CONSULT (OUTPATIENT)
Dept: VASCULAR SURGERY | Facility: CLINIC | Age: 54
End: 2024-08-28
Payer: COMMERCIAL

## 2024-08-28 ENCOUNTER — TELEPHONE (OUTPATIENT)
Dept: VASCULAR SURGERY | Facility: CLINIC | Age: 54
End: 2024-08-28

## 2024-08-28 VITALS
BODY MASS INDEX: 27.09 KG/M2 | WEIGHT: 172.63 LBS | HEIGHT: 67 IN | HEART RATE: 82 BPM | SYSTOLIC BLOOD PRESSURE: 161 MMHG | DIASTOLIC BLOOD PRESSURE: 107 MMHG

## 2024-08-28 DIAGNOSIS — I82.411 ACUTE DEEP VEIN THROMBOSIS (DVT) OF FEMORAL VEIN OF RIGHT LOWER EXTREMITY: Primary | ICD-10-CM

## 2024-08-28 PROCEDURE — 99999 PR PBB SHADOW E&M-EST. PATIENT-LVL III: CPT | Mod: PBBFAC,,, | Performed by: SURGERY

## 2024-08-28 NOTE — PROGRESS NOTES
Abdirashid Marina MD, RPVI                                 [facility]                           [facility]                             08/28/2024    HPI:  Elva Burroughs is a 53 y.o. female with   Patient Active Problem List   Diagnosis    Chronic deep vein thrombosis (DVT) of popliteal vein of left lower extremity    Essential hypertension    Chronic tension-type headache, not intractable    Obstructive sleep apnea (adult) (pediatric)    Overweight with body mass index (BMI) of 26 to 26.9 in adult    Hx of cerebral infarction    Chiari malformation type I    Encounter to discuss treatment options    Lymphadenopathy    Elevated serum globulin level    Iron deficiency anemia secondary to inadequate dietary iron intake    Encounter for education    Multiple thyroid nodules    Other cerebral infarction    Dizziness    Radiologically isolated syndrome    Syrinx of spinal cord    History of COVID-19    Long term (current) use of antithrombotics/antiplatelets    Acid reflux    History of menorrhagia    Edema    Altered mental status    Status post craniectomy    Acute deep vein thrombosis (DVT) of right lower extremity    being managed by PCP and specialists who is here today for evaluation of RLE DVT 6/2024 postop provoked from NSGY.  Patient states location is RLE occurring for months.  Associated signs and symptoms include edema.  Quality is dull and severity is 2/10.  Symptoms began mo ago.  Alleviating factors include elevation.  Worsening factors include dependency.  Patient has not been wearing compression stockings for greater than 3 months.  +FH of venous disease.  Symptoms do not limit patient's functional status and daily activities.  + LLE DVT /PE (PE thrombectomy) history.  No BLE venous interventions.  no low sodium diet.  no excessive water intake.    Migraine with aura: no  PFO/ASD/right to left shunt: no  Pregnant: no  Breastfeeding: no    no MI  no Stroke  Tobacco  use: denies    Past Medical History:   Diagnosis Date    Anemia     Anticoagulated on Coumadin     Deep vein thrombosis     DVT (deep venous thrombosis)     GERD (gastroesophageal reflux disease)     Headache     Hypertension     Pulmonary embolism      Past Surgical History:   Procedure Laterality Date    DECOMPRESSION OF CHIARI MALFORMATION BY REMOVAL OF POSTERIOR ARCH OF FIRST CERVICAL VERTEBRA Bilateral 6/13/2024    Procedure: DECOMPRESSION, CHIARI MALFORMATION, BY 1ST CERVICAL VERTEBRA POSTERIOR ARCH REMOVAL;  Surgeon: Juan Ramon León DO;  Location: [facility] OR [address] [place] [address];  Service: Neurosurgery;  Laterality: Bilateral;  (Chiari decompression with autologous duraplasty)  Axel mars    PULMONARY EMBOLISM SURGERY      TUBAL LIGATION       Family History   Problem Relation Name Age of Onset    Arthritis Mother      Cancer Mother      Depression Mother      Early death Mother      Heart disease Mother      Hypertension Mother      Mental illness Mother      Miscarriages / Stillbirths Mother       Social History     Socioeconomic History    Marital status:    Tobacco Use    Smoking status: Never     Passive exposure: Never    Smokeless tobacco: Never   Substance and Sexual Activity    Alcohol use: No    Drug use: No    Sexual activity: Yes     Partners: Male     Social Determinants of Health     Financial Resource Strain: Low Risk  (7/11/2024)    Overall Financial Resource Strain (CARDIA)     Difficulty of Paying Living Expenses: Not very hard   Food Insecurity: No Food Insecurity (7/11/2024)    Hunger Vital Sign     Worried About Running Out of Food in the Last Year: Never true     Ran Out of Food in the Last Year: Never true   Transportation Needs: No Transportation Needs (7/11/2024)    TRANSPORTATION NEEDS     Transportation : No   Physical Activity: Unknown (4/4/2024)    Exercise Vital Sign     Minutes of Exercise per Session: 20 min   Stress: No Stress Concern Present (7/11/2024)    [employer] - Occupational Stress Questionnaire     Feeling of Stress : Not at all   Housing Stability: Low Risk  (7/11/2024)    Housing Stability Vital Sign     Unable to Pay for Housing in the Last Year: No     Homeless in the Last Year: No       Current Outpatient Medications:     acetaminophen (TYLENOL) 500 MG tablet, Take 1,000 mg by mouth daily as needed for Pain., Disp: , Rfl:     apixaban (ELIQUIS) 5 mg Tab, Take 2 tablets (10 mg total) by mouth 2 (two) times daily for 7 days, THEN 1 tablet (5 mg total) 2 (two) times daily., Disp: 194 tablet, Rfl: 3    ascorbic acid, vitamin C, (VITAMIN C) 500 MG tablet, Take 500 mg by mouth once daily., Disp: , Rfl:     famotidine (PEPCID) 40 MG tablet, Take 1 tablet (40 mg total) by mouth once daily., Disp: 30 tablet, Rfl: 5    ferrous sulfate (IRON) 325 mg (65 mg iron) Tab tablet, Take 325 mg by mouth once daily., Disp: , Rfl:     metoprolol succinate (TOPROL-XL) 25 MG 24 hr tablet, Take 1 tablet (25 mg total) by mouth once daily., Disp: 90 tablet, Rfl: 3    NIFEdipine (PROCARDIA-XL) 60 MG (OSM) 24 hr tablet, Take 1 tablet (60 mg total) by mouth once daily., Disp: 90 tablet, Rfl: 3    REVIEW OF SYSTEMS:  General: No fevers or chills; ENT: No sore throat; Allergy and Immunology: no persistent infections; Hematological and Lymphatic: No history of bleeding or easy bruising; Endocrine: negative; Respiratory: no cough, shortness of breath, or wheezing; Cardiovascular: no chest pain or dyspnea on exertion; Gastrointestinal: no abdominal pain/back, change in bowel habits, or bloody stools; Genito-Urinary: no dysuria, trouble voiding, or hematuria; Musculoskeletal: edema; Neurological: no TIA or stroke symptoms; Psychiatric: no nervousness, anxiety or depression.    PHYSICAL EXAM:      Pulse: 82         General appearance:  Alert, well-appearing, and in no distress.  Oriented to person, place, and time                    Neurological: Normal speech, no focal  "findings noted; CN II - XII grossly intact. RLE with sensation to light touch, LLE with sensation to light touch.            Musculoskeletal: Digits/nail without cyanosis/clubbing.  Strength 5/5 BLE.                    Neck: Supple, no significant adenopathy                  Chest:  No wheezes, symmetric air entry. No use of accessory muscles               Cardiac: Normal rate and regular rhythm            Abdomen: Soft, nontender, nondistended      Extremities:   2+ R DP pulse, 2+ L DP pulse      1+ RLE edema, 1+ LLE edema    Skin:  RLE no ulcer; LLE no ulcer      RLE no spider veins, LLE no spider veins      RLE no varicose veins, LLE no varicose veins    CEAP 3/3    VCSS 2    LAB RESULTS:  No results found for: "CBC"  Lab Results   Component Value Date    LABPROT 13.0 (H) 07/13/2024    INR 1.2 07/13/2024     Lab Results   Component Value Date     07/14/2024    K 3.7 07/14/2024     07/14/2024    CO2 28 07/14/2024    GLU 99 07/14/2024    BUN 11 07/14/2024    CREATININE 0.8 07/14/2024    CALCIUM 9.1 07/14/2024    ANIONGAP 9 07/14/2024    EGFRNONAA >60 07/11/2022     Lab Results   Component Value Date    WBC 6.56 07/14/2024    RBC 4.27 07/14/2024    HGB 11.2 (L) 07/14/2024    HCT 37.4 07/14/2024    MCV 88 07/14/2024    MCH 26.2 (L) 07/14/2024    MCHC 29.9 (L) 07/14/2024    RDW 13.6 07/14/2024     07/14/2024    MPV 9.6 07/14/2024    GRAN 3.5 07/14/2024    GRAN 53.7 07/14/2024    LYMPH 2.4 07/14/2024    LYMPH 36.3 07/14/2024    MONO 0.5 07/14/2024    MONO 7.3 07/14/2024    EOS 0.1 07/14/2024    BASO 0.03 07/14/2024    EOSINOPHIL 1.7 07/14/2024    BASOPHIL 0.5 07/14/2024    DIFFMETHOD Automated 07/14/2024     .  Lab Results   Component Value Date    HGBA1C 5.5 05/31/2024       IMAGING:  All pertinent imaging has been reviewed and interpreted independently.    Venous US 7/2024 Impression:    FINDINGS:  RIGHT LEG:     Extensive occlusive thrombus is noted throughout the veins of the right lower " extremity including the right common femoral vein, right superficial femoral vein, right popliteal vein, right peroneal veins, right anterior tibial veins, and right posterior tibial veins.  The right greater saphenous vein appears patent.     LEFT LEG:     The left common femoral vein was assessed for comparison and appears patent.     Impression:     Extensive occlusive thrombus is noted throughout the veins of the right lower extremity as above.       IMP/PLAN:  53 y.o. female with   Patient Active Problem List   Diagnosis    Chronic deep vein thrombosis (DVT) of popliteal vein of left lower extremity    Essential hypertension    Chronic tension-type headache, not intractable    Obstructive sleep apnea (adult) (pediatric)    Overweight with body mass index (BMI) of 26 to 26.9 in adult    Hx of cerebral infarction    Chiari malformation type I    Encounter to discuss treatment options    Lymphadenopathy    Elevated serum globulin level    Iron deficiency anemia secondary to inadequate dietary iron intake    Encounter for education    Multiple thyroid nodules    Other cerebral infarction    Dizziness    Radiologically isolated syndrome    Syrinx of spinal cord    History of COVID-19    Long term (current) use of antithrombotics/antiplatelets    Acid reflux    History of menorrhagia    Edema    Altered mental status    Status post craniectomy    Acute deep vein thrombosis (DVT) of right lower extremity    being managed by PCP and specialists who is here today for evaluation of RLE DVT.    -Heme Onc eval  -recommend compression with Rx stockings, elevation, dietary changes associated with water and sodium intake discussed at length with patient  -Exercise   -RTC 3 months for further evaluation    I spent 12 minutes evaluating this patient and greater than 50% of the time was spent counseling, coordinator care and discussing the plan of care.  All questions were answered and patient stated understanding with agreement  with the above treatment plan.    Abdirashid Marina MD RPVI  Vascular and Endovascular Surgery

## 2024-09-06 ENCOUNTER — OFFICE VISIT (OUTPATIENT)
Dept: HEMATOLOGY/ONCOLOGY | Facility: CLINIC | Age: 54
End: 2024-09-06
Payer: COMMERCIAL

## 2024-09-06 DIAGNOSIS — Z12.11 COLON CANCER SCREENING: ICD-10-CM

## 2024-09-06 DIAGNOSIS — I82.411 ACUTE DEEP VEIN THROMBOSIS (DVT) OF FEMORAL VEIN OF RIGHT LOWER EXTREMITY: Primary | ICD-10-CM

## 2024-09-06 NOTE — PROGRESS NOTES
PATIENT: Elva Burroughs  MRN: [id number]  DATE: 9/6/2024    Diagnosis:   1. Acute deep vein thrombosis (DVT) of femoral vein of right lower extremity    2. Colon cancer screening        Chief Complaint: No chief complaint on file.      Oncologic History:   Previously documented heme history documented by Dr. Jha 7/2023  51 y/o  female pt with pmhx of LLE DVT, PE, stroke, seizures, chiari 1 malformation and small cervical syrinx likely related to this, no clinical symptoms who was referred for hypercoagulable state work up. In the 1990's she had history of PE treated with coumadin then stopped. Recurrent VTE happened around 2015 with left lower extremity DVT, she was put back on coumadin, she hasn't stopped it since then. While on coumadin in 2022 she developed weakness. MRI brain was positive for frontal stroke. 05/2022 CT head negative for bleeding, positive for stroke. US neck positive for 2x2 cm right neck lymph nodes. Family history is positive for multiple first degree family members with VTE <45 years old. He denied exposure to chemicals/radiation/drinking alcohol or smoking.      Interim history:  -06/2022 hypercoagulable state work including FV Leiden mutation, PGM, Antiphospholipid syndrome antibodies, antithrombin III deficiency, PNH profile, Francisco 2 mutation and MPN panel all unremarkable   -06/2022 Protein s/c deficiency noted but that can be caused while on coumadin    -06/2022 CT neck/thyroid US consistent with thyroid nodules and cervical lymph nodes s/p biopsy of right cervical lymph node->negative for malignancy   -07/2023 thyroid US with stable nodules; labs at baseline; inr at goal  -Ecog score 1. Pain score 0             Subjective:      History of Present Illness: Ms. Burroughs is a 54 y.o. female participates remotely in telemedicine evaluation for management of recent DVT. She had history of extensive thrombosis as noted above. Anticoagulation was held for a  neurosurgical procedure and subsequently she developed a DVT. She has been started on Eliquis and is tolerating that well. She states that symptoms related to the DVT have since resolved. She is aware that she will require lifelong anticoagulation. We reviewed age-appropriate cancer screening and she does wish to proceed with cologuard to colon cancer screening. She is up-to-date on mammography. Never smoker.       Past medical, surgical, family, and social histories have been reviewed and updated below.    Past Medical History:   Past Medical History:   Diagnosis Date    Anemia     Anticoagulated on Coumadin     Deep vein thrombosis     DVT (deep venous thrombosis)     GERD (gastroesophageal reflux disease)     Headache     Hypertension     Pulmonary embolism        Past Surgical History:   Past Surgical History:   Procedure Laterality Date    DECOMPRESSION OF CHIARI MALFORMATION BY REMOVAL OF POSTERIOR ARCH OF FIRST CERVICAL VERTEBRA Bilateral 6/13/2024    Procedure: DECOMPRESSION, CHIARI MALFORMATION, BY 1ST CERVICAL VERTEBRA POSTERIOR ARCH REMOVAL;  Surgeon: Juan Ramon León DO;  Location: [facility] OR [address] [place] [address];  Service: Neurosurgery;  Laterality: Bilateral;  (Chiari decompression with autologous duraplasty)  Axel mars    PULMONARY EMBOLISM SURGERY      TUBAL LIGATION         Family History:   Family History   Problem Relation Name Age of Onset    Arthritis Mother      Cancer Mother      Depression Mother      Early death Mother      Heart disease Mother      Hypertension Mother      Mental illness Mother      Miscarriages / Stillbirths Mother         Social History:  reports that she has never smoked. She has never been exposed to tobacco smoke. She has never used smokeless tobacco. She reports that she does not drink alcohol and does not use drugs.    Allergies:  Review of patient's allergies indicates:   Allergen Reactions    Pcn [penicillins] Other (See Comments)     unknown        Medications:  Current Outpatient Medications   Medication Sig Dispense Refill    acetaminophen (TYLENOL) 500 MG tablet Take 1,000 mg by mouth daily as needed for Pain.      apixaban (ELIQUIS) 5 mg Tab Take 2 tablets (10 mg total) by mouth 2 (two) times daily for 7 days, THEN 1 tablet (5 mg total) 2 (two) times daily. 194 tablet 3    ascorbic acid, vitamin C, (VITAMIN C) 500 MG tablet Take 500 mg by mouth once daily.      famotidine (PEPCID) 40 MG tablet Take 1 tablet (40 mg total) by mouth once daily. 30 tablet 5    ferrous sulfate (IRON) 325 mg (65 mg iron) Tab tablet Take 325 mg by mouth once daily.      metoprolol succinate (TOPROL-XL) 25 MG 24 hr tablet Take 1 tablet (25 mg total) by mouth once daily. 90 tablet 3    NIFEdipine (PROCARDIA-XL) 60 MG (OSM) 24 hr tablet Take 1 tablet (60 mg total) by mouth once daily. 90 tablet 3     No current facility-administered medications for this visit.       Objective:      Vitals: There were no vitals filed for this visit.  BMI: There is no height or weight on file to calculate BMI.    Physical Exam  No physical exam due to remote nature of the visit.'      Laboratory Data:  Labs have been reviewed.      Assessment/Plan:       1. Acute deep vein thrombosis (DVT) of femoral vein of right lower extremity    2. Colon cancer screening      She has chronic thrombophilia and would need indefinite anticoagulation. She is doing well with Eliquis and we discussed that this is an appropriate option for indefinite anticoagulation. Also discussed that Xarelto would be a reasonable option if she preferred daily but she would prefer to remain on eliquis. She is overdue for colon cancer screening--will send cologuard.        Orders Placed This Encounter    Cologuard Screening (Multitarget Stool DNA)          Robb Chatman MD, FACP  Hematology/Oncology  [facility] - [address], [address]  [place] [address]  Phone: [phone]  Fax: [phone]

## 2024-11-22 ENCOUNTER — TELEPHONE (OUTPATIENT)
Dept: NEUROSURGERY | Facility: CLINIC | Age: 54
End: 2024-11-22
Payer: COMMERCIAL

## 2024-11-27 ENCOUNTER — OFFICE VISIT (OUTPATIENT)
Dept: NEUROSURGERY | Facility: CLINIC | Age: 54
End: 2024-11-27
Payer: COMMERCIAL

## 2024-11-27 VITALS — SYSTOLIC BLOOD PRESSURE: 141 MMHG | HEART RATE: 81 BPM | DIASTOLIC BLOOD PRESSURE: 80 MMHG

## 2024-11-27 DIAGNOSIS — G93.5 CHIARI MALFORMATION TYPE I: Primary | Chronic | ICD-10-CM

## 2024-11-27 DIAGNOSIS — G95.0 SYRINX OF SPINAL CORD: ICD-10-CM

## 2024-11-27 PROCEDURE — 3044F HG A1C LEVEL LT 7.0%: CPT | Mod: CPTII,S$GLB,, | Performed by: NEUROLOGICAL SURGERY

## 2024-11-27 PROCEDURE — 99999 PR PBB SHADOW E&M-EST. PATIENT-LVL III: CPT | Mod: PBBFAC,,, | Performed by: NEUROLOGICAL SURGERY

## 2024-11-27 PROCEDURE — 1160F RVW MEDS BY RX/DR IN RCRD: CPT | Mod: CPTII,S$GLB,, | Performed by: NEUROLOGICAL SURGERY

## 2024-11-27 PROCEDURE — 1159F MED LIST DOCD IN RCRD: CPT | Mod: CPTII,S$GLB,, | Performed by: NEUROLOGICAL SURGERY

## 2024-11-27 PROCEDURE — 3077F SYST BP >= 140 MM HG: CPT | Mod: CPTII,S$GLB,, | Performed by: NEUROLOGICAL SURGERY

## 2024-11-27 PROCEDURE — 3079F DIAST BP 80-89 MM HG: CPT | Mod: CPTII,S$GLB,, | Performed by: NEUROLOGICAL SURGERY

## 2024-11-27 PROCEDURE — 4010F ACE/ARB THERAPY RXD/TAKEN: CPT | Mod: CPTII,S$GLB,, | Performed by: NEUROLOGICAL SURGERY

## 2024-11-27 PROCEDURE — 99215 OFFICE O/P EST HI 40 MIN: CPT | Mod: S$GLB,,, | Performed by: NEUROLOGICAL SURGERY

## 2024-11-29 NOTE — PROGRESS NOTES
CHIEF COMPLAINT:  Chiari malformation s/p decompression (5m postop)    INTERVAL HISTORY (11/27/24):  Now 5m postop.  Doing well from HA standpoint - gets them every now and then and more frontal and around eyes. Get better with tylenol.  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    She reports tightness in her neck and imbalance which is worse when bending over (thinks since August).    She also reports blurry vision all the time. She saw ophtho at [facility] over 1 year ago who did not find any issues.    INTERVAL HISTORY (8/6/24):  No further drainage or signs of infection at her wounds.  Still thinks surgery was successful - headaches improved.  Occasionally notices some neck puffiness/raised at center of wound that goes away quickly after applying ice.      INTERVAL HISTORY (7/17/24):  Patient presents virtually for 1 week wound check.  After last visit, patient was found to have extensive right lower extremity DVT on ultrasound and was sent to the emergency room where she was placed on appropriate anticoagulation.  She had some drainage from her superior wound and was transferred to [facility] where the neurosurgery team evaluated her.  She was neurologically doing well and the wound was no longer draining.  She we will complete her antibiotics on Saturday.  She states that her wound has not had any further drainage.  Overall she is doing well and has transitioned back to her Coumadin.  Her right lower extremity intermittently swells but is not painful.    INTERVAL HISTORY (7/10/24):  Patient is now 4 weeks postop.  She was requested to return to clinic for continued monitoring of her wound.  She was placed on doxycycline x2 weeks.  She presents with her  and both report that the wound drained a little bit for a couple days and then stopped.  Appears to have sealed itself and she does not report any further issues.  She has not had any return of her headaches and reports that her  anxiety levels are decreased.    Reports right medial thigh pain that is similar to when she had DVT.  She is still taking lovenox injections and has not transitioned back to coumadin.  She still needs to contact her PCP and states she will today.    INTERVAL HISTORY (7/3/24):  Patient is now 2-3 weeks status post suboccipital craniectomy for decompression of Chiari malformation on 6/13/24.  She recovered remarkably well and was discharged on postop day 2.  She had good pain control and reported significant improvement in her headaches.  She has been doing well at home however reports that she has had some less severe headaches over the past 1-2 days with some imbalance and blurry vision.  She attributes this to anxiety as does her .  They report some scant drainage from the inferior aspect of the wound that is was 1st noticed yesterday.  She denies any fevers, chills, or any other signs of infection.  She denies any significant surgical site pain but acknowledges that her neck is stiff.  She did fine muscle relaxers helpful but has run out.    HPI:  Elva Burroughs is a 54 y.o.  female with below PMH including RIS and prior strokes/TIA for which she is on warfarin and ASA, who is referred to me by Dr Sutherland for evaluation of chiari malformation.  She reports headaches that are frontal and occipital in location.  Her headaches used to be daily but now occur from every other week to 1-2 times per week.  She finds that Tylenol and aspirin have been helpful.  She reports that coughing and bending over intensify her headaches.  She does not think that her headaches are positional however she finds that resting or lying down will help her headaches.    She reports imbalance and gait disturbance.    She also reports dizziness and blurry vision.  She has been evaluated by Ophthalmology who reports that she has 2020 vision and does not have any papilledema.  The source of her blurry vision remains unclear.       Whenever her recent MRIs demonstrated features of possible intracranial hypotension however she has no confirmed evidence of a CSF leak or any spinal trauma from which a leak could have resulted.  She did faint when she had her stroke but never has injured her spine.    Review of patient's allergies indicates:   Allergen Reactions    Pcn [penicillins] Other (See Comments)     unknown       Past Medical History:   Diagnosis Date    Anemia     Anticoagulated on Coumadin     Deep vein thrombosis     DVT (deep venous thrombosis)     GERD (gastroesophageal reflux disease)     Headache     Hypertension     Pulmonary embolism      Past Surgical History:   Procedure Laterality Date    DECOMPRESSION OF CHIARI MALFORMATION BY REMOVAL OF POSTERIOR ARCH OF FIRST CERVICAL VERTEBRA Bilateral 6/13/2024    Procedure: DECOMPRESSION, CHIARI MALFORMATION, BY 1ST CERVICAL VERTEBRA POSTERIOR ARCH REMOVAL;  Surgeon: Juan Ramon León DO;  Location: [facility] OR [address] [place] [address];  Service: Neurosurgery;  Laterality: Bilateral;  (Chiari decompression with autologous duraplasty)  Axel mars    PULMONARY EMBOLISM SURGERY      TUBAL LIGATION       Family History   Problem Relation Name Age of Onset    Arthritis Mother      Cancer Mother      Depression Mother      Early death Mother      Heart disease Mother      Hypertension Mother      Mental illness Mother      Miscarriages / Stillbirths Mother       Social History     Tobacco Use    Smoking status: Never     Passive exposure: Never    Smokeless tobacco: Never   Substance Use Topics    Alcohol use: No    Drug use: No        Review of Systems   Constitutional: Negative.    HENT:  Negative for congestion, ear discharge, ear pain, hearing loss, nosebleeds, sinus pain and tinnitus.    Eyes:  Negative for blurred vision.   Respiratory: Negative.     Cardiovascular:  Negative for chest pain, palpitations, claudication and leg swelling.   Gastrointestinal:  Negative for abdominal pain, blood in  stool, constipation, diarrhea, melena and vomiting.   Genitourinary:  Negative for flank pain, frequency and urgency.   Musculoskeletal:  Negative for falls.   Skin: Negative.    Neurological:  Negative for dizziness, tingling, tremors, sensory change, speech change, focal weakness, seizures, loss of consciousness, weakness and headaches.   Endo/Heme/Allergies:  Does not bruise/bleed easily.   Psychiatric/Behavioral:  The patient is not nervous/anxious.        OBJECTIVE:   Physical Exam:  Constitutional: Patient sitting comfortably in chair. Appears well developed and well nourished.  Skin: Exposed areas are intact without abnormal markings, rashes or other lesions.  HEENT: Normocephalic. Normal conjunctivae.  Cardiovascular: Normal rate and regular rhythm.  Respiratory: Chest wall rises and falls symmetrically, without signs of respiratory distress.  Abdomen: Soft and non-tender.  Extremities: Warm and without edema. Calves supple, non-tender.  Psych/Behavior: Normal affect.    Neurological:  Mental status: Alert and oriented. Conversational and appropriate.       Cranial Nerves: VFF to confrontation. PERRL. EOMI without nystagmus. Facial STLT normal and symmetric. Strong, symmetric muscles of mastication. Facial strength full and symmetric. Hearing equal bilaterally to finger rub. Palate and uvula rise and fall normally in midline. Shoulder shrug 5/5 strength. Tongue midline.     Motor:    Upper:  Deltoids Triceps Biceps WE WF     R 5/5 5/5 5/5 5/5 5/5 5/5    L 5/5 5/5 5/5 5/5 5/5 5/5      Lower:  HF KE KF DF PF EHL    R 5/5 5/5 5/5 5/5 5/5 5/5    L 5/5 5/5 5/5 5/5 5/5 5/5     Sensory: Intact sensation to light touch in all extremities. Romberg negative.    Reflexes:          DTR: 2+ symmetrically throughout.     Barksdale's: Negative.     Babinski's: Negative.     Clonus: Negative.    Cerebellar: Finger-to-nose and rapid alternating movements normal.     Gait: some mild imbalance    Incision:  Well  healed    Diagnostic Results:  All imaging was independently reviewed by me.    Cervical MRI, 11/25/24:  Tonsils within postop fossa  Syrinx decreased in size vs previous    HCT, 7/12/24:  Expected postop changes    HCT, 7/9/24:  Expected postop changes    MRI brain and cervical spine, dated 5/3 and 4/22/24:  1. Chiari 1 malformation w/ tonsillar descent approx 14mm  2. Crowing of FM  3. Cervical syrinx    Thoracic MRI, 4/22/24:  No evidence of CSF leak or dural defect    ASSESSMENT/PLAN:     Problem List Items Addressed This Visit          Neuro    Chiari malformation type I - Primary (Chronic)    Relevant Orders    MRI Brain W WO Contrast    Ambulatory referral/consult to Ophthalmology    Syrinx of spinal cord    Relevant Orders    MRI Brain W WO Contrast    Ambulatory referral/consult to Ophthalmology         Patient is now 5m status post Chiari decompression for large Chiari malformation.  She has recovered very well and continues to have significant improvement in her Chiari type headaches.      Her main complaints are blurry vision and gait imbalance.  I do not have clear explanation for vision changes and would like her to evaluated by ophtho.  Her cervical syrinx is decreased in size but her imbalance could be related to this residual or due to demyelinating condition.    - Referral to Dr Sadler (opho)  - F/u with neurology as scheduled  - Ordered updated Brain MRI    The patient understands and agrees with the plan of care. All questions were answered.    Time spent on this encounter: 40 minutes. This includes face-to-face time and non-face to face time preparing to see the patient (eg, review of tests), obtaining and/or reviewing separately obtained history, documenting clinical information in the electronic or other health record, independently interpreting results and communicating results to the patient/family/caregiver, or care coordinator.          .

## 2024-12-02 ENCOUNTER — TELEPHONE (OUTPATIENT)
Dept: OPHTHALMOLOGY | Facility: CLINIC | Age: 54
End: 2024-12-02
Payer: COMMERCIAL

## 2024-12-05 ENCOUNTER — TELEPHONE (OUTPATIENT)
Dept: OPHTHALMOLOGY | Facility: CLINIC | Age: 54
End: 2024-12-05
Payer: COMMERCIAL

## 2024-12-05 ENCOUNTER — OFFICE VISIT (OUTPATIENT)
Dept: NEUROLOGY | Facility: CLINIC | Age: 54
End: 2024-12-05
Payer: COMMERCIAL

## 2024-12-05 ENCOUNTER — LAB VISIT (OUTPATIENT)
Dept: LAB | Facility: HOSPITAL | Age: 54
End: 2024-12-05
Attending: STUDENT IN AN ORGANIZED HEALTH CARE EDUCATION/TRAINING PROGRAM
Payer: COMMERCIAL

## 2024-12-05 VITALS
BODY MASS INDEX: 27.04 KG/M2 | HEIGHT: 67 IN | HEART RATE: 108 BPM | DIASTOLIC BLOOD PRESSURE: 83 MMHG | SYSTOLIC BLOOD PRESSURE: 123 MMHG

## 2024-12-05 DIAGNOSIS — R93.0 RADIOLOGICALLY ISOLATED SYNDROME: ICD-10-CM

## 2024-12-05 DIAGNOSIS — R93.0 RADIOLOGICALLY ISOLATED SYNDROME: Primary | ICD-10-CM

## 2024-12-05 LAB — 25(OH)D3+25(OH)D2 SERPL-MCNC: 8 NG/ML (ref 30–96)

## 2024-12-05 PROCEDURE — 84446 ASSAY OF VITAMIN E: CPT | Performed by: STUDENT IN AN ORGANIZED HEALTH CARE EDUCATION/TRAINING PROGRAM

## 2024-12-05 PROCEDURE — 83520 IMMUNOASSAY QUANT NOS NONAB: CPT | Performed by: STUDENT IN AN ORGANIZED HEALTH CARE EDUCATION/TRAINING PROGRAM

## 2024-12-05 PROCEDURE — 84165 PROTEIN E-PHORESIS SERUM: CPT | Mod: 26,,, | Performed by: PATHOLOGY

## 2024-12-05 PROCEDURE — G2211 COMPLEX E/M VISIT ADD ON: HCPCS | Mod: S$GLB,,, | Performed by: STUDENT IN AN ORGANIZED HEALTH CARE EDUCATION/TRAINING PROGRAM

## 2024-12-05 PROCEDURE — 82306 VITAMIN D 25 HYDROXY: CPT | Performed by: STUDENT IN AN ORGANIZED HEALTH CARE EDUCATION/TRAINING PROGRAM

## 2024-12-05 PROCEDURE — 82607 VITAMIN B-12: CPT | Performed by: STUDENT IN AN ORGANIZED HEALTH CARE EDUCATION/TRAINING PROGRAM

## 2024-12-05 PROCEDURE — 0361U NEURFLMNT LT CHN DIG IA QUAN: CPT | Performed by: STUDENT IN AN ORGANIZED HEALTH CARE EDUCATION/TRAINING PROGRAM

## 2024-12-05 PROCEDURE — 3008F BODY MASS INDEX DOCD: CPT | Mod: CPTII,S$GLB,, | Performed by: STUDENT IN AN ORGANIZED HEALTH CARE EDUCATION/TRAINING PROGRAM

## 2024-12-05 PROCEDURE — 3079F DIAST BP 80-89 MM HG: CPT | Mod: CPTII,S$GLB,, | Performed by: STUDENT IN AN ORGANIZED HEALTH CARE EDUCATION/TRAINING PROGRAM

## 2024-12-05 PROCEDURE — 84165 PROTEIN E-PHORESIS SERUM: CPT | Performed by: STUDENT IN AN ORGANIZED HEALTH CARE EDUCATION/TRAINING PROGRAM

## 2024-12-05 PROCEDURE — 36415 COLL VENOUS BLD VENIPUNCTURE: CPT | Performed by: STUDENT IN AN ORGANIZED HEALTH CARE EDUCATION/TRAINING PROGRAM

## 2024-12-05 PROCEDURE — 1159F MED LIST DOCD IN RCRD: CPT | Mod: CPTII,S$GLB,, | Performed by: STUDENT IN AN ORGANIZED HEALTH CARE EDUCATION/TRAINING PROGRAM

## 2024-12-05 PROCEDURE — 82525 ASSAY OF COPPER: CPT | Performed by: STUDENT IN AN ORGANIZED HEALTH CARE EDUCATION/TRAINING PROGRAM

## 2024-12-05 PROCEDURE — 84207 ASSAY OF VITAMIN B-6: CPT | Performed by: STUDENT IN AN ORGANIZED HEALTH CARE EDUCATION/TRAINING PROGRAM

## 2024-12-05 PROCEDURE — 99214 OFFICE O/P EST MOD 30 MIN: CPT | Mod: S$GLB,,, | Performed by: STUDENT IN AN ORGANIZED HEALTH CARE EDUCATION/TRAINING PROGRAM

## 2024-12-05 PROCEDURE — 3074F SYST BP LT 130 MM HG: CPT | Mod: CPTII,S$GLB,, | Performed by: STUDENT IN AN ORGANIZED HEALTH CARE EDUCATION/TRAINING PROGRAM

## 2024-12-05 PROCEDURE — 83921 ORGANIC ACID SINGLE QUANT: CPT | Performed by: STUDENT IN AN ORGANIZED HEALTH CARE EDUCATION/TRAINING PROGRAM

## 2024-12-05 PROCEDURE — 99999 PR PBB SHADOW E&M-EST. PATIENT-LVL III: CPT | Mod: PBBFAC,,, | Performed by: STUDENT IN AN ORGANIZED HEALTH CARE EDUCATION/TRAINING PROGRAM

## 2024-12-05 PROCEDURE — 4010F ACE/ARB THERAPY RXD/TAKEN: CPT | Mod: CPTII,S$GLB,, | Performed by: STUDENT IN AN ORGANIZED HEALTH CARE EDUCATION/TRAINING PROGRAM

## 2024-12-05 PROCEDURE — 3044F HG A1C LEVEL LT 7.0%: CPT | Mod: CPTII,S$GLB,, | Performed by: STUDENT IN AN ORGANIZED HEALTH CARE EDUCATION/TRAINING PROGRAM

## 2024-12-05 RX ORDER — ONDANSETRON 4 MG/1
4 TABLET, ORALLY DISINTEGRATING ORAL EVERY 8 HOURS PRN
Qty: 30 TABLET | Refills: 2 | Status: SHIPPED | OUTPATIENT
Start: 2024-12-05

## 2024-12-05 NOTE — PROGRESS NOTES
[facility]  Follow Up Patient Visit      Disease Summary     Principle neurological diagnosis: RIS   Date of symptom onset: NA  Date of diagnosis: 5/9/2024  Disease type at diagnosis: RIS  Disease type currently: RIS  Previous therapy: NA  Current therapy: NA  Last MRI Brain: 5/3/2024 - stable with lesions indicative of demyelination   Last MRI C-spine: 11/25/24 - no lesions, but syrinx present   Last MRI T-spine: 4/22/2024 - no lesions.  CSF: NA  JCV status: NA  Other relevant labs and tests: 4/4/2024: NMO and MOG - negative, NfL - 16.5    Interval history:   She is currently 5 month postop from Chiari malformation status post decompression by Dr. Rae in Neurosurgery.  There has been improvement in her headaches.  She has returned to work as a teacher.  MRI C-spine 11/25/24 with stable post-op changes. She still has a syrinx.     She is pending an evaluation by Dr. Sadler for blurry vision.  She also has imbalance issues, almost had a fall and was nauseous. Denies weakness, denies sensory issues.     ROS:     SOCIAL HISTORY  Living arrangements - the patient lives with their family.  Social History     Socioeconomic History    Marital status:    Tobacco Use    Smoking status: Never     Passive exposure: Never    Smokeless tobacco: Never   Substance and Sexual Activity    Alcohol use: No    Drug use: No    Sexual activity: Yes     Partners: Male     Social Drivers of Health     Financial Resource Strain: Low Risk  (9/6/2024)    Overall Financial Resource Strain (CARDIA)     Difficulty of Paying Living Expenses: Not very hard   Food Insecurity: No Food Insecurity (9/6/2024)    Hunger Vital Sign     Worried About Running Out of Food in the Last Year: Never true     Ran Out of Food in the Last Year: Never true   Transportation Needs: No Transportation Needs (7/11/2024)    TRANSPORTATION NEEDS     Transportation : No   Physical Activity: Unknown (9/6/2024)    Exercise Vital Sign      "Minutes of Exercise per Session: 20 min   Stress: Stress Concern Present (9/6/2024)    [employer] - Occupational Stress Questionnaire     Feeling of Stress : To some extent   Housing Stability: Low Risk  (9/6/2024)    Housing Stability Vital Sign     Unable to Pay for Housing in the Last Year: No     Homeless in the Last Year: No       Patient Employment       Status   Disabled    Employer   [employer] (OTHER)    Address   ,                Exam:     Vitals:    12/05/24 1208   BP: 123/83   BP Location: Left arm   Patient Position: Sitting   Pulse: 108   Height: 5' 7" (1.702 m)          In general, the patient is well nourished and appears to be in no acute distress.    MENTAL STATUS: language is fluent, normal verbal comprehension, short-term and remote memory is intact, attention is normal, patient is alert and oriented x 3, fund of knowlege is appropriate by vocabulary. Behavior and judgment appropriate.     CRANIAL NERVE EXAM:  Nystagmus in bilateral horizontal gaze with associated subjective blurry vision. Extraocular muscles are intact. No facial asymmetry. Facial sensation is intact bilaterally. There is no dysarthria. Uvula is midline, and palate moves symmetrically. Shoulder shrug intact bilaterlly. Tongue protrusion is L leaning. Hearing is intact to finger rub bilaterally. Neck is supple with full ROM    MOTOR EXAM: Normal bulk and tone throughout UE and LE bilaterally.   No pronator drift; rapid sequential movements are normal; Strength is  5/5 in all groups in the lower extremities and upper extremities    SENSORY EXAM: Normal to light touch, vibration throughout.    COORDINATION: Normal finger-to-nose exam. Normal heel-to-shin exam.    GAIT: Slowed unsteady gait, unable to perform tandem.     Positive Romberg's        Imaging (personally reviewed):     No results found for this or any previous visit.    No results found for this or any previous visit.    No results found for this " "or any previous visit.    Results for orders placed during the hospital encounter of 02/12/24    MRI Brain Demyelinating W W/O Contrast    Impression  1. No acute findings or significant change since 07/27/2023  2. Several nonenhancing white matter lesions, appearance suggesting possible combination of demyelination and old lacunar infarcts  3. 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  Cervical cord syrinx is not as well delineated as on prior exams      Electronically signed by: Farida Almaguer MD  Date:    02/12/2024  Time:    17:11    Results for orders placed during the hospital encounter of 04/22/24    MRI Cervical Spine Demyelinating W W/O Contrast    Impression  1. There is a cystic area 6 x 2 mm posterior to C3 and a area of increased T2 signal in the cervical cord from C 3 through C5 with no enhancement suggesting probable syrinx (syringomyelia)  2. C5-6 focal right paracentral disc extrusion or bony proliferation compressing the cord on the right with mild spinal stenosis moderate right lateral recess and mild neural foramina narrowing  3. At C6-7 focal central protrusion extrusion centrally effacing the cord contributing to mild spinal stenosis  4. Chiari 1 malformation      Electronically signed by: Kaylan Wilhelm MD  Date:    04/22/2024  Time:    11:40    Results for orders placed during the hospital encounter of 04/22/24    MRI Thoracic Spine Demyelinating W W/O Contrast    Impression  1. On the  film there is abnormal linear signal throughout the cervical spine refer to MRI of the cervical spine  2. Disc desiccation throughout and small right paracentral protrusion or bulge at T7-8 and no compression of the cord or spinal stenosis      Electronically signed by: Kaylan Wilhelm MD  Date:    04/22/2024  Time:    11:22      Labs:     Lab Results   Component Value Date    TMRTZEUL92LD 8 (L) 12/05/2024     No results found for: "JCVINDEX", "JCVANTIBODY"  No results found for: "AW6OPLBY", " ""ABSOLUTECD3", "EW0BAPWK", "ABSOLUTECD8", "DB8GNIVB", "ABSOLUTECD4", "LABCD48"  Lab Results   Component Value Date    WBC 6.56 07/14/2024    RBC 4.27 07/14/2024    HGB 11.2 (L) 07/14/2024    HCT 37.4 07/14/2024    MCV 88 07/14/2024    MCH 26.2 (L) 07/14/2024    MCHC 29.9 (L) 07/14/2024    RDW 13.6 07/14/2024     07/14/2024    MPV 9.6 07/14/2024    GRAN 3.5 07/14/2024    GRAN 53.7 07/14/2024    LYMPH 2.4 07/14/2024    LYMPH 36.3 07/14/2024    MONO 0.5 07/14/2024    MONO 7.3 07/14/2024    EOS 0.1 07/14/2024    BASO 0.03 07/14/2024    EOSINOPHIL 1.7 07/14/2024    BASOPHIL 0.5 07/14/2024     Sodium   Date Value Ref Range Status   07/14/2024 140 136 - 145 mmol/L Final     Potassium   Date Value Ref Range Status   07/14/2024 3.7 3.5 - 5.1 mmol/L Final     Chloride   Date Value Ref Range Status   07/14/2024 103 95 - 110 mmol/L Final     CO2   Date Value Ref Range Status   07/14/2024 28 23 - 29 mmol/L Final     Glucose   Date Value Ref Range Status   07/14/2024 99 70 - 110 mg/dL Final     BUN   Date Value Ref Range Status   07/14/2024 11 6 - 20 mg/dL Final     Creatinine   Date Value Ref Range Status   07/14/2024 0.8 0.5 - 1.4 mg/dL Final     Calcium   Date Value Ref Range Status   07/14/2024 9.1 8.7 - 10.5 mg/dL Final     Total Protein   Date Value Ref Range Status   07/14/2024 6.7 6.0 - 8.4 g/dL Final     Albumin   Date Value Ref Range Status   07/14/2024 2.6 (L) 3.5 - 5.2 g/dL Final     Total Bilirubin   Date Value Ref Range Status   07/14/2024 0.2 0.1 - 1.0 mg/dL Final     Comment:     For infants and newborns, interpretation of results should be based  on gestational age, weight and in agreement with clinical  observations.    Premature Infant recommended reference ranges:  Up to 24 hours.............<8.0 mg/dL  Up to 48 hours............<12.0 mg/dL  3-5 days..................<15.0 mg/dL  6-29 days.................<15.0 mg/dL       Alkaline Phosphatase   Date Value Ref Range Status   07/14/2024 56 55 - 135 U/L " Final     AST   Date Value Ref Range Status   07/14/2024 12 10 - 40 U/L Final     ALT   Date Value Ref Range Status   07/14/2024 8 (L) 10 - 44 U/L Final     Anion Gap   Date Value Ref Range Status   07/14/2024 9 8 - 16 mmol/L Final     eGFR if    Date Value Ref Range Status   07/11/2022 >60 >60 mL/min/1.73 m^2 Final     eGFR if non    Date Value Ref Range Status   07/11/2022 >60 >60 mL/min/1.73 m^2 Final     Comment:     Calculation used to obtain the estimated glomerular filtration  rate (eGFR) is the CKD-EPI equation.          Diagnosis/Assessment/Plan:     RIS  -Assessment: S/p chiari I malformation decompression, no gross enlargement of her syrinx.  She does have worsening nystagmus and imbalance without any sensory changes, possible central etiology  -Imaging:  Updated MRI now  -Disease Modifying Therapies:  Na  Symptom management   -Vestibular therapy for balance training    -Zofran PRN for nausea   -Lab workup for imbalance  Plan discussed and questions were answered to satisfaction.  Return to clinic in 4 weeks.                 Total time spent with the patient: 30 minutes, including face to face consultation, chart review and coordination of care, on the day of the visit. This includes face to face time and non-face to face time preparing to see the patient (eg, review of tests), obtaining and/or reviewing separately obtained history, documenting clinical information in the electronic or other health record, independently interpreting results and communicating results to the patient/family/caregiver, or care coordination.   I performed a neurobehavioral status examination that included a clinical assessment of thinking, reasoning, and judgment. Please see above HPI and ROS for full details.       Lisa Sutherland MD, MSc  Attending neurologist

## 2024-12-05 NOTE — TELEPHONE ENCOUNTER
----- Message from Saige Dahl sent at 12/3/2024  4:56 PM CST -----  Regarding: FW: Returning a Missed Call  Contact: Elva Burroughs    ----- Message -----  From: Faiza Maxwell  Sent: 12/3/2024   4:43 PM CST  To: [facility] Staff  Subject: Returning a Missed Call                          Returning a Missed Call     Caller:Elva Burroughs         Returning call to: Noe     Caller can be reached @:[phone]     Nature of the call:Patient is retuning call to schedule . Requesting a call back

## 2024-12-05 NOTE — LETTER
December 5, 2024      [facility] [address]  [address]  [facility] [address]  Phone: [phone]       Patient: Elva Burroughs   YOB: 1970  Date of Visit: 12/05/2024    To Whom It May Concern:    Elva Burroughs  was at [facility] on 12/05/2024. The patient may return to work on 12/6/24. f you have any questions or concerns, or if I can be of further assistance, please do not hesitate to contact me.    Sincerely,        Charlene Tiwari MA

## 2024-12-05 NOTE — LETTER
December 5, 2024      [facility] [address]  [address]  [facility] [address]  Phone: [phone]       Patient: Elva Burroughs   YOB: 1970  Date of Visit: 12/05/2024    To Whom It May Concern:    Elva Burroughs  was at [facility] on 12/05/2024. The patient may return to work on 12/5/24. If you have any questions or concerns, or if I can be of further assistance, please do not hesitate to contact me.    Sincerely,        Charlene Tiwari MA

## 2024-12-06 LAB
ALBUMIN SERPL ELPH-MCNC: 3.87 G/DL (ref 3.35–5.55)
ALPHA1 GLOB SERPL ELPH-MCNC: 0.3 G/DL (ref 0.17–0.41)
ALPHA2 GLOB SERPL ELPH-MCNC: 0.87 G/DL (ref 0.43–0.99)
B-GLOBULIN SERPL ELPH-MCNC: 1.05 G/DL (ref 0.5–1.1)
GAMMA GLOB SERPL ELPH-MCNC: 1.72 G/DL (ref 0.67–1.58)
PROT SERPL-MCNC: 7.8 G/DL (ref 6–8.4)
VIT B12 SERPL-MCNC: 216 NG/L (ref 180–914)

## 2024-12-06 RX ORDER — ERGOCALCIFEROL 1.25 MG/1
50000 CAPSULE ORAL
Qty: 90 CAPSULE | Refills: 3 | Status: SHIPPED | OUTPATIENT
Start: 2024-12-06

## 2024-12-09 LAB
NEUROFILAMENT LIGHT CHAIN, PLASMA: 16.4 PG/ML
PATHOLOGIST INTERPRETATION SPE: NORMAL

## 2024-12-10 ENCOUNTER — TELEPHONE (OUTPATIENT)
Dept: NEUROLOGY | Facility: CLINIC | Age: 54
End: 2024-12-10
Payer: COMMERCIAL

## 2024-12-10 LAB
COPPER SERPL-MCNC: 1189 UG/L (ref 810–1990)
METHYLMALONATE SERPL-SCNC: 0.25 NMOL/ML
PYRIDOXAL SERPL-MCNC: 4 UG/L (ref 5–50)

## 2024-12-10 NOTE — TELEPHONE ENCOUNTER
----- Message from Derek sent at 12/6/2024  3:44 PM CST -----  Regarding: Medication Clarification  Contact: Elan/[facility] [phone]  Elan called from [facility] regarding pt ergocalciferol (ERGOCALCIFEROL) 50,000 unit Cap medication that needs clarification. Please call pharmacy as soon as possible.     [facility] [address] [place], [place]  [address] [place] [address]  Phone: [phone] Fax: [phone]

## 2024-12-11 LAB — A-TOCOPHEROL VIT E SERPL-MCNC: 867 UG/DL (ref 500–1800)

## 2024-12-14 LAB — GFAP, PLASMA: 123 PG/ML (ref 0–87.1)

## 2024-12-17 ENCOUNTER — PATIENT MESSAGE (OUTPATIENT)
Dept: NEUROLOGY | Facility: CLINIC | Age: 54
End: 2024-12-17
Payer: COMMERCIAL

## 2024-12-19 ENCOUNTER — OFFICE VISIT (OUTPATIENT)
Dept: NEUROLOGY | Facility: CLINIC | Age: 54
End: 2024-12-19
Payer: COMMERCIAL

## 2024-12-19 DIAGNOSIS — G93.5 CHIARI MALFORMATION TYPE I: Primary | Chronic | ICD-10-CM

## 2024-12-19 DIAGNOSIS — R93.0 RADIOLOGICALLY ISOLATED SYNDROME: ICD-10-CM

## 2024-12-19 NOTE — PROGRESS NOTES
[facility]  Follow Up Patient Visit Virtual    The patient location is: home  Visit type: Virtual visit with synchronous audio and video    Each patient to whom he or she provides medical services by telemedicine is:  (1) informed of the relationship between the physician and patient and the respective role of any other health care provider with respect to management of the patient; and (2) notified that he or she may decline to receive medical services by telemedicine and may withdraw from such care at any time.      Disease Summary     Principle neurological diagnosis: RIS, Chiari-malformation  Date of symptom onset: NA  Date of diagnosis: 5/9/2024  Disease type at diagnosis: RIS  Disease type currently: RIS  Previous therapy: NA  Current therapy: NA  Last MRI Brain: 12/5/24 - stable with lesions indicative of demyelination   Last MRI C-spine: 11/25/24 - no lesions, but syrinx present   Last MRI T-spine: 4/22/2024 - no lesions.  CSF: NA  JCV status: NA  Other relevant labs and tests: 4/4/2024: NMO and MOG - negative, NfL - 16.5  Vit D:   Lab Results   Component Value Date    [id number] 8 (L) 12/05/2024       Interval history:     The nausea has gotten better, imbalance is persistent since post suboccipital craniectomy for decompression of Chiari malformation on 6/13/24.     She has been experiencing blurry vision and headaches - she has not been able to correct with glasses.    Lab results reviewed.     She has started taking [id number] vit D.     ROS:     SOCIAL HISTORY  Living arrangements - the patient lives with their family.  Social History     Socioeconomic History    Marital status:    Tobacco Use    Smoking status: Never     Passive exposure: Never    Smokeless tobacco: Never   Substance and Sexual Activity    Alcohol use: No    Drug use: No    Sexual activity: Yes     Partners: Male     Social Drivers of Health     Financial Resource Strain: Low Risk  (9/6/2024)     Overall Financial Resource Strain (CARDIA)     Difficulty of Paying Living Expenses: Not very hard   Food Insecurity: No Food Insecurity (9/6/2024)    Hunger Vital Sign     Worried About Running Out of Food in the Last Year: Never true     Ran Out of Food in the Last Year: Never true   Transportation Needs: No Transportation Needs (7/11/2024)    TRANSPORTATION NEEDS     Transportation : No   Physical Activity: Unknown (9/6/2024)    Exercise Vital Sign     Minutes of Exercise per Session: 20 min   Stress: Stress Concern Present (9/6/2024)    [employer] [place] [employer] - Occupational Stress Questionnaire     Feeling of Stress : To some extent   Housing Stability: Low Risk  (9/6/2024)    Housing Stability Vital Sign     Unable to Pay for Housing in the Last Year: No     Homeless in the Last Year: No       Patient Employment       Status   Full Time    Employer   [employer] (OTHER)    Address   ,              Exam:     Vitals unable to be obtained virtually         In general, the patient is well nourished and appears to be in no acute distress.          MENTAL STATUS: language is fluent, normal verbal comprehension, attention is normal, fund of knowlege is appropriate by vocabulary.     CRANIAL NERVE EXAM:  There is no internuclear ophthalmoplegia.  Extraocular muscles are intact. No facial asymmetry.There is no dysarthria. Shoulder shrug intact bilaterlly. Neck full ROM    MOTOR EXAM: Able to move all extremities spontaneously antigravity    COORDINATION: No gross dysmetria    GAIT: Narrow based and stable.       Imaging (personally reviewed):     Results for orders placed during the hospital encounter of 12/18/24    MRI Brain Demyelinating W W/O Contrast    Impression  1. No acute findings or imaging change since prior MRI of 02/12/2024  2. White matter lesions compatible with history of multiple sclerosis, stable  3. Chiari 1 malformation with prior occipital craniectomy, as  "before      Electronically signed by: Farida Almaguer MD  Date:    12/18/2024  Time:    13:12    Results for orders placed during the hospital encounter of 04/22/24    MRI Cervical Spine Demyelinating W W/O Contrast    Impression  1. There is a cystic area 6 x 2 mm posterior to C3 and a area of increased T2 signal in the cervical cord from C 3 through C5 with no enhancement suggesting probable syrinx (syringomyelia)  2. C5-6 focal right paracentral disc extrusion or bony proliferation compressing the cord on the right with mild spinal stenosis moderate right lateral recess and mild neural foramina narrowing  3. At C6-7 focal central protrusion extrusion centrally effacing the cord contributing to mild spinal stenosis  4. Chiari 1 malformation      Electronically signed by: Kaylan Wilhelm MD  Date:    04/22/2024  Time:    11:40    Results for orders placed during the hospital encounter of 04/22/24    MRI Thoracic Spine Demyelinating W W/O Contrast    Impression  1. On the  film there is abnormal linear signal throughout the cervical spine refer to MRI of the cervical spine  2. Disc desiccation throughout and small right paracentral protrusion or bulge at T7-8 and no compression of the cord or spinal stenosis      Electronically signed by: Kaylan Wilhelm MD  Date:    04/22/2024  Time:    11:22      Labs:     Lab Results   Component Value Date    [id number] 8 (L) 12/05/2024     No results found for: "JCVINDEX", "JCVANTIBODY"  No results found for: "MT5BRLRK", "ABSOLUTECD3", "FH5BWTRH", "ABSOLUTECD8", "ON6BQFLW", "ABSOLUTECD4", "LABCD48"  Lab Results   Component Value Date    WBC 6.56 07/14/2024    RBC 4.27 07/14/2024    HGB 11.2 (L) 07/14/2024    HCT 37.4 07/14/2024    MCV 88 07/14/2024    MCH 26.2 (L) 07/14/2024    MCHC 29.9 (L) 07/14/2024    RDW 13.6 07/14/2024     07/14/2024    MPV 9.6 07/14/2024    GRAN 3.5 07/14/2024    GRAN 53.7 07/14/2024    LYMPH 2.4 07/14/2024    LYMPH 36.3 07/14/2024    MONO 0.5 " 07/14/2024    MONO 7.3 07/14/2024    EOS 0.1 07/14/2024    BASO 0.03 07/14/2024    EOSINOPHIL 1.7 07/14/2024    BASOPHIL 0.5 07/14/2024     Sodium   Date Value Ref Range Status   07/14/2024 140 136 - 145 mmol/L Final     Potassium   Date Value Ref Range Status   07/14/2024 3.7 3.5 - 5.1 mmol/L Final     Chloride   Date Value Ref Range Status   07/14/2024 103 95 - 110 mmol/L Final     CO2   Date Value Ref Range Status   07/14/2024 28 23 - 29 mmol/L Final     Glucose   Date Value Ref Range Status   07/14/2024 99 70 - 110 mg/dL Final     BUN   Date Value Ref Range Status   07/14/2024 11 6 - 20 mg/dL Final     Creatinine   Date Value Ref Range Status   07/14/2024 0.8 0.5 - 1.4 mg/dL Final     Calcium   Date Value Ref Range Status   07/14/2024 9.1 8.7 - 10.5 mg/dL Final     Total Protein   Date Value Ref Range Status   07/14/2024 6.7 6.0 - 8.4 g/dL Final     Albumin   Date Value Ref Range Status   07/14/2024 2.6 (L) 3.5 - 5.2 g/dL Final     Total Bilirubin   Date Value Ref Range Status   07/14/2024 0.2 0.1 - 1.0 mg/dL Final     Comment:     For infants and newborns, interpretation of results should be based  on gestational age, weight and in agreement with clinical  observations.    Premature Infant recommended reference ranges:  Up to 24 hours.............<8.0 mg/dL  Up to 48 hours............<12.0 mg/dL  3-5 days..................<15.0 mg/dL  6-29 days.................<15.0 mg/dL       Alkaline Phosphatase   Date Value Ref Range Status   07/14/2024 56 55 - 135 U/L Final     AST   Date Value Ref Range Status   07/14/2024 12 10 - 40 U/L Final     ALT   Date Value Ref Range Status   07/14/2024 8 (L) 10 - 44 U/L Final     Anion Gap   Date Value Ref Range Status   07/14/2024 9 8 - 16 mmol/L Final     eGFR if    Date Value Ref Range Status   07/11/2022 >60 >60 mL/min/1.73 m^2 Final     eGFR if non    Date Value Ref Range Status   07/11/2022 >60 >60 mL/min/1.73 m^2 Final     Comment:      Calculation used to obtain the estimated glomerular filtration  rate (eGFR) is the CKD-EPI equation.                   Diagnosis/Assessment/Plan:     RIS  -Assessment: Asymptomatic from RIS standpoint. She remains at risk for MS but currently does not have symptoms yet for qualify for CIS. Her symptoms of HA and imbalance are secondary to her Chiari I malformation for which she underwent decompression. Recent imaging demonstrating no gross enlargement of her syrinx.    -Imaging:  Updated MRI annually for monitoring of any RIS to MS transition  -Disease Modifying Therapies:  Discussed that should she want to start preventative DMT, teriflunomide or GA may be reasonable options in terms of safety/risk.   Symptom management             -B12 and B6 supplement    Plan discussed and questions were answered to satisfaction.  Return to clinic in 2 months.        NEURO MULTIPLE SCLEROSIS IMPRESSION:   Number of relapses in the past year?:  0  Clinical Progression:  Clinically Stable  MRI Progression:  Stable  MS Classification:  Radiologically isolated syndrome  Current DMT: none  Symptom Management:  No change in symptom management        Total time spent with the patient: 30 minutes, including face to face consultation, chart review and coordination of care, on the day of the visit. This includes face to face time and non-face to face time preparing to see the patient (eg, review of tests), obtaining and/or reviewing separately obtained history, documenting clinical information in the electronic or other health record, independently interpreting resultsand communicating results to the patient/family/caregiver, or care coordination.           Lisa Sutherland MD, MSc  Attending neurologist

## 2024-12-19 NOTE — PATIENT INSTRUCTIONS
Take vitamin B complex (B12 and B6)      Teriflunomide (once a day oral pill)  https://www.accessdata.fda.gov/drugsatfda_docs/label/2016/286900m381iqp.pdf    Glatiramer acetate (three times a week subcutaneous injection)  https://www.accessdata.fda.gov/drugsatfda_docs/label/2009/546479c563byp.pdf

## 2024-12-30 PROBLEM — H55.00 NYSTAGMUS: Status: ACTIVE | Noted: 2024-12-30

## 2025-01-10 ENCOUNTER — TELEPHONE (OUTPATIENT)
Dept: VASCULAR SURGERY | Facility: CLINIC | Age: 55
End: 2025-01-10
Payer: COMMERCIAL

## 2025-02-10 ENCOUNTER — TELEPHONE (OUTPATIENT)
Dept: PSYCHIATRY | Facility: CLINIC | Age: 55
End: 2025-02-10
Payer: COMMERCIAL

## 2025-02-10 ENCOUNTER — TELEPHONE (OUTPATIENT)
Dept: VASCULAR SURGERY | Facility: CLINIC | Age: 55
End: 2025-02-10
Payer: COMMERCIAL

## 2025-02-10 NOTE — TELEPHONE ENCOUNTER
Office received paperwork from [employer] [place]. Called Pt to discuss, spoke with  and received Pt's new number. Updated file. Called Pt and LVM.

## 2025-02-13 NOTE — TELEPHONE ENCOUNTER
Spoke to Pt on phone and reviewed paperwork questions. Pt is looking to retire due to disability through [employer] [place]. Will review with RHIANNA Sutherland MD.

## 2025-02-18 NOTE — TELEPHONE ENCOUNTER
SW reviewed Pt's disability long term paperwork request with provider. While Dr. Sutherland supports the request, she does not treat the disabling symptoms causing the Pt difficulty with work. Provider and SW agreed to consult with Pt's neurosurgeon RHIANNA León DO for completion of paperwork. Staff message sent.     SW attempted to contact Pt regarding follow up, LVM.

## 2025-02-20 NOTE — TELEPHONE ENCOUNTER
ODETTE attempted to contact Pt regarding follow up about disability paperwork. Dr. León stated that the paperwork should be completed by Dr. Maria Dolores Sadler since the Pt's impairing symptoms are most related to that specialty. Dr. León stated he would contact Dr. Sadler to get the patient scheduled soon.

## 2025-02-25 PROBLEM — G35 MULTIPLE SCLEROSIS: Status: ACTIVE | Noted: 2025-02-25

## 2025-02-27 ENCOUNTER — TELEPHONE (OUTPATIENT)
Dept: OPHTHALMOLOGY | Facility: CLINIC | Age: 55
End: 2025-02-27
Payer: COMMERCIAL

## 2025-02-27 NOTE — TELEPHONE ENCOUNTER
SW attempted to follow up with Pt again re: disability paperwork and plan for Dr. Sadler to complete paperwork. LVM. Also sent portal message.

## 2025-02-27 NOTE — TELEPHONE ENCOUNTER
----- Message from Maria Dolores Sadler MD sent at 2/25/2025  9:21 AM CST -----  Regarding: RE: Disability paperwok  Yes, can do. Let's get her into optom for vision check and papilledema rule out and us soon with testing, [place]  ----- Message -----  From: Juan Ramon León DO  Sent: 2/21/2025   9:53 AM CST  To: Maria Dolores Sadler MD  Subject: FW: Disability paperwok                          I did a chiari decompression for this lady which helped her headache symptoms.  More recently, she complaining of vision loss that she states is making it difficult for her to work (she's applying for disability).  I'm not sure what is going on with her but do I not think its related to chiari.  Could you get her in soon?  I suppose IIH could be in play.  ----- Message -----  From: Chica Burns LMSW  Sent: 2/18/2025   3:58 PM CST  To: Juan Ramon León DO; Lisa Sutherland MD  Subject: RE: Disability paperwok                          Per my conversation with her, she is seeking disability [employer] due to persistent imbalance, visual disturbances, and upper extremity weakness. 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 She has to sit often. All of this has been interfering with her performance at work it seems. Her visual disturbances are her most impairing symptom it appears. Her pcp met with her in December and noted similar issues, so if you do not feel comfortable supporting the paperwork I can consult with him. Chica  ----- Message -----  From: Juan Ramon León DO  Sent: 2/18/2025   3:45 PM CST  To: Lisa Sutherland MD; Chica Burns LMSW  Subject: RE: Disability paperwok                          Disability paperwork usually goes through disability office but what is the reason that she needs to apply for disability?  At our last visit she was doing well and was back at work.  -----  Message -----  From: Chica Burns LMSW  Sent: 2/18/2025   2:31 PM CST  To: Juan Ramon León DO; Lisa Sutherland MD  Subject: Disability paperwok                              Hi Dr. León,I'm one of the social workers in the MS clinic working with Dr. Sutherland. Our mutual patient, Ms. Burroughs, is applying for disability [employer] and has contacted our office with paperwork. While Dr. Sutherland supports her retiring, the disabling symptoms Ms. Burroughs experiences are not treated by our clinic. Do you support her discontinuation of working due to her symptoms, and if so who in your office typically helps with this type of paperwork? If your office does not have a  or support staff member to help, I'm happy to assist and then send to you for your signature. I know sending the paperwork to the disability desk is also an option. Please let us know your thoughts. Kindly,Chica uBrns LCSW, MPHOchsB[facility]

## 2025-02-27 NOTE — TELEPHONE ENCOUNTER
Left message stating I was calling to schedule appt.     *Let's get her into optom for vision check and papilledema rule out and us soon with testing

## 2025-03-07 ENCOUNTER — PATIENT MESSAGE (OUTPATIENT)
Dept: PSYCHIATRY | Facility: CLINIC | Age: 55
End: 2025-03-07
Payer: COMMERCIAL

## 2025-03-11 NOTE — TELEPHONE ENCOUNTER
SW contacted [facility] office to discuss Pt's situation with multiple providers supporting disability. Explained that while RHIANNA Sutherland MD supports Pt's application for disability, she does not feel comfortable completing the paperwork as she does not treat the impairing/disabling symptoms.  was understanding, and stated that office visit notes could be faxed in place of paperwork. [facility] office will anticipate completed paperwork to come from Dr. Sadler's office after 3/31 appointment.    Message sent to Pt.

## 2025-03-13 NOTE — TELEPHONE ENCOUNTER
ODETTE faxed office visit notes from last year with MS clinic providers to [facility] attn: Carine Bright (ph: [phone]), Pt's .

## 2025-03-27 NOTE — PROGRESS NOTES
"Date:  3/31/2025    ?  Referring Provider:   Juan Ramon León, DO    Copies of Letters to the Following:   Juan Ramon León, DO    Chief Complaint:  I saw Elva Burroughs at the [facility] for neuro-ophthalmic evaluation.   She is a 54 y.o. 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    History:     HPI    Referred: Dr. León     53 y/o female present to Neuro-ophthalmic clinic for Chiari malformation   evaluation. History of MS. She reports blurry vision after stroke. She   only wears OTC readers to help with reading. She feels "imbalance" often.   She is light sensitive. No nystagmus per patient. She still has headaches   but are subsiding. No diplopia, no ocular pain, no floaters, no flashes of   lights, or color vision defects. No ocular s/x or procedures reported.     Eyemeds    Last edited by Lea Epps on 3/31/2025  2:06 PM.          11/27/2024 Keen  "Patient is now 5m status post Chiari decompression for large Chiari malformation.  She has recovered very well and continues to have significant improvement in her Chiari type headaches.       Her main complaints are blurry vision and gait imbalance.  I do not have clear explanation for vision changes and would like her to evaluated by ophtho.  Her cervical syrinx is decreased in size but her imbalance could be related to this residual or due to demyelinating condition.     - Referral to Dr Sadler (ophtho)  - F/u with neurology as scheduled  - Ordered updated Brain MRI   "  ?  Current Medications[1]  Review of patient's allergies indicates:   Allergen Reactions    Pcn [penicillins] Other (See Comments)     unknown     Past Medical History:   Diagnosis Date    Anemia     Anticoagulated on Coumadin     Deep vein thrombosis     DVT (deep venous thrombosis)     GERD (gastroesophageal reflux disease)     " Headache     Hypertension     Pulmonary embolism      Past Surgical History:   Procedure Laterality Date    DECOMPRESSION OF CHIARI MALFORMATION BY REMOVAL OF POSTERIOR ARCH OF FIRST CERVICAL VERTEBRA Bilateral 6/13/2024    Procedure: DECOMPRESSION, CHIARI MALFORMATION, BY 1ST CERVICAL VERTEBRA POSTERIOR ARCH REMOVAL;  Surgeon: Juan Ramon León DO;  Location: [facility] OR [address] [place] [address];  Service: Neurosurgery;  Laterality: Bilateral;  (Chiari decompression with autologous duraplasty)  Dolan and josé manuel mars    PULMONARY EMBOLISM SURGERY      TUBAL LIGATION       Family History   Problem Relation Name Age of Onset    Arthritis Mother      Cancer Mother      Depression Mother      Early death Mother      Heart disease Mother      Hypertension Mother      Mental illness Mother      Miscarriages / Stillbirths Mother       Social History[2]    Examination:  She was well-appearing. She was alert and oriented. Attention span and concentration were normal. Speech, language, memory, and general knowledge were intact.      Her distance visual acuity without correction was 20/25  in the right eye and 20/20  in the left eye. Her near visual acuity with correction was J7 PH J5 in the right eye and J5 PH NI in the left eye.      She perceived 8/8 OD and 8/8 OS Ishihara color plates correctly. Pupils were brisk to light without an afferent defect. Ocular ductions were full. 6EP in primary by cross cover. There were intermittent square wave jerks in primary gaze. There was right beating jerk nystagmus in right gaze, left beating jerk nystagmus in left gaze, up beating jerk nystagmus in up gaze. Lids were symmetric.     Optic discs appeared normal without swelling or pallor. Pupillary dilation was not necessary for visualization of the optic disc today.     Laboratories Reviewed:      Latest Reference Range & Units 06/22/22 07:39 04/04/24 13:33 12/05/24 12:18   KAPIL NEGATIVE  POSITIVE !     KAPIL Titer 2 titer 1:80 (H)     KAPIL Pattern 2   CYTOPLASMIC     Anti-SSB Antibody 0.00 - 0.99 Ratio  0.06    Anti-SSB Interpretation Negative   Negative    Anti Sm Antibody 0.00 - 0.99 Ratio  0.09    Anti-Sm Interpretation Negative   Negative    KAPIL Pattern  NUCLEAR, SPECKLED     KAPIL Titer titer 1:40 (H)     CNS Demyelinating Disease Eval   SEE BELOW    Cytoplasmic Neutrophilic Ab <1:20 Titer  <1:20    MOG-IgG1 Negative   Negative    Perinuclear (P-ANCA) <1:20 Titer  <1:20    Complement (C-3) 50 - 180 mg/dL  149    Complement (C-4) 11 - 44 mg/dL  46 (H)    Protein, Serum 6.0 - 8.4 g/dL   7.8   Albumin grams/dl 3.35 - 5.55 g/dL   3.87   Alpha-1 grams/dl 0.17 - 0.41 g/dL   0.30   Alpha-2 0.43 - 0.99 g/dL   0.87   Beta 0.50 - 1.10 g/dL   1.05   Beta-2 Globulin 0.2 - 0.5 g/dL 0.4     Gamma 0.67 - 1.58 g/dL   1.72 (H)   Pathologist Interpretation SPE    REVIEWED   Albumin, Electrophoresis 3.8 - 4.8 g/dL 3.3 (L)     Alpha 1-Globulin, Electrophoresis 0.2 - 0.3 g/dL 0.3     Alpha 2-Globulin, Electrophoresis 0.5 - 0.9 g/dL 0.8     Beta-Globulin, Electrophoresis 0.4 - 0.6 g/dL 0.5     Gamma-Globulin, Electrophoresis 0.8 - 1.7 g/dL 1.6     SPE Interp.  SEE NOTE     !: Data is abnormal  (H): Data is abnormally high  (L): Data is abnormally low    I personally reviewed the above labs.  ?  Neuroimaging Reviewed:   12/2024 MRI brain demyelinating protocol  Numerous nonenhancing periventricular/pericallosal predominant white matter lesions unchanged in number, size and configuration.  Remote appearing right caudate head infarct.     No new or enlarging lesions.  No pathologic contrast enhancement.  No evidence of an acute infarction, acute hemorrhage, hydrocephalus or increased mass effect.  Unchanged appearance of Chiari 1 malformation with cervical cord syrinx and previous suboccipital craniectomy.     Impression:     1. No acute findings or imaging change since prior MRI of 02/12/2024  2. White matter lesions compatible with history of multiple sclerosis, stable  3. Chiari 1  malformation with prior occipital craniectomy, as before     Ocular Imaging, Photos, Records Reviewed:     OCT RNFL Today 3/31/2025:   Right Eye - Average RNFL 95 all segments normal   Left Eye - Average RNFL 81 borderline temporal and inferior thinning       Visual Field Test 24-2 OU Today 3/31/2025: Right Eye - fixation losses 10/10, false positives 0%, false negatives 0%, MD 0.57dB, Impression OD: full. Left Eye - fixation losses 3/11, false positives 0%, false negatives 6%, MD -1.18dB, Impression OS: few very mild scattered points.  ?  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 They report sudden onset of blurred vision worsened by moving around and eye movements which occurred prior to decompression surgery around the time of her episodes of altered consciousness. Neuro-ophthalmologic examination was notable for good visual acuities, full color vision, square wave jerks, direction changing nystagmus and small angle esotropia (asymptomatic) OCT with normal peripapillary RNFL thickness OD, some very mild temporal and inferior thinning OS, no evidence of disc edema. Formal visual fields were without any concerning pattern of visual field changes.    The etiology of her blurred vision is likely nystagmus. Differential is broad for her including Chiari malformation, ischemic insults, versus demyelination. Given her description of high acuity in onset, more likely ischemic or demyelinating. Will trial baclofen for nystagmus and monitor for any improvement in subjective clarity.    They also mentioned concern for continued oozing of decompression incision.   ?  Plan:  1. Baclofen 5 mg TID  2. Follow up in neuroimmunology and neurosurgery clinics as planned  3. Follow up with optometry/ophthalmology for yearly routine eye exams and refraction  needs    Follow-up:  I will see her in follow-up in 4-5 months or sooner with any change.  ?no testing  ?  Visit Checklist (as applicable):  1. Status of new and prior symptoms discussed? yes  2. Neuroimaging reviewed/ ordered as appropriate? yes  3. Ocular imaging and photos reviewed/ ordered as appropriate? yes  4. Plan for work-up and treatment discussed with patient? yes  5. Potential medication side-effects and monitoring plan discussed? yes  6. Review of outside medical records was performed and pertinent details are summarized in the HPI above? N/a    Time spent on this encounter: 60 minutes. This includes face to face time and non-face to face time preparing to see the patient (eg, review of tests), obtaining and/or reviewing separately obtained history, documenting clinical information in the electronic or other health record, independently interpreting results and communicating results to the patient/family/caregiver, or care coordinator.      DOTTIE Miguel  Neuro-Ophthalmology Consultant         [1]   Current Outpatient Medications   Medication Sig Dispense Refill    acetaminophen (TYLENOL) 500 MG tablet Take 1,000 mg by mouth daily as needed for Pain.      ascorbic acid, vitamin C, (VITAMIN C) 500 MG tablet Take 500 mg by mouth once daily.      b complex vitamins capsule Take 1 capsule by mouth once daily.      ELIQUIS 5 mg Tab TAKE 2 TABLETS BY MOUTH TWICE DAILY FOR 7 DAYS THEN 1 TABLET TWICE DAILY.      ergocalciferol (ERGOCALCIFEROL) 50,000 unit Cap Take 1 capsule (50,000 Units total) by mouth every 7 days. 90 capsule 3    famotidine (PEPCID) 40 MG tablet Take 1 tablet (40 mg total) by mouth once daily. 30 tablet 5    ferrous sulfate (IRON) 325 mg (65 mg iron) Tab tablet Take 325 mg by mouth once daily.      metoprolol succinate (TOPROL-XL) 25 MG 24 hr tablet Take 1 tablet (25 mg total) by mouth once daily. 90 tablet 3    NIFEdipine (PROCARDIA-XL) 60 MG (OSM) 24 hr tablet Take 1 tablet (60 mg  total) by mouth once daily. 90 tablet 3    ondansetron (ZOFRAN-ODT) 4 MG TbDL Take 1 tablet (4 mg total) by mouth every 8 (eight) hours as needed (nausea). 30 tablet 2    venlafaxine (EFFEXOR-XR) 37.5 MG 24 hr capsule Take 1 capsule (37.5 mg total) by mouth once daily. 30 capsule 2    baclofen (LIORESAL) 5 mg Tab tablet Take 1 tablet (5 mg total) by mouth 3 (three) times daily. 90 tablet 6     Current Facility-Administered Medications   Medication Dose Route Frequency Provider Last Rate Last Admin    thyrotropin anthony 0.9 mg  0.9 mg Intramuscular Once        [2]   Social History  Socioeconomic History    Marital status:    Tobacco Use    Smoking status: Never     Passive exposure: Never    Smokeless tobacco: Never   Substance and Sexual Activity    Alcohol use: No    Drug use: No    Sexual activity: Yes     Partners: Male     Social Drivers of Health     Financial Resource Strain: Low Risk  (9/6/2024)    Overall Financial Resource Strain (CARDIA)     Difficulty of Paying Living Expenses: Not very hard   Food Insecurity: No Food Insecurity (9/6/2024)    Hunger Vital Sign     Worried About Running Out of Food in the Last Year: Never true     Ran Out of Food in the Last Year: Never true   Transportation Needs: No Transportation Needs (7/11/2024)    TRANSPORTATION NEEDS     Transportation : No   Physical Activity: Unknown (9/6/2024)    Exercise Vital Sign     Minutes of Exercise per Session: 20 min   Stress: Stress Concern Present (9/6/2024)    Citizen of [place] [employer] Occupational [employer] - Occupational Stress Questionnaire     Feeling of Stress : To some extent   Housing Stability: Unknown (9/6/2024)    Housing Stability Vital Sign     Unable to Pay for Housing in the Last Year: No     Homeless in the Last Year: No

## 2025-03-31 ENCOUNTER — OFFICE VISIT (OUTPATIENT)
Dept: OPHTHALMOLOGY | Facility: CLINIC | Age: 55
End: 2025-03-31
Payer: COMMERCIAL

## 2025-03-31 ENCOUNTER — CLINICAL SUPPORT (OUTPATIENT)
Dept: OPHTHALMOLOGY | Facility: CLINIC | Age: 55
End: 2025-03-31
Payer: COMMERCIAL

## 2025-03-31 DIAGNOSIS — H55.09 ACQUIRED JERK NYSTAGMUS: Primary | ICD-10-CM

## 2025-03-31 DIAGNOSIS — G95.0 SYRINX OF SPINAL CORD: ICD-10-CM

## 2025-03-31 DIAGNOSIS — H53.15 VISUAL DISTORTIONS OF SHAPE AND SIZE: ICD-10-CM

## 2025-03-31 DIAGNOSIS — G93.5 CHIARI MALFORMATION TYPE I: Chronic | ICD-10-CM

## 2025-03-31 PROCEDURE — 1159F MED LIST DOCD IN RCRD: CPT | Mod: CPTII,S$GLB,, | Performed by: STUDENT IN AN ORGANIZED HEALTH CARE EDUCATION/TRAINING PROGRAM

## 2025-03-31 PROCEDURE — 92083 EXTENDED VISUAL FIELD XM: CPT | Mod: S$GLB,,, | Performed by: STUDENT IN AN ORGANIZED HEALTH CARE EDUCATION/TRAINING PROGRAM

## 2025-03-31 PROCEDURE — 99205 OFFICE O/P NEW HI 60 MIN: CPT | Mod: S$GLB,,, | Performed by: STUDENT IN AN ORGANIZED HEALTH CARE EDUCATION/TRAINING PROGRAM

## 2025-03-31 PROCEDURE — 99999 PR PBB SHADOW E&M-EST. PATIENT-LVL III: CPT | Mod: PBBFAC,,, | Performed by: STUDENT IN AN ORGANIZED HEALTH CARE EDUCATION/TRAINING PROGRAM

## 2025-03-31 PROCEDURE — 92133 CPTRZD OPH DX IMG PST SGM ON: CPT | Mod: S$GLB,,, | Performed by: STUDENT IN AN ORGANIZED HEALTH CARE EDUCATION/TRAINING PROGRAM

## 2025-03-31 RX ORDER — BACLOFEN 5 MG/1
5 TABLET ORAL 3 TIMES DAILY
Qty: 90 TABLET | Refills: 6 | Status: SHIPPED | OUTPATIENT
Start: 2025-03-31

## 2025-03-31 NOTE — LETTER
[facility] - [address]  [address]  [place] [address]  Phone: [phone]  Fax: [phone]   March 31, 2025    Juan Ramon León,   [address]  [facility] [address]    Patient: Elva Burroughs   MR Number: [id number]   YOB: 1970   Date of Visit: 3/31/2025       Dear Dr. León :    Thank you for referring Elva Burroughs to me for evaluation. 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 They report sudden onset of blurred vision worsened by moving around and eye movements which occurred prior to decompression surgery around the time of her episodes of altered consciousness. Neuro-ophthalmologic examination was notable for good visual acuities, full color vision, square wave jerks, direction changing nystagmus and small angle esotropia (asymptomatic) OCT with normal peripapillary RNFL thickness OD, some very mild temporal and inferior thinning OS, no evidence of disc edema. Formal visual fields were without any concerning pattern of visual field changes.    The etiology of her blurred vision is likely nystagmus. Differential is broad for her including Chiari malformation, ischemic insults, versus demyelination. Given her description of high acuity in onset, more likely ischemic or demyelinating. Will trial baclofen for nystagmus and monitor for any improvement in subjective clarity.    They also mentioned concern for continued oozing of decompression incision.      Plan:  1. Baclofen 5 mg TID  2. Follow up in neuroimmunology and neurosurgery clinics as planned  3. Follow up with optometry/ophthalmology for yearly routine eye exams and refraction needs    Follow-up:  I will see her in follow-up in 4-5 months or sooner with any change.    If you  have questions, please do not hesitate to call me. I look forward to following Ms. Harisnimisharoxie GEMA Burroughs along with you.    Sincerely,        Maria Dolores Sadler MD       CC  No Recipients

## 2025-03-31 NOTE — PROGRESS NOTES
oct done ou     Patient had uncontrollable eye movement od more than os     24-2  sf done ou     Rel & Fix =   poor od                        Fair os      Coop=       fair     Patient has no allergies to latex or adhesives at this time  Patient had a lot of fixation loss with od uncontrollable eye movement     Jthomas    No mrx  Used age correction lens for test

## 2025-04-01 ENCOUNTER — TELEPHONE (OUTPATIENT)
Dept: OPHTHALMOLOGY | Facility: CLINIC | Age: 55
End: 2025-04-01
Payer: COMMERCIAL

## 2025-04-01 NOTE — TELEPHONE ENCOUNTER
Left message stating work excuse is only for date of appt and not able to extend work date excuse.

## 2025-04-01 NOTE — TELEPHONE ENCOUNTER
----- Message from Sydnie sent at 4/1/2025  9:39 AM CDT -----  Regarding: Patient advice  Contact: Pt  [phone]  Name of Caller:  Elva   Contact Preference:   [phone] Nature of Call: Requesting  a doctor's excuse for today still having some issues please send it to pt's portal  ----- Message -----  From: Sydnie Harris  Sent: 4/1/2025   9:42 AM CDT  To: Doroteo PARADA Staff  Subject: Patient advice                                   Name of Caller:  Elva   Contact Preference:   [phone] Nature of Call: Requesting  a doctor's excuse for today still having some issues

## 2025-04-24 ENCOUNTER — TELEPHONE (OUTPATIENT)
Dept: OPHTHALMOLOGY | Facility: CLINIC | Age: 55
End: 2025-04-24
Payer: COMMERCIAL

## 2025-04-24 NOTE — TELEPHONE ENCOUNTER
Left message stating I was returning phone call.     **She can stop the baclofen and we can try a different one. Memantine 10 mg BID. I can send to whichever pharmacy she prefers.

## 2025-04-24 NOTE — TELEPHONE ENCOUNTER
----- Message from Maria Dolores Sadler MD sent at 4/24/2025 11:54 AM CDT -----  Regarding: RE: Rx Issues  She can stop the baclofen and we can try a different one. Memantine 10 mg BID. I can send to whichever pharmacy she prefers.   ----- Message -----  From: Lea Epps  Sent: 4/24/2025  11:44 AM CDT  To: Maria Dolores Sadler MD  Subject: FW: Rx Issues                                    Pt states even take 1 table makes her sleepy.  ----- Message -----  From: Maria Dolores Sadler MD  Sent: 4/24/2025  10:40 AM CDT  To: Lea Epsp  Subject: RE: Rx Issues                                    Yes could try twice a day instead of three times  ----- Message -----  From: Lea Epps  Sent: 4/24/2025  10:37 AM CDT  To: Maria Dolores Sadler MD  Subject: FW: Rx Issues                                    Decrease dosages?  ----- Message -----  From: Susana Jules  Sent: 4/24/2025  10:36 AM CDT  To: Doroteo Hines  Subject: Rx Issues                                        .Type:  Rx Issues Name of Caller:Elva BurroughsPharmacy Name:[facility] [address] [place] [id number]: [phone] Fax: [phone] Name:baclofen (LIORESAL) 5 mg Tab tabletWhat do they need to clarify?:Patient called about rx making her sleep all day. Patient thinks rx could be to strong.Best Call Back Number:[phone]

## 2025-05-13 ENCOUNTER — PATIENT MESSAGE (OUTPATIENT)
Dept: PSYCHIATRY | Facility: CLINIC | Age: 55
End: 2025-05-13
Payer: COMMERCIAL

## 2025-05-29 PROBLEM — E87.6 HYPOKALEMIA: Status: ACTIVE | Noted: 2025-05-29

## 2025-06-19 ENCOUNTER — PATIENT MESSAGE (OUTPATIENT)
Dept: PSYCHIATRY | Facility: CLINIC | Age: 55
End: 2025-06-19
Payer: COMMERCIAL

## 2025-07-30 ENCOUNTER — PATIENT MESSAGE (OUTPATIENT)
Dept: PSYCHIATRY | Facility: CLINIC | Age: 55
End: 2025-07-30
Payer: COMMERCIAL

## 2025-08-01 ENCOUNTER — PATIENT MESSAGE (OUTPATIENT)
Dept: PSYCHIATRY | Facility: CLINIC | Age: 55
End: 2025-08-01
Payer: COMMERCIAL

## 2025-08-05 ENCOUNTER — OFFICE VISIT (OUTPATIENT)
Dept: OPHTHALMOLOGY | Facility: CLINIC | Age: 55
End: 2025-08-05
Payer: COMMERCIAL

## 2025-08-05 ENCOUNTER — TELEPHONE (OUTPATIENT)
Dept: NEUROLOGY | Facility: CLINIC | Age: 55
End: 2025-08-05
Payer: COMMERCIAL

## 2025-08-05 ENCOUNTER — DOCUMENTATION ONLY (OUTPATIENT)
Dept: OPHTHALMOLOGY | Facility: CLINIC | Age: 55
End: 2025-08-05
Payer: COMMERCIAL

## 2025-08-05 DIAGNOSIS — H55.00 NYSTAGMUS: Primary | ICD-10-CM

## 2025-08-05 PROCEDURE — 3044F HG A1C LEVEL LT 7.0%: CPT | Mod: CPTII,S$GLB,, | Performed by: STUDENT IN AN ORGANIZED HEALTH CARE EDUCATION/TRAINING PROGRAM

## 2025-08-05 PROCEDURE — 99215 OFFICE O/P EST HI 40 MIN: CPT | Mod: S$GLB,,, | Performed by: STUDENT IN AN ORGANIZED HEALTH CARE EDUCATION/TRAINING PROGRAM

## 2025-08-05 PROCEDURE — 99999 PR PBB SHADOW E&M-EST. PATIENT-LVL II: CPT | Mod: PBBFAC,,, | Performed by: STUDENT IN AN ORGANIZED HEALTH CARE EDUCATION/TRAINING PROGRAM

## 2025-08-05 PROCEDURE — 1160F RVW MEDS BY RX/DR IN RCRD: CPT | Mod: CPTII,S$GLB,, | Performed by: STUDENT IN AN ORGANIZED HEALTH CARE EDUCATION/TRAINING PROGRAM

## 2025-08-05 PROCEDURE — 1159F MED LIST DOCD IN RCRD: CPT | Mod: CPTII,S$GLB,, | Performed by: STUDENT IN AN ORGANIZED HEALTH CARE EDUCATION/TRAINING PROGRAM

## 2025-08-05 RX ORDER — MEMANTINE HYDROCHLORIDE 5 MG/1
5 TABLET ORAL 2 TIMES DAILY
Qty: 60 TABLET | Refills: 7 | Status: SHIPPED | OUTPATIENT
Start: 2025-08-05 | End: 2026-08-05

## 2025-08-05 NOTE — PROGRESS NOTES
"Date:  8/5/2025    ?  Referring Provider:   No ref. provider found    Copies of Letters to the Following:   No ref. provider found    Chief Complaint:  I saw Elva Burroughs at the [facility] for neuro-ophthalmic evaluation.   She is a 54 y.o. 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    History:     HPI    Referred: Dr. León     55 y/o female present to Neuro-ophthalmic clinic for Chiari malformation   evaluation. History of MS. She reports blurry vision after stroke. She   only wears OTC readers to help with reading. She feels "imbalance" often.   She is light sensitive. No nystagmus per patient. She still has headaches   but are subsiding. No diplopia, no ocular pain, no floaters, no flashes of   lights, or color vision defects. No ocular s/x or procedures reported.     Eyemeds    Last edited by Lea Epps on 3/31/2025  2:06 PM.          11/27/2024 Keen  "Patient is now 5m status post Chiari decompression for large Chiari malformation.  She has recovered very well and continues to have significant improvement in her Chiari type headaches.       Her main complaints are blurry vision and gait imbalance.  I do not have clear explanation for vision changes and would like her to evaluated by ophtho.  Her cervical syrinx is decreased in size but her imbalance could be related to this residual or due to demyelinating condition.     - Referral to Dr Sadler ([facility])  - F/u with neurology as scheduled  - Ordered updated Brain MRI   "  ?  Interval History: 8/5/2025    HPI    DSL- 3/31/2025 Dr. Sadler    55 y/o female present to clinic for concerns of worsening nystagmus. She   reports that nystagmus episodes are more frequent since last visit. She   tried Baclofen 5 mg TID with no improvement and with side effects of   sleepiness. She notices blurred, however denies " diplopia. No headaches, no   migraines, no ocular pain, no floaters, or flashes of lights reported.     Eyemeds  No gtts  Last edited by Lea Epps on 8/5/2025 12:09 PM.          Current Medications[1]  Review of patient's allergies indicates:   Allergen Reactions    Pcn [penicillins] Other (See Comments)     unknown     Past Medical History:   Diagnosis Date    Anemia     Anticoagulated on Coumadin     Deep vein thrombosis     DVT (deep venous thrombosis)     GERD (gastroesophageal reflux disease)     Headache     Hypertension     Pulmonary embolism      Past Surgical History:   Procedure Laterality Date    DECOMPRESSION OF CHIARI MALFORMATION BY REMOVAL OF POSTERIOR ARCH OF FIRST CERVICAL VERTEBRA Bilateral 6/13/2024    Procedure: DECOMPRESSION, CHIARI MALFORMATION, BY 1ST CERVICAL VERTEBRA POSTERIOR ARCH REMOVAL;  Surgeon: Juan Ramon León DO;  Location: [facility] OR [address] [place] [address];  Service: Neurosurgery;  Laterality: Bilateral;  (Chiari decompression with autologous duraplasty)  Axel mars    PULMONARY EMBOLISM SURGERY      TUBAL LIGATION       Family History   Problem Relation Name Age of Onset    Arthritis Mother      Breast cancer Mother      Early death Mother      Heart disease Mother      Hypertension Mother      Mental illness Mother      Miscarriages / Stillbirths Mother      Breast cancer Maternal Grandmother       Social History[2]    Examination:  She was well-appearing. She was alert and oriented. Attention span and concentration were normal. Speech, language, memory, and general knowledge were intact.      Her distance visual acuity without correction was 20/25  in the right eye and 20/25  in the left eye. Her near visual acuity with correction was J5 PH J3 in the right eye and J3 PH NI in the left eye.      She perceived 8/8 OD and 8/8 OS Ishihara color plates correctly. Pupils were brisk to light without an afferent defect. Ocular ductions were full. Ortho in primary by cross cover.  There were intermittent square wave jerks in primary gaze. There was high amplitude low frequency right beating jerk nystagmus in right gaze, left beating jerk nystagmus in left gaze, up beating jerk nystagmus in up gaze. Lids were symmetric.     Optic discs appeared normal without swelling or pallor. Pupillary dilation was not necessary for visualization of the optic disc today.     Laboratories Reviewed:      Latest Reference Range & Units 06/22/22 07:39 04/04/24 13:33 12/05/24 12:18   KAPIL NEGATIVE  POSITIVE !     KAPIL Titer 2 titer 1:80 (H)     KAPIL Pattern 2  CYTOPLASMIC     Anti-SSB Antibody 0.00 - 0.99 Ratio  0.06    Anti-SSB Interpretation Negative   Negative    Anti Sm Antibody 0.00 - 0.99 Ratio  0.09    Anti-Sm Interpretation Negative   Negative    KAPIL Pattern  NUCLEAR, SPECKLED     KAPIL Titer titer 1:40 (H)     CNS Demyelinating Disease Eval   SEE BELOW    Cytoplasmic Neutrophilic Ab <1:20 Titer  <1:20    MOG-IgG1 Negative   Negative    Perinuclear (P-ANCA) <1:20 Titer  <1:20    Complement (C-3) 50 - 180 mg/dL  149    Complement (C-4) 11 - 44 mg/dL  46 (H)    Protein, Serum 6.0 - 8.4 g/dL   7.8   Albumin grams/dl 3.35 - 5.55 g/dL   3.87   Alpha-1 grams/dl 0.17 - 0.41 g/dL   0.30   Alpha-2 0.43 - 0.99 g/dL   0.87   Beta 0.50 - 1.10 g/dL   1.05   Beta-2 Globulin 0.2 - 0.5 g/dL 0.4     Gamma 0.67 - 1.58 g/dL   1.72 (H)   Pathologist Interpretation SPE    REVIEWED   Albumin, Electrophoresis 3.8 - 4.8 g/dL 3.3 (L)     Alpha 1-Globulin, Electrophoresis 0.2 - 0.3 g/dL 0.3     Alpha 2-Globulin, Electrophoresis 0.5 - 0.9 g/dL 0.8     Beta-Globulin, Electrophoresis 0.4 - 0.6 g/dL 0.5     Gamma-Globulin, Electrophoresis 0.8 - 1.7 g/dL 1.6     SPE Interp.  SEE NOTE     !: Data is abnormal  (H): Data is abnormally high  (L): Data is abnormally low    I personally reviewed the above labs.  ?  Neuroimaging Reviewed:   12/2024 MRI brain demyelinating protocol  Numerous nonenhancing periventricular/pericallosal predominant  white matter lesions unchanged in number, size and configuration.  Remote appearing right caudate head infarct.     No new or enlarging lesions.  No pathologic contrast enhancement.  No evidence of an acute infarction, acute hemorrhage, hydrocephalus or increased mass effect.  Unchanged appearance of Chiari 1 malformation with cervical cord syrinx and previous suboccipital craniectomy.     Impression:     1. No acute findings or imaging change since prior MRI of 02/12/2024  2. White matter lesions compatible with history of multiple sclerosis, stable  3. Chiari 1 malformation with prior occipital craniectomy, as before     Ocular Imaging, Photos, Records Reviewed:     OCT RNFL  3/31/2025:   Right Eye - Average RNFL 95 all segments normal   Left Eye - Average RNFL 81 borderline temporal and inferior thinning       Visual Field Test 24-2 OU 3/31/2025: Right Eye - fixation losses 10/10, false positives 0%, false negatives 0%, MD 0.57dB, Impression OD: full. Left Eye - fixation losses 3/11, false positives 0%, false negatives 6%, MD -1.18dB, Impression OS: few very mild scattered points.  ?  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 They report sudden onset of blurred vision worsened by moving around and eye movements which occurred prior to decompression surgery around the time of her episodes of altered consciousness. Neuro-ophthalmologic examination was notable for good visual acuities, full color vision, square wave jerks, direction changing nystagmus and small angle esotropia (asymptomatic) OCT with normal peripapillary RNFL thickness OD, some very mild temporal and inferior thinning OS, no evidence of disc edema. Formal visual fields were without any concerning pattern of visual field changes.    The etiology of her blurred vision is likely nystagmus.  Differential is broad for her including Chiari malformation, ischemic insults, versus demyelination. Given her description of high acuity in onset, more likely ischemic or demyelinating. Will trial baclofen for nystagmus and monitor for any improvement in subjective clarity.    They also mentioned concern for continued oozing of decompression incision.     8/5/2025: She did not tolerate the baclofen well due to drowsiness. Will start memantine 5 mg BID today. Advised that if she tolerates it well, can uptitrate to max of 10 mg BID. She will let me know how she responds. She denies oscillopsia, just reports blurry vision in all directions.   ?  Plan:  1. Start memantine 5 mg BID, can uptitrate as needed to max of 10 mg BID  2. Follow up in neuroimmunology and neurosurgery clinics as planned, overdue for follow up in neuroimmunology clinic  3. Follow up with optometry/ophthalmology for yearly routine eye exams and refraction needs    Follow-up:  I will see her in follow-up in 6 months or sooner with any change.  OCT and HVF  ?  Visit Checklist (as applicable):  1. Status of new and prior symptoms discussed? yes  2. Neuroimaging reviewed/ ordered as appropriate? yes  3. Ocular imaging and photos reviewed/ ordered as appropriate? yes  4. Plan for work-up and treatment discussed with patient? yes  5. Potential medication side-effects and monitoring plan discussed? yes  6. Review of outside medical records was performed and pertinent details are summarized in the HPI above? N/a    Time spent on this encounter: 45 minutes. This includes face to face time and non-face to face time preparing to see the patient (eg, review of tests), obtaining and/or reviewing separately obtained history, documenting clinical information in the electronic or other health record, independently interpreting results and communicating results to the patient/family/caregiver, or care coordinator.      DOTTIE Miguel  Neuro-Ophthalmology  Consultant         [1]   Current Outpatient Medications   Medication Sig Dispense Refill    acetaminophen (TYLENOL) 500 MG tablet Take 1,000 mg by mouth daily as needed for Pain.      ascorbic acid, vitamin C, (VITAMIN C) 500 MG tablet Take 500 mg by mouth once daily.      b complex vitamins capsule Take 1 capsule by mouth once daily.      ELIQUIS 5 mg Tab Take 1 tablet (5 mg total) by mouth 2 (two) times daily. 60 tablet 5    ergocalciferol (ERGOCALCIFEROL) 50,000 unit Cap Take 1 capsule (50,000 Units total) by mouth every 7 days. 90 capsule 3    famotidine (PEPCID) 40 MG tablet Take 1 tablet (40 mg total) by mouth once daily. 30 tablet 5    ferrous sulfate (IRON) 325 mg (65 mg iron) Tab tablet Take 325 mg by mouth once daily.      memantine (NAMENDA) 5 MG Tab Take 1 tablet (5 mg total) by mouth 2 (two) times daily. 60 tablet 7    metoprolol succinate (TOPROL-XL) 25 MG 24 hr tablet Take 1 tablet (25 mg total) by mouth once daily. 90 tablet 3    NIFEdipine (PROCARDIA-XL) 60 MG (OSM) 24 hr tablet Take 1 tablet (60 mg total) by mouth once daily. 90 tablet 3    ondansetron (ZOFRAN-ODT) 4 MG TbDL Take 1 tablet (4 mg total) by mouth every 8 (eight) hours as needed (nausea). 30 tablet 2    potassium chloride SA (K-DUR,KLOR-CON) 20 MEQ tablet Take 1 tablet (20 mEq total) by mouth once daily. 30 tablet 5    venlafaxine (EFFEXOR-XR) 37.5 MG 24 hr capsule Take 1 capsule (37.5 mg total) by mouth once daily. (Patient not taking: Reported on 5/29/2025) 30 capsule 2     Current Facility-Administered Medications   Medication Dose Route Frequency Provider Last Rate Last Admin    thyrotropin anthony 0.9 mg  0.9 mg Intramuscular Once        [2]   Social History  Socioeconomic History    Marital status:    Tobacco Use    Smoking status: Never     Passive exposure: Never    Smokeless tobacco: Never   Substance and Sexual Activity    Alcohol use: No    Drug use: No    Sexual activity: Yes     Partners: Male     Social Drivers of  Health     Financial Resource Strain: Low Risk  (9/6/2024)    Overall Financial Resource Strain (CARDIA)     Difficulty of Paying Living Expenses: Not very hard   Food Insecurity: No Food Insecurity (9/6/2024)    Hunger Vital Sign     Worried About Running Out of Food in the Last Year: Never true     Ran Out of Food in the Last Year: Never true   Transportation Needs: No Transportation Needs (7/11/2024)    TRANSPORTATION NEEDS     Transportation : No   Physical Activity: Unknown (9/6/2024)    Exercise Vital Sign     Minutes of Exercise per Session: 20 min   Stress: Stress Concern Present (9/6/2024)    [employer] - Occupational Stress Questionnaire     Feeling of Stress : To some extent   Housing Stability: Unknown (9/6/2024)    Housing Stability Vital Sign     Unable to Pay for Housing in the Last Year: No
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
## 2025-08-18 ENCOUNTER — PATIENT MESSAGE (OUTPATIENT)
Dept: PSYCHIATRY | Facility: CLINIC | Age: 55
End: 2025-08-18
Payer: COMMERCIAL

## 2025-08-29 ENCOUNTER — TELEPHONE (OUTPATIENT)
Dept: HEMATOLOGY/ONCOLOGY | Facility: CLINIC | Age: 55
End: 2025-08-29
Payer: COMMERCIAL

## (undated) DEVICE — SEALANT ADHERUS AUTOSPRY DURAL

## (undated) DEVICE — DRAPE HALF SURGICAL 40X58IN

## (undated) DEVICE — ELECTRODE REM PLYHSV RETURN 9

## (undated) DEVICE — SUT 4/0 18IN NUROLON BLK B

## (undated) DEVICE — CLIP MED TICALL

## (undated) DEVICE — MARKER SKIN RULER STERILE

## (undated) DEVICE — SUT VICRYL PLUS 2-0 CT1 18

## (undated) DEVICE — SUT VICRYL PLUS 3-0 SH 18IN

## (undated) DEVICE — TRAY NEURO OMC

## (undated) DEVICE — SPONGE PATTY SURGICAL .5X3IN

## (undated) DEVICE — BUR BONE CUT MICRO TPS 3X3.8MM

## (undated) DEVICE — ROUTER TAPERED 2.3MM

## (undated) DEVICE — ADHESIVE DERMABOND ADVANCED

## (undated) DEVICE — STRIP MEDI WND CLSR 1/2X4IN

## (undated) DEVICE — Device

## (undated) DEVICE — CATH SUCTION 10FR

## (undated) DEVICE — DURAPREP SURG SCRUB 26ML

## (undated) DEVICE — BURR FLUTED RND 5MM

## (undated) DEVICE — SUT D SPECIAL VICRYL 2-0

## (undated) DEVICE — DRESSING SURGICAL 1X3

## (undated) DEVICE — DRAPE C-ARMOR EQUIPMENT COVER

## (undated) DEVICE — SUT PROLENE 4-0 BL MONO TF

## (undated) DEVICE — DRAPE STERI-DRAPE 1000 17X11IN

## (undated) DEVICE — CORD BIPOLAR 12 FOOT

## (undated) DEVICE — KIT SURGIFLO HEMOSTATIC MATRIX

## (undated) DEVICE — DRAPE STERI INSTRUMENT 1018

## (undated) DEVICE — SUT VICRYL PLUS 0 CT1 18IN

## (undated) DEVICE — DRESSING ABSRBNT ISLAND 3.6X8

## (undated) DEVICE — DRAPE INCISE IOBAN 2 23X17IN

## (undated) DEVICE — TRAY CATH 1-LYR URIMTR 16FR

## (undated) DEVICE — TUBE FRAZIER 5MM 2FT SOFT TIP